# Patient Record
Sex: FEMALE | Race: WHITE | Employment: OTHER | URBAN - METROPOLITAN AREA
[De-identification: names, ages, dates, MRNs, and addresses within clinical notes are randomized per-mention and may not be internally consistent; named-entity substitution may affect disease eponyms.]

---

## 2019-10-07 ENCOUNTER — HOSPITAL ENCOUNTER (EMERGENCY)
Age: 84
Discharge: HOME | End: 2019-10-07
Attending: EMERGENCY MEDICINE
Payer: MEDICARE

## 2019-10-07 ENCOUNTER — APPOINTMENT (OUTPATIENT)
Dept: GENERAL RADIOLOGY | Age: 84
End: 2019-10-07
Payer: MEDICARE

## 2019-10-07 VITALS
OXYGEN SATURATION: 96 % | DIASTOLIC BLOOD PRESSURE: 80 MMHG | SYSTOLIC BLOOD PRESSURE: 150 MMHG | HEIGHT: 63 IN | TEMPERATURE: 97.2 F | HEART RATE: 89 BPM | RESPIRATION RATE: 14 BRPM | WEIGHT: 149.25 LBS | BODY MASS INDEX: 26.45 KG/M2

## 2019-10-07 DIAGNOSIS — S93.492A SPRAIN OF ANTERIOR TALOFIBULAR LIGAMENT OF LEFT ANKLE, INITIAL ENCOUNTER: Primary | ICD-10-CM

## 2019-10-07 PROCEDURE — 73610 X-RAY EXAM OF ANKLE: CPT

## 2019-10-07 PROCEDURE — 99283 EMERGENCY DEPT VISIT LOW MDM: CPT

## 2019-10-07 ASSESSMENT — PAIN DESCRIPTION - PAIN TYPE: TYPE: ACUTE PAIN

## 2019-10-07 ASSESSMENT — PAIN DESCRIPTION - FREQUENCY: FREQUENCY: INTERMITTENT

## 2019-10-07 ASSESSMENT — PAIN DESCRIPTION - LOCATION: LOCATION: ANKLE

## 2019-10-07 ASSESSMENT — PAIN SCALES - GENERAL
PAINLEVEL_OUTOF10: 1
PAINLEVEL_OUTOF10: 1
PAINLEVEL_OUTOF10: 2

## 2019-10-07 ASSESSMENT — PAIN DESCRIPTION - ORIENTATION: ORIENTATION: LEFT

## 2019-10-07 ASSESSMENT — PAIN - FUNCTIONAL ASSESSMENT: PAIN_FUNCTIONAL_ASSESSMENT: PREVENTS OR INTERFERES SOME ACTIVE ACTIVITIES AND ADLS

## 2020-04-17 ENCOUNTER — APPOINTMENT (OUTPATIENT)
Dept: CT IMAGING | Age: 85
DRG: 066 | End: 2020-04-17
Payer: MEDICARE

## 2020-04-17 ENCOUNTER — APPOINTMENT (OUTPATIENT)
Dept: GENERAL RADIOLOGY | Age: 85
DRG: 066 | End: 2020-04-17
Payer: MEDICARE

## 2020-04-17 ENCOUNTER — HOSPITAL ENCOUNTER (INPATIENT)
Age: 85
LOS: 3 days | Discharge: ANOTHER HOSPITAL | DRG: 066 | End: 2020-04-20
Attending: EMERGENCY MEDICINE | Admitting: INTERNAL MEDICINE
Payer: MEDICARE

## 2020-04-17 PROBLEM — I63.9 ACUTE THROMBOTIC STROKE (HCC): Status: ACTIVE | Noted: 2020-04-17

## 2020-04-17 LAB
A/G RATIO: 1.5 (ref 1.1–2.2)
ALBUMIN SERPL-MCNC: 5 G/DL (ref 3.4–5)
ALP BLD-CCNC: 112 U/L (ref 40–129)
ALT SERPL-CCNC: 12 U/L (ref 10–40)
ANION GAP SERPL CALCULATED.3IONS-SCNC: 14 MMOL/L (ref 3–16)
APTT: 29.2 SEC (ref 24.2–36.2)
AST SERPL-CCNC: 21 U/L (ref 15–37)
BASOPHILS ABSOLUTE: 0.1 K/UL (ref 0–0.2)
BASOPHILS RELATIVE PERCENT: 0.8 %
BILIRUB SERPL-MCNC: 1.2 MG/DL (ref 0–1)
BUN BLDV-MCNC: 15 MG/DL (ref 7–20)
CALCIUM SERPL-MCNC: 10.5 MG/DL (ref 8.3–10.6)
CHLORIDE BLD-SCNC: 102 MMOL/L (ref 99–110)
CO2: 27 MMOL/L (ref 21–32)
CREAT SERPL-MCNC: 0.6 MG/DL (ref 0.6–1.2)
EOSINOPHILS ABSOLUTE: 0.1 K/UL (ref 0–0.6)
EOSINOPHILS RELATIVE PERCENT: 1.1 %
GFR AFRICAN AMERICAN: >60
GFR NON-AFRICAN AMERICAN: >60
GLOBULIN: 3.3 G/DL
GLUCOSE BLD-MCNC: 124 MG/DL (ref 70–99)
GLUCOSE BLD-MCNC: 127 MG/DL (ref 70–99)
HCT VFR BLD CALC: 46.7 % (ref 36–48)
HEMOGLOBIN: 15.6 G/DL (ref 12–16)
INR BLD: 0.95 (ref 0.86–1.14)
LYMPHOCYTES ABSOLUTE: 1.6 K/UL (ref 1–5.1)
LYMPHOCYTES RELATIVE PERCENT: 24.4 %
MCH RBC QN AUTO: 29 PG (ref 26–34)
MCHC RBC AUTO-ENTMCNC: 33.4 G/DL (ref 31–36)
MCV RBC AUTO: 86.8 FL (ref 80–100)
MONOCYTES ABSOLUTE: 0.3 K/UL (ref 0–1.3)
MONOCYTES RELATIVE PERCENT: 4.7 %
NEUTROPHILS ABSOLUTE: 4.6 K/UL (ref 1.7–7.7)
NEUTROPHILS RELATIVE PERCENT: 69 %
PDW BLD-RTO: 13.9 % (ref 12.4–15.4)
PERFORMED ON: ABNORMAL
PLATELET # BLD: 296 K/UL (ref 135–450)
PMV BLD AUTO: 8.6 FL (ref 5–10.5)
POTASSIUM SERPL-SCNC: 4.2 MMOL/L (ref 3.5–5.1)
PRO-BNP: 359 PG/ML (ref 0–449)
PROTHROMBIN TIME: 11 SEC (ref 10–13.2)
RBC # BLD: 5.38 M/UL (ref 4–5.2)
SODIUM BLD-SCNC: 143 MMOL/L (ref 136–145)
TOTAL PROTEIN: 8.3 G/DL (ref 6.4–8.2)
TROPONIN: <0.01 NG/ML
WBC # BLD: 6.6 K/UL (ref 4–11)

## 2020-04-17 PROCEDURE — 80053 COMPREHEN METABOLIC PANEL: CPT

## 2020-04-17 PROCEDURE — 85730 THROMBOPLASTIN TIME PARTIAL: CPT

## 2020-04-17 PROCEDURE — 85025 COMPLETE CBC W/AUTO DIFF WBC: CPT

## 2020-04-17 PROCEDURE — 70450 CT HEAD/BRAIN W/O DYE: CPT

## 2020-04-17 PROCEDURE — 6360000002 HC RX W HCPCS: Performed by: INTERNAL MEDICINE

## 2020-04-17 PROCEDURE — 6370000000 HC RX 637 (ALT 250 FOR IP): Performed by: PHYSICIAN ASSISTANT

## 2020-04-17 PROCEDURE — 99291 CRITICAL CARE FIRST HOUR: CPT

## 2020-04-17 PROCEDURE — 84484 ASSAY OF TROPONIN QUANT: CPT

## 2020-04-17 PROCEDURE — 6360000004 HC RX CONTRAST MEDICATION: Performed by: PHYSICIAN ASSISTANT

## 2020-04-17 PROCEDURE — 6370000000 HC RX 637 (ALT 250 FOR IP): Performed by: INTERNAL MEDICINE

## 2020-04-17 PROCEDURE — 93005 ELECTROCARDIOGRAM TRACING: CPT | Performed by: PHYSICIAN ASSISTANT

## 2020-04-17 PROCEDURE — 71045 X-RAY EXAM CHEST 1 VIEW: CPT

## 2020-04-17 PROCEDURE — 83880 ASSAY OF NATRIURETIC PEPTIDE: CPT

## 2020-04-17 PROCEDURE — 85610 PROTHROMBIN TIME: CPT

## 2020-04-17 PROCEDURE — 70496 CT ANGIOGRAPHY HEAD: CPT

## 2020-04-17 PROCEDURE — 2060000000 HC ICU INTERMEDIATE R&B

## 2020-04-17 RX ORDER — FAMOTIDINE 20 MG/1
20 TABLET, FILM COATED ORAL 2 TIMES DAILY
Status: DISCONTINUED | OUTPATIENT
Start: 2020-04-17 | End: 2020-04-20 | Stop reason: HOSPADM

## 2020-04-17 RX ORDER — AMLODIPINE BESYLATE 10 MG/1
10 TABLET ORAL DAILY
Status: ON HOLD | COMMUNITY
End: 2020-05-19 | Stop reason: HOSPADM

## 2020-04-17 RX ORDER — ACETAMINOPHEN 500 MG
500 TABLET ORAL EVERY 4 HOURS PRN
Status: ON HOLD | COMMUNITY
End: 2020-05-19 | Stop reason: HOSPADM

## 2020-04-17 RX ORDER — LOSARTAN POTASSIUM 100 MG/1
100 TABLET ORAL DAILY
Status: DISCONTINUED | OUTPATIENT
Start: 2020-04-18 | End: 2020-04-20 | Stop reason: HOSPADM

## 2020-04-17 RX ORDER — ASPIRIN 81 MG/1
81 TABLET, CHEWABLE ORAL DAILY
Status: DISCONTINUED | OUTPATIENT
Start: 2020-04-18 | End: 2020-04-17

## 2020-04-17 RX ORDER — MELOXICAM 7.5 MG/1
7.5 TABLET ORAL DAILY
Status: DISCONTINUED | OUTPATIENT
Start: 2020-04-17 | End: 2020-04-17

## 2020-04-17 RX ORDER — CALCIUM CARBONATE 500(1250)
500 TABLET ORAL DAILY
Status: DISCONTINUED | OUTPATIENT
Start: 2020-04-18 | End: 2020-04-20 | Stop reason: HOSPADM

## 2020-04-17 RX ORDER — BIOTIN 1 MG
1000 TABLET ORAL
COMMUNITY

## 2020-04-17 RX ORDER — ASPIRIN 81 MG/1
324 TABLET, CHEWABLE ORAL ONCE
Status: COMPLETED | OUTPATIENT
Start: 2020-04-17 | End: 2020-04-17

## 2020-04-17 RX ORDER — ASPIRIN 81 MG/1
324 TABLET, CHEWABLE ORAL DAILY
Status: DISCONTINUED | OUTPATIENT
Start: 2020-04-18 | End: 2020-04-18

## 2020-04-17 RX ORDER — AMLODIPINE BESYLATE 5 MG/1
10 TABLET ORAL DAILY
Status: DISCONTINUED | OUTPATIENT
Start: 2020-04-18 | End: 2020-04-20 | Stop reason: HOSPADM

## 2020-04-17 RX ORDER — ASPIRIN 81 MG/1
81 TABLET, CHEWABLE ORAL DAILY
Status: ON HOLD | COMMUNITY
End: 2020-05-19 | Stop reason: HOSPADM

## 2020-04-17 RX ORDER — ATORVASTATIN CALCIUM 40 MG/1
40 TABLET, FILM COATED ORAL NIGHTLY
Status: DISCONTINUED | OUTPATIENT
Start: 2020-04-17 | End: 2020-04-20 | Stop reason: HOSPADM

## 2020-04-17 RX ADMIN — ENOXAPARIN SODIUM 40 MG: 40 INJECTION SUBCUTANEOUS at 18:02

## 2020-04-17 RX ADMIN — ASPIRIN 81 MG 243 MG: 81 TABLET ORAL at 12:10

## 2020-04-17 RX ADMIN — DESMOPRESSIN ACETATE 40 MG: 0.2 TABLET ORAL at 21:22

## 2020-04-17 RX ADMIN — IOPAMIDOL 75 ML: 755 INJECTION, SOLUTION INTRAVENOUS at 11:31

## 2020-04-17 RX ADMIN — FAMOTIDINE 20 MG: 20 TABLET, FILM COATED ORAL at 21:22

## 2020-04-17 ASSESSMENT — ENCOUNTER SYMPTOMS
DIARRHEA: 0
COUGH: 0
NAUSEA: 0
SHORTNESS OF BREATH: 0
VOMITING: 0
ABDOMINAL PAIN: 0
RHINORRHEA: 0

## 2020-04-17 ASSESSMENT — PAIN SCALES - GENERAL
PAINLEVEL_OUTOF10: 0
PAINLEVEL_OUTOF10: 0

## 2020-04-17 NOTE — ED NOTES
About 1 AM this morning, pt states that right leg weakness and unable to get right leg onto bed after using the restroom. All other 3 extremities were normal. Pt states she got up about 6 AM and said that right leg still felt weak and called MD and was told to come to the ER.       Gillian Hawkins RN  04/17/20 6277

## 2020-04-17 NOTE — PROGRESS NOTES
1600 Client admitted to ICU from ED, Telemetry applied & shows normal sinus rhythm, NIHSS 4. Oriented to Room, Explained hospital & floor routines & equipment, Call light provided, Phone in reach, Family called with update, All verbalize understanding. Will continue to monitor.    Saundra Barahona RN, BSN

## 2020-04-17 NOTE — ED PROVIDER NOTES
wheezing or rales. Abdominal:      General: Bowel sounds are normal. There is no distension. Palpations: Abdomen is soft. Tenderness: There is no abdominal tenderness. There is no guarding. Musculoskeletal: Normal range of motion. Skin:     General: Skin is warm and dry. Neurological:      Mental Status: She is alert and oriented to person, place, and time. GCS: GCS eye subscore is 4. GCS verbal subscore is 5. GCS motor subscore is 6. Cranial Nerves: Cranial nerves are intact. Sensory: Sensation is intact. Motor: Motor function is intact. Coordination: Coordination is intact.       Gait: Gait abnormal.   Psychiatric:         Behavior: Behavior normal.         DIAGNOSTIC RESULTS   LABS:    Labs Reviewed   CBC WITH AUTO DIFFERENTIAL - Abnormal; Notable for the following components:       Result Value    RBC 5.38 (*)     All other components within normal limits    Narrative:     Performed at:  [facility] [address],  [place], [place]   Phone [phone]   COMPREHENSIVE METABOLIC PANEL - Abnormal; Notable for the following components:    Glucose 127 (*)     Total Protein 8.3 (*)     Total Bilirubin 1.2 (*)     All other components within normal limits    Narrative:     Performed at:  [facility] [address], [place], [place]   Phone [phone]   POCT GLUCOSE - Abnormal; Notable for the following components:    POC Glucose 124 (*)     All other components within normal limits    Narrative:     Performed at:  [facility] [address],  [place], [place]   Phone [phone]   PROTIME-INR    Narrative:     Performed at:  [facility] [address]  [place], [place]   Phone [phone]   APTT    Narrative:     Performed at:  [facility] [address]   Phone 3. There is a 50% stenosis at the left vertebral artery origin. No   flow-limiting stenosis in the right vertebral artery. 4. No evidence of a flow-limiting stenosis in the internal carotid arteries. 5. Severe stenoses along the proximal A2 segments of the anterior cerebral   arteries bilaterally. 6. Moderate stenoses noted along bilateral M2 branches of the middle cerebral   arteries. There is also a severe stenosis in the superior left M2 branch of   the middle cerebral artery. 7. Severe stenosis in the left posterior cerebral artery near the P1/P2   junction. 8. No evidence of an aneurysm or major branch vessel occlusion. Results discussed with Dr. Kimmie Guevara at 11:35 a.m. on 04/17/2020. Ct Head Wo Contrast    Result Date: 4/17/2020  EXAMINATION: CT OF THE HEAD WITHOUT CONTRAST; CTA OF THE HEAD AND NECK WITH CONTRAST 4/17/2020 11:20 am TECHNIQUE: CT of the head was performed without the administration of intravenous contrast. Dose modulation, iterative reconstruction, and/or weight based adjustment of the mA/kV was utilized to reduce the radiation dose to as low as reasonably achievable.; CTA of the head and neck was performed with the administration of intravenous contrast. Multiplanar reformatted images are provided for review. MIP images are provided for review. Stenosis of the internal carotid arteries measured using NASCET criteria. Dose modulation, iterative reconstruction, and/or weight based adjustment of the mA/kV was utilized to reduce the radiation dose to as low as reasonably achievable. Noncontrast CT of the head with reconstructed 2-D images are also provided for review. COMPARISON: None HISTORY: ORDERING SYSTEM PROVIDED HISTORY: R leg weakness TECHNOLOGIST PROVIDED HISTORY: Reason for exam:->R leg weakness Has a \"code stroke\" or \"stroke alert\" been called? ->Yes Reason for Exam: R leg weakness Acuity: Unknown Type of Exam: Unknown Dizziness. Posterior circulation issues.

## 2020-04-17 NOTE — ED NOTES
Updated Pts son, Sherryle Aase about POC. All questions answered. Pts son stated for the next RN to keep family updated on POC.  General Marnie number is [phone]     Migue Kimbrough RN  04/17/20 1500

## 2020-04-17 NOTE — ED NOTES
IV started and labs obtained per order. Pt tolerated well. Pt connected to monitors and BP set to cycle. Updated pt on POC; verbalized understanding. Siderails up x 2, call light within reach. Pt denies any further needs at this time. Will continue to monitor.         Marcelino Stein RN  04/17/20 8161

## 2020-04-17 NOTE — ED PROVIDER NOTES
I independently performed a history and physical on Hanna Ceballos. All diagnostic, treatment, and disposition decisions were made by myself in conjunction with the advanced practice provider. Briefly, this is a 80 y.o. female here for right leg weakness and dizziness. The patient has been leaning and falling to the right. The patient was normal last night around 9:30 PM.  She woke with this this morning. She has not had a fall or injury. The patient denies headache. She has not had loss of vision. .    On exam, patient has a 4-5 weakness in the right lower extremity. She has 5 out of 5 strength in the left lower extremity. She has normal strength equally in the bilateral upper extremities. She exhibits normal finger-to-nose bilaterally. She is awake, alert, and oriented. Speech is clear. She has no aphasia or dysarthria. Face is symmetric. EKG  The Ekg interpreted by me in the absence of a cardiologist shows. normal sinus rhythm with a rate of 85  Axis is   Left axis deviation  QTc is  normal  Intervals and Durations are unremarkable. LVH. No specific ST-T wave changes appreciated. No evidence of acute ischemia. No significant change from prior EKG dated 6/15/2018    Screenings  NIH Stroke Scale  Interval: Baseline  Level of Consciousness (1a. ): Alert  LOC Questions (1b. ):  Answers both correctly  LOC Commands (1c. ): Performs both tasks correctly  Best Gaze (2. ): Normal  Visual (3. ): No visual loss  Facial Palsy (4. ): Normal symmetrical movement  Motor Arm, Left (5a. ): No drift  Motor Arm, Right (5b. ): No drift  Motor Leg, Left (6a. ): No drift  Motor Leg, Right (6b. ): Drift, but does not hit bed  Limb Ataxia (7. ): Absent  Sensory (8. ): Normal  Best Language (9. ): No aphasia  Dysarthria (10. ): Normal  Extinction and Inattention (11): No abnormality  Total: 1      Patient Referrals:  Joseph Zhou MD  [address] [address]  [phone]            Discharge Medications:  New Prescriptions    No medications on file       FINAL IMPRESSION  1. Acute focal neurological deficit, onset within 3-24 hours    2. Right leg weakness    3. Dizziness        Blood pressure (!) 147/68, pulse 84, temperature 98 °F (36.7 °C), temperature source Oral, resp. rate 15, height 5' 3\" (1.6 m), weight 145 lb (65.8 kg), SpO2 98 %.      For further details of [address] [place] [address] emergency department encounter, please see documentation by advanced practice provider, VIOLET Salamanca.        Sisi Li MD  04/17/20 6609

## 2020-04-17 NOTE — ED NOTES
Ioana, RN and this RN did NIH handoff at bedside along with bedside report. All questions answered. Pt transported to floor via wheelchair with all belongings.       Francesco Vela RN  04/17/20 2950

## 2020-04-17 NOTE — PLAN OF CARE
Re-Assessment complete, see flow sheet. BP (!) 150/62   Pulse 71   Temp 97.2 °F (36.2 °C) (Temporal)   Resp 18   Ht 5' 3\" (1.6 m)   Wt 142 lb 3.2 oz (64.5 kg)   SpO2 96%   BMI 25.19 kg/m²  room air, normal sinus rhythm. Denies pain, no complaints voiced. Nihss 2 with right leg pronator drift & right leg ataxia. No facial droop or aphasia @ this time. Client remains free from falls, bed in lowest position, wheels locked, side rails up times 2, bed alarm in place, door open, encouraged to use call light for needs, phone and call light are within reach. Will continue to monitor.   Laura Arias RN, BSN

## 2020-04-18 ENCOUNTER — APPOINTMENT (OUTPATIENT)
Dept: MRI IMAGING | Age: 85
DRG: 066 | End: 2020-04-18
Payer: MEDICARE

## 2020-04-18 PROBLEM — I10 ESSENTIAL HYPERTENSION: Status: ACTIVE | Noted: 2020-04-18

## 2020-04-18 PROBLEM — I67.9 CEREBROVASCULAR DISEASE: Status: ACTIVE | Noted: 2020-04-18

## 2020-04-18 PROBLEM — R27.0 ATAXIA: Status: ACTIVE | Noted: 2020-04-18

## 2020-04-18 PROBLEM — I25.10 ASHD (ARTERIOSCLEROTIC HEART DISEASE): Status: ACTIVE | Noted: 2020-04-18

## 2020-04-18 PROBLEM — G83.10 MONOPARESIS OF LEG (HCC): Status: ACTIVE | Noted: 2020-04-18

## 2020-04-18 PROBLEM — R47.01 EXPRESSIVE APHASIA: Status: ACTIVE | Noted: 2020-04-18

## 2020-04-18 LAB
EKG ATRIAL RATE: 85 BPM
EKG DIAGNOSIS: NORMAL
EKG P AXIS: 51 DEGREES
EKG P-R INTERVAL: 160 MS
EKG Q-T INTERVAL: 382 MS
EKG QRS DURATION: 90 MS
EKG QTC CALCULATION (BAZETT): 454 MS
EKG R AXIS: -37 DEGREES
EKG T AXIS: 37 DEGREES
EKG VENTRICULAR RATE: 85 BPM
LV EF: 55 %
LVEF MODALITY: NORMAL

## 2020-04-18 PROCEDURE — 99223 1ST HOSP IP/OBS HIGH 75: CPT | Performed by: PSYCHIATRY & NEUROLOGY

## 2020-04-18 PROCEDURE — 97116 GAIT TRAINING THERAPY: CPT

## 2020-04-18 PROCEDURE — 2060000000 HC ICU INTERMEDIATE R&B

## 2020-04-18 PROCEDURE — 70551 MRI BRAIN STEM W/O DYE: CPT

## 2020-04-18 PROCEDURE — 93010 ELECTROCARDIOGRAM REPORT: CPT | Performed by: INTERNAL MEDICINE

## 2020-04-18 PROCEDURE — 6370000000 HC RX 637 (ALT 250 FOR IP): Performed by: INTERNAL MEDICINE

## 2020-04-18 PROCEDURE — 97530 THERAPEUTIC ACTIVITIES: CPT

## 2020-04-18 PROCEDURE — 97162 PT EVAL MOD COMPLEX 30 MIN: CPT

## 2020-04-18 PROCEDURE — 93306 TTE W/DOPPLER COMPLETE: CPT

## 2020-04-18 PROCEDURE — 97166 OT EVAL MOD COMPLEX 45 MIN: CPT

## 2020-04-18 PROCEDURE — 6360000002 HC RX W HCPCS: Performed by: INTERNAL MEDICINE

## 2020-04-18 PROCEDURE — 92610 EVALUATE SWALLOWING FUNCTION: CPT

## 2020-04-18 PROCEDURE — 97535 SELF CARE MNGMENT TRAINING: CPT

## 2020-04-18 RX ORDER — LOSARTAN POTASSIUM 25 MG/1
TABLET ORAL
Status: DISCONTINUED
Start: 2020-04-18 | End: 2020-04-18 | Stop reason: WASHOUT

## 2020-04-18 RX ORDER — LOSARTAN POTASSIUM 100 MG/1
TABLET ORAL
Status: DISPENSED
Start: 2020-04-18 | End: 2020-04-19

## 2020-04-18 RX ORDER — CLOPIDOGREL BISULFATE 75 MG/1
75 TABLET ORAL DAILY
Status: DISCONTINUED | OUTPATIENT
Start: 2020-04-19 | End: 2020-04-20 | Stop reason: HOSPADM

## 2020-04-18 RX ORDER — ASPIRIN 81 MG/1
81 TABLET ORAL DAILY
Status: DISCONTINUED | OUTPATIENT
Start: 2020-04-19 | End: 2020-04-20 | Stop reason: HOSPADM

## 2020-04-18 RX ADMIN — DESMOPRESSIN ACETATE 40 MG: 0.2 TABLET ORAL at 20:08

## 2020-04-18 RX ADMIN — ASPIRIN 81 MG 324 MG: 81 TABLET ORAL at 10:15

## 2020-04-18 RX ADMIN — FAMOTIDINE 20 MG: 20 TABLET, FILM COATED ORAL at 10:16

## 2020-04-18 RX ADMIN — AMLODIPINE BESYLATE 10 MG: 5 TABLET ORAL at 12:25

## 2020-04-18 RX ADMIN — LOSARTAN POTASSIUM 100 MG: 100 TABLET ORAL at 12:30

## 2020-04-18 RX ADMIN — FAMOTIDINE 20 MG: 20 TABLET, FILM COATED ORAL at 20:07

## 2020-04-18 RX ADMIN — CALCIUM 500 MG: 500 TABLET ORAL at 10:15

## 2020-04-18 RX ADMIN — ENOXAPARIN SODIUM 40 MG: 40 INJECTION SUBCUTANEOUS at 10:15

## 2020-04-18 ASSESSMENT — PAIN SCALES - GENERAL
PAINLEVEL_OUTOF10: 0

## 2020-04-18 NOTE — H&P
[facility]                     [facility], [address]                              HISTORY AND PHYSICAL    PATIENT NAME: Jaren Parks                  :        1934  MED REC NO:   [id number]                          ROOM:       5095  ACCOUNT NO:   [de-identified]                           ADMIT DATE: 2020  PROVIDER:     Sheila Foster MD    HISTORY OF PRESENT ILLNESS:  The patient is an 22-year-old white woman,  patient of Dr. Crystal Ross, came to the emergency room with history of  right leg weakness, dizziness, lack of coordination, some expressive  aphasia. The symptoms are almost 12 to 18 hours in duration. The  patient did not have any headaches. There was no loss of vision. No  seizure. By the time I saw the patient, except the right leg weakness  most of the symptoms had recovered. The patient still had slight  expressive aphasia and inability to find words. There was some facial  asymmetry initially, but it recovered. PAST MEDICAL HISTORY:  Pertinent for hypertension, hyperlipidemia,  phlebitis, history of DVTs, gastroesophageal reflux disease,  atherosclerotic heart disease, osteoarthritis. PAST SURGICAL HISTORY:  Pertinent for appendectomy, breast surgery,  cholecystectomy, EGD and colonoscopy, cataract removal bilaterally,  right-sided shoulder replacement, varicose vein surgery. FAMILY HISTORY:  Both her parents are  because of natural  causes. SOCIAL HISTORY:  The patient is a  woman, she lives with her  . She has two children, which are grown, few grandchildren. She  used to work for [employer] in [place]. She has  worked in various departments in that organization. There is no history  of smoking. No history of drinking. No history of substance abuse. REVIEW OF SYSTEMS:  Negative for loss of consciousness. No visual  blurring.   Had slight expressive aphasia

## 2020-04-18 NOTE — PROGRESS NOTES
Occupational Therapy   Occupational Therapy Initial Assessment  Date: 2020   Patient Name: Francesco Hussein  MRN: [id number]     : 1934    Date of Service: 2020    Discharge Recommendations: Hanna Ceballos scored a 15/24 on the AM-PAC ADL Inpatient form. Current research shows that an AM-PAC score of 17 or less is typically not associated with a discharge to the patient's home setting. Based on the patients AM-PAC score and their current ADL deficits, it is recommended that the patient have 5-7 sessions per week of Occupational Therapy at d/c to increase the patients independence. If patient discharges prior to next session this note will serve as a discharge summary. Please see below for the latest assessment towards goals. OT Equipment Recommendations  Equipment Needed: No    Assessment   Performance deficits / Impairments: Decreased functional mobility ; Decreased ADL status; Decreased cognition;Decreased safe awareness;Decreased endurance;Decreased vision/visual deficit; Decreased balance;Decreased high-level IADLs  Assessment: Patient presents with the above deficits impacting occupational performance and functioning below baseline level. Recommend skilled OT to address deficits and promote functional independence. Treatment Diagnosis: Decreased functional mobility, ADL status, decreased safety, decreased cognition, decreased balance and endurance associated with CVA  Prognosis: Good  Decision Making: Medium Complexity  OT Education: OT Role;Plan of Care;IADL Safety  Patient Education: Patient verbalized independence with education including stopping driving until driving eval performed. Needs continued reinforcement for independence.   Barriers to Learning: cognition  REQUIRES OT FOLLOW UP: Yes  Activity Tolerance  Activity Tolerance: Patient Tolerated treatment well;Treatment limited secondary to decreased cognition  Activity Tolerance: Diminished safety  Safety Devices  Safety Devices in place: Yes  Type of devices: All fall risk precautions in place;Call light within reach; Chair alarm in place; Left in chair;Patient at risk for falls;Gait belt;Nurse notified           Patient Diagnosis(es): The primary encounter diagnosis was Acute focal neurological deficit, onset within 3-24 hours. Diagnoses of Right leg weakness and Dizziness were also pertinent to this visit. has a past medical history of Arthritis, CAD (coronary artery disease), GERD (gastroesophageal reflux disease), Hx of blood clots, Hyperlipidemia, and Hypertension. has a past surgical history that includes Cholecystectomy; Appendectomy; Varicose vein surgery; Endoscopy, colon, diagnostic; eye surgery; joint replacement; and Breast surgery. Treatment Diagnosis: Decreased functional mobility, ADL status, decreased safety, decreased cognition, decreased balance and endurance associated with CVA      Restrictions  Restrictions/Precautions  Restrictions/Precautions: Fall Risk(high fall risk)  Required Braces or Orthoses?: No  Position Activity Restriction  Other position/activity restrictions: Selvin Westfall is a 80 y.o. female who presents to the emergency department today for evaluation for right leg weakness. The patient has a history of hypertension and hyperlipidemia.      Subjective   General  Chart Reviewed: Yes  Patient assessed for rehabilitation services?: Yes  Additional Pertinent Hx: HTN, HLD, CAD, arthritis  Family / Caregiver Present: No  Diagnosis: CVA  Subjective  Subjective: Patient in bed, agreeable to therapy  Patient Currently in Pain: Denies  Social/Functional History  Social/Functional History  Lives With: Spouse  Type of Home: House  Home Layout: Multi-level(Patient stays on main level)  Home Access: Ramped entrance  Bathroom Shower/Tub: Tub/Shower unit, Curtain, Shower chair without back  Bathroom Toilet: Standard  Bathroom Equipment: Grab bars in shower  Bathroom Accessibility: [address] No  Oculo Motor Control: WNL  Cognition  Overall Cognitive Status: Exceptions  Arousal/Alertness: Appropriate responses to stimuli  Following Commands: Follows one step commands with increased time; Follows one step commands with repetition  Attention Span: Difficulty attending to directions  Memory: Decreased recall of recent events  Safety Judgement: Decreased awareness of need for safety;Decreased awareness of need for assistance  Problem Solving: Decreased awareness of errors  Insights: Decreased awareness of deficits  Initiation: Does not require cues  Sequencing: Requires cues for some  Cognition Comment: Patient impulsive with mobility        Sensation  Overall Sensation Status: WNL        LUE AROM (degrees)  LUE AROM : WFL  RUE AROM (degrees)  RUE General AROM: Limited shoulder ROM due to prior shoulder replacement, distal WFL  LUE Strength  L Hand General: 5/5  RUE Strength  R Hand General: 5/5                   Plan   Plan  Times per week: 5-7x  Times per day: Daily  Current Treatment Recommendations: Functional Mobility Training, Endurance Training, Safety Education & Training, Self-Care / ADL, Home Management Training      AM-PAC Score        AM-[facility] Inpatient Daily Activity Raw Score: 15 (04/18/20 1258)  AM-PAC Inpatient ADL T-Scale Score : 34.69 (04/18/20 1258)  ADL Inpatient CMS 0-100% Score: 56.46 (04/18/20 1258)  ADL Inpatient CMS G-Code Modifier : CK (04/18/20 1258)    Goals  Short term goals  Time Frame for Short term goals: Discharge  Short term goal 1: Min A with LB dressing  Short term goal 2: SBA with transfers  Short term goal 3: SBA with functional ADL mobility  Short term goal 4: Bathing with set up  Short term goal 5: Patient to perform simple I ADL task with good safety awareness. Long term goals  Time Frame for Long term goals : LTG=STG  Patient Goals   Patient goals : Libby Chen I go home today? \"       Therapy Time   Individual Concurrent Group Co-treatment   Time In 2104         Time Out 4841

## 2020-04-18 NOTE — PROGRESS NOTES
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 Objective  Observation/Palpation  Posture: Fair    PROM RLE (degrees)  RLE PROM: WNL  AROM RLE (degrees)  RLE AROM: WNL  PROM LLE (degrees)  LLE PROM: WNL  AROM LLE (degrees)  LLE AROM : WNL  Strength RLE  Comment: Grossly 3/5  Strength LLE  Strength LLE: WFL     Sensation  Overall Sensation Status: WNL  Bed mobility  Supine to Sit: Minimal assistance  Transfers  Sit to Stand: Contact guard assistance  Stand to sit: Contact guard assistance  Bed to Chair: Contact guard assistance  Stand Pivot Transfers: Contact guard assistance  Comment: RLE weakness noted throughout. Ambulation 1  Surface: level tile  Device: Rolling Walker  Assistance: Minimal assistance  Quality of Gait: LOB x1 w/ scissoring and right lateral fall towards therapist, RLE ataxia  Gait Deviations: Slow Janice;Decreased step length;Decreased step height  Distance: 48'  Comments: Patient unable to take any steps without support of walker. Verbal cues given for patient to stay closer to walker. RLE outside of walker base during right turn, verbal cues provided. Balance  Posture: Fair  Sitting - Static: Good  Sitting - Dynamic: Fair;+  Standing - Static: Fair;-  Standing - Dynamic: Poor  Exercises  Comments: Patient provided HEP handout for supine and seated ther ex w/ verbal cues to focus on RLE.      Plan   Plan  Times per week: 5-7  Times per day: Daily  Current Treatment Recommendations: Strengthening, Functional Mobility Training, Transfer Training, Equipment Evaluation, Education, & procurement, Home Exercise Program, Gait Training, Safety Education & Training, Balance Training, Endurance Training, Patient/Caregiver Education & Training  Safety Devices  Type of devices: All fall risk precautions in place, Call light within reach, Chair alarm in place, Gait belt, Patient at risk for falls, Left in chair, Nurse notified  Restraints  Initially in place: No    AM-PAC Score  AM-PAC Inpatient Mobility Raw Score : 16 (04/18/20 1226)  AM-PAC Inpatient T-Scale Score : 40.78 (04/18/20 1226)  Mobility Inpatient CMS 0-100% Score: 54.16 (04/18/20 1226)  Mobility Inpatient CMS G-Code Modifier : CK (04/18/20 1226)     Goals  Short term goals  Time Frame for Short term goals: Patient will perform bed mobility MOD I. Short term goal 1: Patient will performed bed-chair transfer MOD I w/ rolling walker. Short term goal 2: Patient able to ambulate MOD I w/ rolling walker 50'. Patient Goals   Patient goals : Return home.      Therapy Time   Individual Concurrent Group Co-treatment   Time In       2317   Time Out       1205   Minutes       39   Timed Code Treatment Minutes: [address], DPT, ATC-R [id number]

## 2020-04-18 NOTE — PROGRESS NOTES
Results Communication Center at 9:09   am on 4/18/2020to be communicated to a licensed caregiver. Chest Xray 4/18/2020: Impression   No acute cardiopulmonary pathology.           Treatment Diagnosis:  Dysphagia    Impressions: Pt presents with a grossly functional swallow for age. Pt seen bedside, positioned upright. Pt is oriented x4 and on RA. Pt denies pain or SOB. Oral mech exam reveals some missing teeth. Strength/ROM are functional. Volitional cough is strong and volitional swallow timely for age with good laryngeal movement. Trials of water, nectar thick liquid, puree, soft mixed consistency solid, and regular solids were provided. Mastication and transit are timely. No oral residuals noted. Pt shows no observed s/s of aspiration across any consistency today even thin liquid via consecutive straw sips. Per RN and pt, no difficulty with med administration today. Recommend continuing current diet. No further speech therapy indicated. Dietary Recommendations: Regular textured diet with thin liquids, meds as tolerated    Strategies: 90 degree positioning with all p.o. intake; small bites/sips; alternate textures through meal; reduce rate of intake    Oral motor Exam:  Dentition: Natural missing  Labial/Facial: Symmetrical  Lingual:WFL  Voice:WFL    Oral Phase:   WFL    Pharyngeal Phase:  WFL    Patient/Family Education:Education, results and recommendations given to the Pt and nurse, who verbalized understanding          Timed Code Treatment:0    Total Treatment Time:25    Discharge Recommendations: No further speech is indicated.     Signature: Vilma Johnston MA CCC-SLP #[id number]  Speech Language Pathologist

## 2020-04-19 PROCEDURE — 6370000000 HC RX 637 (ALT 250 FOR IP): Performed by: INTERNAL MEDICINE

## 2020-04-19 PROCEDURE — 2060000000 HC ICU INTERMEDIATE R&B

## 2020-04-19 PROCEDURE — 6360000002 HC RX W HCPCS: Performed by: INTERNAL MEDICINE

## 2020-04-19 PROCEDURE — 99233 SBSQ HOSP IP/OBS HIGH 50: CPT | Performed by: PSYCHIATRY & NEUROLOGY

## 2020-04-19 PROCEDURE — 6370000000 HC RX 637 (ALT 250 FOR IP): Performed by: PSYCHIATRY & NEUROLOGY

## 2020-04-19 PROCEDURE — 97530 THERAPEUTIC ACTIVITIES: CPT

## 2020-04-19 RX ORDER — LOSARTAN POTASSIUM 100 MG/1
TABLET ORAL
Status: DISPENSED
Start: 2020-04-19 | End: 2020-04-19

## 2020-04-19 RX ADMIN — ASPIRIN 81 MG: 81 TABLET, COATED ORAL at 09:58

## 2020-04-19 RX ADMIN — DESMOPRESSIN ACETATE 40 MG: 0.2 TABLET ORAL at 21:42

## 2020-04-19 RX ADMIN — FAMOTIDINE 20 MG: 20 TABLET, FILM COATED ORAL at 09:58

## 2020-04-19 RX ADMIN — CLOPIDOGREL 75 MG: 75 TABLET, FILM COATED ORAL at 09:57

## 2020-04-19 RX ADMIN — FAMOTIDINE 20 MG: 20 TABLET, FILM COATED ORAL at 21:42

## 2020-04-19 RX ADMIN — LOSARTAN POTASSIUM 100 MG: 100 TABLET ORAL at 09:56

## 2020-04-19 RX ADMIN — AMLODIPINE BESYLATE 10 MG: 5 TABLET ORAL at 09:58

## 2020-04-19 RX ADMIN — CALCIUM 500 MG: 500 TABLET ORAL at 09:57

## 2020-04-19 RX ADMIN — ENOXAPARIN SODIUM 40 MG: 40 INJECTION SUBCUTANEOUS at 09:57

## 2020-04-19 ASSESSMENT — PAIN SCALES - GENERAL
PAINLEVEL_OUTOF10: 0

## 2020-04-19 NOTE — PROGRESS NOTES
NEUROLOGY FOLLOWUP    HISTORY OF PRESENT ILLNESS :     Radha Amos is a 80 y.o. female   History was obtained from the patient and dictations in the chart. Patient feels her right side is slightly weaker today compared to yesterday. She needs a lot of assistance to ambulate. Detailed history:  Patient lives at home with her . She states that she woke up in the middle of the night and found that her right leg was weak. The right arm was not involved. Onset was sudden. Symptoms are moderately severe. No clear aggravating or relieving factors. She was brought to the hospital and admitted for further evaluation. 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 CT angiogram showed severe stenosis intracranially in the anterior cerebral arteries bilaterally. Comorbidities include hypertension hyperlipidemia coronary artery disease arthritis and gastroesophageal reflux disease    REVIEW OF SYSTEMS       Constitutional:  []   Chills   [x]  Fatigue   []  Fevers   []  Malaise   []  Weight loss     [] Denies all of the above    Respiratory:   []  Cough    []  Shortness of breath         [x] Denies all of the above     Cardiovascular:   []  Chest pain    []  Exertional chest pressure/discomfort           [] Palpitations    []  Syncope     [x] Denies all of the above    Past Medical History:   Diagnosis Date    Arthritis     CAD (coronary artery disease)     GERD (gastroesophageal reflux disease)     Hx of blood clots     phlebitis    Hyperlipidemia     Hypertension      History reviewed. No pertinent family history.   Social History     Socioeconomic History    Marital status:      Spouse name: None    Number of children: None    Years of education: None    Highest education level: None   Occupational History    None   Social Needs    Financial resource strain: None    Food insecurity     Worry: None     Inability: None    Transportation needs     Medical: None     Non-medical: None   Tobacco Use    Smoking status: Never Smoker    Smokeless tobacco: Never Used   Substance and Sexual Activity    Alcohol use: No    Drug use: No    Sexual activity: Yes     Partners: Male   Lifestyle    Physical activity     Days per week: None     Minutes per session: None    Stress: None   Relationships    Social connections     Talks on phone: None     Gets together: None     Attends Worship service: None     Active member of club or organization: None     Attends meetings of clubs or organizations: None     Relationship status: None    Intimate partner violence     Fear of current or ex partner: None     Emotionally abused: None     Physically abused: None     Forced sexual activity: None   Other Topics Concern    None   Social History Narrative    None        PHYSICAL EXAMINATION      BP (!) 154/69   Pulse 95   Temp 98.9 °F (37.2 °C) (Temporal)   Resp 18   Ht 5' 3\" (1.6 m)   Wt 144 lb 6.4 oz (65.5 kg)   SpO2 96%   BMI 25.58 kg/m²   This is a well-nourished patient in no acute distress  Awake and alert. Oriented x3. Speech normal.  Memory and judgment normal.  No aphasia. Pupils equal round reacting to light. Visual fields full. External ocular movements intact. Face is symmetrical.  Tongue was midline. Strength is 4/5 in the right arm and 3/5 in the right leg. Strength is 4+/5 on the left side. Tone is diminished on the right side and normal on the left side. Deep tendon reflexes 1 on the right and 1+ on the left. Right plantar reflex is extensor and the left is flexor. Coordination impaired on the right side and normal on the left side. Gait is impaired. No carotid bruit. No neck stiffness.   DATA :  LABS:  General Labs:    CBC:   Lab Results   Component Value Date    WBC 6.6 04/17/2020    RBC 5.38 04/17/2020    HGB 15.6 04/17/2020    HCT 46.7

## 2020-04-19 NOTE — PROGRESS NOTES
Department of Internal Medicine  General Internal Medicine   Progress Note      SUBJECTIVE: right sided weakness more pronounced  In right LE , expressive aphasia improved but still ataxic     History obtained from chart review and the patient  General ROS: positive for  - fatigue and malaise  negative for - chills, fever or night sweats  Psychological ROS: negative  Ophthalmic ROS: negative  Respiratory ROS: no cough, shortness of breath, or wheezing  Cardiovascular ROS: no chest pain or dyspnea on exertion  Gastrointestinal ROS: no abdominal pain, change in bowel habits, or black or bloody stools  Genito-Urinary ROS: no dysuria, trouble voiding, or hematuria  Musculoskeletal ROS: negative  Neurological ROS: positive for - gait disturbance, impaired coordination/balance and weakness  negative for - seizures, speech problems or tremors  Dermatological ROS: negative    OBJECTIVE      Medications      Current Facility-Administered Medications: clopidogrel (PLAVIX) tablet 75 mg, 75 mg, Oral, Daily  aspirin EC tablet 81 mg, 81 mg, Oral, Daily  amLODIPine (NORVASC) tablet 10 mg, 10 mg, Oral, Daily  calcium elemental (OSCAL) tablet 500 mg, 500 mg, Oral, Daily  famotidine (PEPCID) tablet 20 mg, 20 mg, Oral, BID  losartan (COZAAR) tablet 100 mg, 100 mg, Oral, Daily  enoxaparin (LOVENOX) injection 40 mg, 40 mg, Subcutaneous, Daily  atorvastatin (LIPITOR) tablet 40 mg, 40 mg, Oral, Nightly    Physical      Vitals: BP (!) 126/58   Pulse 81   Temp 98.9 °F (37.2 °C) (Temporal)   Resp 16   Ht 5' 3\" (1.6 m)   Wt 144 lb 6.4 oz (65.5 kg)   SpO2 95%   BMI 25.58 kg/m²   Temp: Temp: 98.9 °F (37.2 °C)  Max: Temp  Av.5 °F (36.9 °C)  Min: 98 °F (36.7 °C)  Max: 99.2 °F (37.3 °C)  Respiration range:  Resp  Av.7  Min: 16  Max: 18  Pulse Range:  Pulse  Av  Min: 78  Max: 98  Blood pressure range:  Systolic (70PMC), FFE:999 , Min:112 , YFM:796   , Diastolic (84RMD), BUS:72, Min:48, Max:69    SpO2  Av.5 %  Min: 95 % Max: 96 %    Intake/Output Summary (Last 24 hours) at 2020 1407  Last data filed at 2020 0311  Gross per 24 hour   Intake 120 ml   Output 650 ml   Net -530 ml       Vent settings:  Pulse  Av.8  Min: 62  Max: 98  Resp  Av.7  Min: 15  Max: 20  SpO2  Av.8 %  Min: 93 %  Max: 98 %    CONSTITUTIONAL:  fatigued, alert, cooperative, mild distress, appears stated age and normal weight  EYES:  Unremarkable   NECK:  No JVD  and supple, symmetrical, trachea midline  BACK:  symmetric and no curvature  LUNGS:  No increased work of breathing, good air exchange, clear to auscultation bilaterally, no crackles or wheezing  CARDIOVASCULAR:  Normal apical impulse, regular rate and rhythm, normal S1 and S2, no S3 or S4, and no murmur noted  ABDOMEN:  No scars, normal bowel sounds, soft, non-distended, non-tender, no masses palpated, no hepatosplenomegally  MUSCULOSKELETAL:  No distal edema   NEUROLOGIC:  Right sided weakness , positive babinski   SKIN:  Warm and moist  and no bruising or bleeding    Data      Recent Results (from the past 96 hour(s))   POCT Glucose    Collection Time: 20 11:14 AM   Result Value Ref Range    POC Glucose 124 (H) 70 - 99 mg/dl    Performed on ACCU-CHEK    Protime-INR    Collection Time: 20 11:19 AM   Result Value Ref Range    Protime 11.0 10.0 - 13.2 sec    INR 0.95 0.86 - 1.14   APTT    Collection Time: 20 11:19 AM   Result Value Ref Range    aPTT 29.2 24.2 - 36.2 sec   CBC Auto Differential    Collection Time: 20 11:20 AM   Result Value Ref Range    WBC 6.6 4.0 - 11.0 K/uL    RBC 5.38 (H) 4.00 - 5.20 M/uL    Hemoglobin 15.6 12.0 - 16.0 g/dL    Hematocrit 46.7 36.0 - 48.0 %    MCV 86.8 80.0 - 100.0 fL    MCH 29.0 26.0 - 34.0 pg    MCHC 33.4 31.0 - 36.0 g/dL    RDW 13.9 12.4 - 15.4 %    Platelets 936 760 - 518 K/uL    MPV 8.6 5.0 - 10.5 fL    Neutrophils % 69.0 %    Lymphocytes % 24.4 %    Monocytes % 4.7 %    Eosinophils % 1.1 %    Basophils % 0.8 %

## 2020-04-19 NOTE — CARE COORDINATION
Discharge Planning Assessment  Discharge Planning Assessment  RN/SW discharge planner met with patient/ (and family member) to discuss reason for admission, current living situation, and potential needs at the time of discharge    Demographics/Insurance verified Yes- [facility] Medicare    Current type of dwellin story home but pt states she lives in downstairs. Has 4 steps to enter. Patient from ECF/SW confirmed with: n/a    Living arrangements: with spouse     Level of function/Support: independent with adl's, spouse is supportive and they have 2 children that live in the area. PCP: Katherine Parekh    Last Visit to PCP: fahad starks 2020    DME: none     Active with any community resources/agencies/skilled home care: none     Medication compliance issues: none     Financial issues that could impact healthcare: none         Tentative discharge plan: home with possible home care       Discussed and provided facilities of choice if transition to a skilled nursing facility is required at the time of discharge- not at this time       Discussed with patient and/or family that on the day of discharge home tentative time of discharge will be between 10 AM and noon.     Transportation at the time of discharge: spouse     Danielle Patel RN, BSN  [phone]

## 2020-04-19 NOTE — PROGRESS NOTES
Physical Therapy  Facility/Department: [facility] ICU  Daily Treatment Note  NAME: Hernandez Diamond  : 1934  MRN: [id number]    Date of Service: 2020    Discharge Recommendations: Hanna Ceballos scored a 8/24 on the AM-PAC short mobility form. Current research shows that an AM-PAC score of 17 or less is typically not associated with a discharge to the patient's home setting. Based on the patients AM-PAC score and their current functional mobility deficits, it is recommended that the patient have 5-7 sessions per week of Physical Therapy at d/c to increase the patients independence. If patient discharges prior to next session this note will serve as a discharge summary. Please see below for the latest assessment towards goals. PT Equipment Recommendations  Equipment Needed: No  Other: Defer to next level of care. Assessment   Body structures, Functions, Activity limitations: Decreased functional mobility ; Decreased strength;Decreased posture;Decreased safe awareness;Decreased balance  Assessment: Patient with significant change in performance from yesterday to today. Upon evaluation yesterday, patient transferring w/ CGA and able to ambulate 50' w/ rolling walker. Today, patient unable to even maintain standing balance and required MAX to dependent assistance for transfers. RN reports patient was up to chair very early this morning so by the time therapy came to see her she had been sitting up for 5-6 hours. I am hopeful her status change is merely fatigued related and not d/t and advance of her stroke. RN is aware. Will monitor. Treatment Diagnosis: RLE weakness, impaired balance, ambulation deficits  Prognosis: Fair  Decision Making: Medium Complexity  Clinical Presentation: Evolving. PT Education: Transfer Training;Functional Mobility Training  Patient Education: Patient verbalized understanding of all education provided but likely needs reiteration.   Barriers to Learning: None

## 2020-04-20 ENCOUNTER — HOSPITAL ENCOUNTER (INPATIENT)
Age: 85
LOS: 30 days | Discharge: HOME HEALTH | DRG: 057 | End: 2020-05-20
Attending: PHYSICAL MEDICINE & REHABILITATION | Admitting: PHYSICAL MEDICINE & REHABILITATION
Payer: MEDICARE

## 2020-04-20 VITALS
WEIGHT: 143.27 LBS | RESPIRATION RATE: 19 BRPM | OXYGEN SATURATION: 98 % | BODY MASS INDEX: 25.39 KG/M2 | TEMPERATURE: 98.6 F | HEART RATE: 101 BPM | HEIGHT: 63 IN | SYSTOLIC BLOOD PRESSURE: 121 MMHG | DIASTOLIC BLOOD PRESSURE: 54 MMHG

## 2020-04-20 PROBLEM — I63.9 ACUTE ISCHEMIC STROKE (HCC): Status: ACTIVE | Noted: 2020-04-20

## 2020-04-20 PROCEDURE — 6370000000 HC RX 637 (ALT 250 FOR IP): Performed by: PHYSICAL MEDICINE & REHABILITATION

## 2020-04-20 PROCEDURE — 97535 SELF CARE MNGMENT TRAINING: CPT

## 2020-04-20 PROCEDURE — 99232 SBSQ HOSP IP/OBS MODERATE 35: CPT | Performed by: PSYCHIATRY & NEUROLOGY

## 2020-04-20 PROCEDURE — 6360000002 HC RX W HCPCS: Performed by: INTERNAL MEDICINE

## 2020-04-20 PROCEDURE — 97530 THERAPEUTIC ACTIVITIES: CPT

## 2020-04-20 PROCEDURE — 6370000000 HC RX 637 (ALT 250 FOR IP): Performed by: INTERNAL MEDICINE

## 2020-04-20 PROCEDURE — 6370000000 HC RX 637 (ALT 250 FOR IP): Performed by: PSYCHIATRY & NEUROLOGY

## 2020-04-20 PROCEDURE — 1280000000 HC REHAB R&B

## 2020-04-20 RX ORDER — AMLODIPINE BESYLATE 5 MG/1
10 TABLET ORAL DAILY
Status: DISCONTINUED | OUTPATIENT
Start: 2020-04-21 | End: 2020-05-20 | Stop reason: HOSPADM

## 2020-04-20 RX ORDER — ASPIRIN 81 MG/1
81 TABLET ORAL DAILY
Status: CANCELLED | OUTPATIENT
Start: 2020-04-21

## 2020-04-20 RX ORDER — FAMOTIDINE 20 MG/1
20 TABLET, FILM COATED ORAL 2 TIMES DAILY
Status: DISCONTINUED | OUTPATIENT
Start: 2020-04-20 | End: 2020-05-20 | Stop reason: HOSPADM

## 2020-04-20 RX ORDER — TRAZODONE HYDROCHLORIDE 50 MG/1
50 TABLET ORAL NIGHTLY PRN
Status: DISCONTINUED | OUTPATIENT
Start: 2020-04-20 | End: 2020-05-20 | Stop reason: HOSPADM

## 2020-04-20 RX ORDER — CALCIUM CARBONATE 500(1250)
500 TABLET ORAL DAILY
Status: DISCONTINUED | OUTPATIENT
Start: 2020-04-21 | End: 2020-05-20 | Stop reason: HOSPADM

## 2020-04-20 RX ORDER — HYDRALAZINE HYDROCHLORIDE 25 MG/1
50 TABLET, FILM COATED ORAL EVERY 8 HOURS PRN
Status: DISCONTINUED | OUTPATIENT
Start: 2020-04-20 | End: 2020-05-20 | Stop reason: HOSPADM

## 2020-04-20 RX ORDER — ATORVASTATIN CALCIUM 40 MG/1
40 TABLET, FILM COATED ORAL NIGHTLY
Status: CANCELLED | OUTPATIENT
Start: 2020-04-20

## 2020-04-20 RX ORDER — ATORVASTATIN CALCIUM 40 MG/1
40 TABLET, FILM COATED ORAL NIGHTLY
Qty: 30 TABLET | Refills: 3 | Status: SHIPPED | OUTPATIENT
Start: 2020-04-20

## 2020-04-20 RX ORDER — CLOPIDOGREL BISULFATE 75 MG/1
75 TABLET ORAL DAILY
Status: DISCONTINUED | OUTPATIENT
Start: 2020-04-21 | End: 2020-05-20 | Stop reason: HOSPADM

## 2020-04-20 RX ORDER — LOSARTAN POTASSIUM 100 MG/1
100 TABLET ORAL DAILY
Status: CANCELLED | OUTPATIENT
Start: 2020-04-21

## 2020-04-20 RX ORDER — DIPHENHYDRAMINE HCL 25 MG
25 TABLET ORAL EVERY 6 HOURS PRN
Status: DISCONTINUED | OUTPATIENT
Start: 2020-04-20 | End: 2020-05-20 | Stop reason: HOSPADM

## 2020-04-20 RX ORDER — LOSARTAN POTASSIUM 100 MG/1
100 TABLET ORAL DAILY
Status: DISCONTINUED | OUTPATIENT
Start: 2020-04-21 | End: 2020-04-23 | Stop reason: SDUPTHER

## 2020-04-20 RX ORDER — CLOPIDOGREL BISULFATE 75 MG/1
75 TABLET ORAL DAILY
Status: CANCELLED | OUTPATIENT
Start: 2020-04-21

## 2020-04-20 RX ORDER — TRAMADOL HYDROCHLORIDE 50 MG/1
50 TABLET ORAL EVERY 6 HOURS PRN
Status: DISCONTINUED | OUTPATIENT
Start: 2020-04-20 | End: 2020-05-20 | Stop reason: HOSPADM

## 2020-04-20 RX ORDER — CALCIUM CARBONATE 500(1250)
500 TABLET ORAL DAILY
Status: CANCELLED | OUTPATIENT
Start: 2020-04-21

## 2020-04-20 RX ORDER — FAMOTIDINE 20 MG/1
20 TABLET, FILM COATED ORAL 2 TIMES DAILY
Status: CANCELLED | OUTPATIENT
Start: 2020-04-20

## 2020-04-20 RX ORDER — CLOPIDOGREL BISULFATE 75 MG/1
75 TABLET ORAL DAILY
Qty: 30 TABLET | Refills: 3 | Status: ON HOLD | OUTPATIENT
Start: 2020-04-21 | End: 2020-05-19 | Stop reason: SDUPTHER

## 2020-04-20 RX ORDER — AMLODIPINE BESYLATE 10 MG/1
10 TABLET ORAL DAILY
Qty: 30 TABLET | Refills: 3 | Status: ON HOLD | OUTPATIENT
Start: 2020-04-21 | End: 2020-05-19 | Stop reason: HOSPADM

## 2020-04-20 RX ORDER — ATORVASTATIN CALCIUM 40 MG/1
40 TABLET, FILM COATED ORAL NIGHTLY
Status: DISCONTINUED | OUTPATIENT
Start: 2020-04-20 | End: 2020-05-20 | Stop reason: HOSPADM

## 2020-04-20 RX ORDER — ASPIRIN 81 MG/1
81 TABLET ORAL DAILY
Status: DISCONTINUED | OUTPATIENT
Start: 2020-04-21 | End: 2020-05-20 | Stop reason: HOSPADM

## 2020-04-20 RX ORDER — ONDANSETRON 4 MG/1
4 TABLET, ORALLY DISINTEGRATING ORAL EVERY 8 HOURS PRN
Status: DISCONTINUED | OUTPATIENT
Start: 2020-04-20 | End: 2020-05-20 | Stop reason: HOSPADM

## 2020-04-20 RX ORDER — AMLODIPINE BESYLATE 5 MG/1
10 TABLET ORAL DAILY
Status: CANCELLED | OUTPATIENT
Start: 2020-04-21

## 2020-04-20 RX ORDER — LOSARTAN POTASSIUM 100 MG/1
TABLET ORAL
Status: DISCONTINUED
Start: 2020-04-20 | End: 2020-04-20 | Stop reason: HOSPADM

## 2020-04-20 RX ADMIN — CALCIUM 500 MG: 500 TABLET ORAL at 09:30

## 2020-04-20 RX ADMIN — ENOXAPARIN SODIUM 40 MG: 40 INJECTION SUBCUTANEOUS at 09:31

## 2020-04-20 RX ADMIN — LOSARTAN POTASSIUM 100 MG: 100 TABLET ORAL at 10:28

## 2020-04-20 RX ADMIN — CLOPIDOGREL 75 MG: 75 TABLET, FILM COATED ORAL at 09:31

## 2020-04-20 RX ADMIN — AMLODIPINE BESYLATE 10 MG: 5 TABLET ORAL at 09:30

## 2020-04-20 RX ADMIN — DESMOPRESSIN ACETATE 40 MG: 0.2 TABLET ORAL at 22:24

## 2020-04-20 RX ADMIN — FAMOTIDINE 20 MG: 20 TABLET, FILM COATED ORAL at 09:30

## 2020-04-20 RX ADMIN — FAMOTIDINE 20 MG: 20 TABLET ORAL at 22:24

## 2020-04-20 RX ADMIN — ASPIRIN 81 MG: 81 TABLET, COATED ORAL at 09:30

## 2020-04-20 ASSESSMENT — PAIN SCALES - GENERAL
PAINLEVEL_OUTOF10: 5
PAINLEVEL_OUTOF10: 0

## 2020-04-20 ASSESSMENT — PAIN DESCRIPTION - LOCATION: LOCATION: KNEE

## 2020-04-20 NOTE — PROGRESS NOTES
Patient discharged to ARU at this time. Transported upstairs in bed with all belongings. IV and tele removed. No complications prior to discharge. Family aware of discharge.

## 2020-04-20 NOTE — PLAN OF CARE
Patient requiring assistance standing using the stedy machine x2 assist.  Patient's R side remains weak. NIH currently a 4. Patient currently sitting up in chair.

## 2020-04-20 NOTE — PROGRESS NOTES
Department of Internal Medicine  General Internal Medicine   Progress Note      SUBJECTIVE: right sided weakness some what improved     History obtained from chart review and the patient  General ROS: positive for  - fatigue and malaise  negative for - chills, fever or night sweats  Psychological ROS: negative  Ophthalmic ROS: negative  Respiratory ROS: no cough, shortness of breath, or wheezing  Cardiovascular ROS: no chest pain or dyspnea on exertion  Gastrointestinal ROS: no abdominal pain, change in bowel habits, or black or bloody stools  Genito-Urinary ROS: no dysuria, trouble voiding, or hematuria  Musculoskeletal ROS: negative  Neurological ROS: positive for - gait disturbance, impaired coordination/balance and weakness  negative for - seizures, speech problems or tremors  Dermatological ROS: negative    OBJECTIVE      Medications      Current Facility-Administered Medications: losartan (COZAAR) 100 MG tablet, , ,   clopidogrel (PLAVIX) tablet 75 mg, 75 mg, Oral, Daily  aspirin EC tablet 81 mg, 81 mg, Oral, Daily  amLODIPine (NORVASC) tablet 10 mg, 10 mg, Oral, Daily  calcium elemental (OSCAL) tablet 500 mg, 500 mg, Oral, Daily  famotidine (PEPCID) tablet 20 mg, 20 mg, Oral, BID  losartan (COZAAR) tablet 100 mg, 100 mg, Oral, Daily  enoxaparin (LOVENOX) injection 40 mg, 40 mg, Subcutaneous, Daily  atorvastatin (LIPITOR) tablet 40 mg, 40 mg, Oral, Nightly    Physical      Vitals: BP (!) 121/54   Pulse 101   Temp 98.6 °F (37 °C) (Temporal)   Resp 19   Ht 5' 3\" (1.6 m)   Wt 143 lb 4.3 oz (65 kg)   SpO2 98%   BMI 25.38 kg/m²   Temp: Temp: 98.6 °F (37 °C)  Max: Temp  Av.8 °F (36.6 °C)  Min: 97 °F (36.1 °C)  Max: 98.6 °F (37 °C)  Respiration range:  Resp  Av  Min: 16  Max: 19  Pulse Range:  Pulse  Av.3  Min: 85  Max: 101  Blood pressure range:  Systolic (51HVG), TPL:964 , Min:121 , WOD:253   , Diastolic (37YQF), VRR:33, Min:54, Max:66    SpO2  Av %  Min: 91 %  Max: 98 %    Intake/Output Summary (Last 24 hours) at 2020 1641  Last data filed at 2020 1030  Gross per 24 hour   Intake 260 ml   Output --   Net 260 ml       Vent settings:  Pulse  Av.3  Min: 62  Max: 101  Resp  Av.5  Min: 15  Max: 20  SpO2  Av.7 %  Min: 91 %  Max: 98 %    CONSTITUTIONAL:  fatigued, alert, cooperative, mild distress, appears stated age and normal weight  EYES:  Unremarkable   NECK:  No JVD  and supple, symmetrical, trachea midline  BACK:  symmetric and no curvature  LUNGS:  No increased work of breathing, good air exchange, clear to auscultation bilaterally, no crackles or wheezing  CARDIOVASCULAR:  Normal apical impulse, regular rate and rhythm, normal S1 and S2, no S3 or S4, and no murmur noted  ABDOMEN:  No scars, normal bowel sounds, soft, non-distended, non-tender, no masses palpated, no hepatosplenomegally  MUSCULOSKELETAL:  No distal edema   NEUROLOGIC:  Right sided weakness , positive babinski   SKIN:  Warm and moist  and no bruising or bleeding    Data      Recent Results (from the past 96 hour(s))   POCT Glucose    Collection Time: 20 11:14 AM   Result Value Ref Range    POC Glucose 124 (H) 70 - 99 mg/dl    Performed on ACCU-CHEK    Protime-INR    Collection Time: 20 11:19 AM   Result Value Ref Range    Protime 11.0 10.0 - 13.2 sec    INR 0.95 0.86 - 1.14   APTT    Collection Time: 20 11:19 AM   Result Value Ref Range    aPTT 29.2 24.2 - 36.2 sec   CBC Auto Differential    Collection Time: 20 11:20 AM   Result Value Ref Range    WBC 6.6 4.0 - 11.0 K/uL    RBC 5.38 (H) 4.00 - 5.20 M/uL    Hemoglobin 15.6 12.0 - 16.0 g/dL    Hematocrit 46.7 36.0 - 48.0 %    MCV 86.8 80.0 - 100.0 fL    MCH 29.0 26.0 - 34.0 pg    MCHC 33.4 31.0 - 36.0 g/dL    RDW 13.9 12.4 - 15.4 %    Platelets 286 147 - 186 K/uL    MPV 8.6 5.0 - 10.5 fL    Neutrophils % 69.0 %    Lymphocytes % 24.4 %    Monocytes % 4.7 %    Eosinophils % 1.1 %    Basophils % 0.8 %    Neutrophils Absolute 4.6 1.7 - 7.7

## 2020-04-20 NOTE — CONSULTS
Patient: Rosi Paniagua  [id number]  Date: 4/20/2020      Chief Complaint: Right sided weakness    History of Present Illness/Hospital Course:  80-year-old female with a history of CAD, GERD, HTN, HLD, and DVT who was admitted on 4/17 with right-sided weakness. CT head without acute findings. CTA with 50% stenosis of the left vertebral artery, severe stenosis of the bilateral SHIELA, and severe left PCA stenosis. MRI revealed multiple cortical infarcts in the left frontal lobe within the left SHIELA territory. Echo with normal EF and negative bubble study. No A1c or LDL reported. Neurology evaluated and suggested adding plavix to ASA. She was evaluated by therapy and suggested to continue in an inpatient setting prior to returning home. Prior Level of Function:  Independent    Current Level of Function:  Total assist     has a past medical history of Arthritis, CAD (coronary artery disease), GERD (gastroesophageal reflux disease), Hx of blood clots, Hyperlipidemia, and Hypertension. has a past surgical history that includes Cholecystectomy; Appendectomy; Varicose vein surgery; Endoscopy, colon, diagnostic; eye surgery; joint replacement; and Breast surgery. reports that she has never smoked. She has never used smokeless tobacco. She reports that she does not drink alcohol or use drugs. family history is not contributory      REVIEW OF SYSTEMS:   CONSTITUTIONAL: negative for fevers, chills, diaphoresis, appetite change, fatigue, night sweats and unexpected weight change. HEENT: negative for hearing loss, tinnitus, ear drainage, sinus pressure, nasal congestion, epistaxis and snoring. RESPIRATORY: Negative for hemoptysis, cough, sputum production. CARDIOVASCULAR: negative for chest pain, palpitations, exertional chest pressure/discomfort, edema, syncope. GASTROINTESTINAL: negative for nausea, vomiting, diarrhea, constipation, blood in stool and abdominal pain.   GENITOURINARY: negative for frequency, dysuria, urinary incontinence, decreased urine volume, and hematuria. HEMATOLOGIC/LYMPHATIC: negative for easy bruising, bleeding and lymphadenopathy. ALLERGIC/IMMUNOLOGIC: negative for recurrent infections, angioedema, anaphylaxis and drug reactions. ENDOCRINE: negative for weight changes and diabetic symptoms including polyuria, polydipsia and polyphagia. MUSCULOSKELETAL: negative for pain, joint swelling, decreased range of motion and muscle weakness. NEUROLOGICAL: negative for headaches, slurred speech. Positive unilateral weakness. PSYCHIATRIC/BEHAVIORAL: negative for hallucinations, behavioral problems, confusion and agitation. All pertinent positives are noted in the HPI. Physical Examination:  Vitals:   Patient Vitals for the past 24 hrs:   BP Temp Temp src Pulse Resp SpO2 Weight   04/20/20 0928 -- 98.6 °F (37 °C) Temporal 101 19 -- --   04/20/20 0400 139/60 97 °F (36.1 °C) Temporal 85 16 98 % 143 lb 4.3 oz (65 kg)   04/20/20 0000 (!) 148/58 -- -- 86 -- 91 % --   04/19/20 2132 (!) 146/59 97.7 °F (36.5 °C) Temporal 89 16 96 % --   04/19/20 1633 135/61 98.9 °F (37.2 °C) Temporal 92 16 96 % --   04/19/20 1245 (!) 126/58 98.9 °F (37.2 °C) Temporal 81 16 95 % --     Psych: Stable mood, normal judgement, normal affect. Const: No distress  Eyes: Conjunctiva noninjected, no icterus noted; pupils equal, round. HENT: Atraumatic, normocephalic; Oral mucosa moist  Neck: Trachea midline, neck supple. No thyromegaly noted. CV: No audible murmurs  Resp: No increased WOB, no audible wheezing   GI: Nondistended   Neuro: Alert, oriented, appropriate. LUE 5/5 LLE 4+/5 RUE 3/5 RLE 3/5 HF/KE 2/5 DF/PF  Skin: No visible abnormalities  MSK: No joint abnormalities noted. Ext: No significant edema appreciated. No varicosities.     Lab Results   Component Value Date    WBC 6.6 04/17/2020    HGB 15.6 04/17/2020    HCT 46.7 04/17/2020    MCV 86.8 04/17/2020     04/17/2020     Lab Results hemorrhage. Cierra Nam is no mass effect or midline shift.       There is mild diffuse cerebral volume loss.  There are multiple foci of   abnormal increased T2/FLAIR signal intensity within the periventricular,   subcortical and deep white matter of both hemispheres.  There is no sellar or   suprasellar mass present. Cierra Nam is no ventriculomegaly or abnormal   extra-axial fluid collection present.       ORBITS: Limited evaluation of the orbits is unremarkable.       SINUSES: The paranasal sinuses are clear.  There is trace fluid within the   mastoid air cells.       BONES/SOFT TISSUES: Bone marrow signal intensity is normal.           Impression   Multiple subcentimeter cortical based acute infarcts within the anterior   aspect of the left frontal lobe consistent with acute cortical infarcts   within the left anterior cerebral artery territory.       No acute intracranial hemorrhage.       Mild cerebral volume loss and moderate chronic small vessel ischemic changes. EXAMINATION:   CT OF THE HEAD WITHOUT CONTRAST; CTA OF THE HEAD AND NECK WITH CONTRAST       4/17/2020 11:20 am       TECHNIQUE:   CT of the head was performed without the administration of intravenous   contrast. Dose modulation, iterative reconstruction, and/or weight based   adjustment of the mA/kV was utilized to reduce the radiation dose to as low   as reasonably achievable.; CTA of the head and neck was performed with the   administration of intravenous contrast. Multiplanar reformatted images are   provided for review.  MIP images are provided for review. Stenosis of the   internal carotid arteries measured using NASCET criteria. Dose modulation,   iterative reconstruction, and/or weight based adjustment of the mA/kV was   utilized to reduce the radiation dose to as low as reasonably achievable.    Noncontrast CT of the head with reconstructed 2-D images are also provided   for review.       COMPARISON:   None       HISTORY:   [address]

## 2020-04-20 NOTE — PROGRESS NOTES
Physical Therapy  Facility/Department: [facility] ICU  Daily Treatment Note  NAME: Agnieszka Mendoza  : 1934  MRN: [id number]    Date of Service: 2020    Discharge Recommendations: Hanna Ceballos scored a 8/24 on the AM-PAC short mobility form. Current research shows that an AM-PAC score of 17 or less is typically not associated with a discharge to the patient's home setting. Based on the patients AM-PAC score and their current functional mobility deficits, it is recommended that the patient have 5-7 sessions per week of Physical Therapy at d/c to increase the patients independence. If patient discharges prior to next session this note will serve as a discharge summary. Please see below for the latest assessment towards goals. PT Equipment Recommendations  Equipment Needed: No  Other: Defer to next level of care. Assessment   Body structures, Functions, Activity limitations: Decreased functional mobility ; Decreased strength;Decreased posture;Decreased safe awareness;Decreased balance  Assessment: Functional status remains similar to previous treatment session but still declined from evaluation. 3-/5 strength in RLE. Poor sitting and standing balance with right lateral lean. Able to correct with cues but unable to maintain. Continued skilled PT to promote return to PLOF. Treatment Diagnosis: RLE weakness, impaired balance, ambulation deficits  Prognosis: Fair  PT Education: Transfer Training;Functional Mobility Training  Patient Education: Patient verbalized understanding of all education provided but likely needs reiteration. Barriers to Learning: None evident. Possible cognitive deficits  REQUIRES PT FOLLOW UP: Yes  Activity Tolerance  Activity Tolerance: Patient Tolerated treatment well     Patient Diagnosis(es): The primary encounter diagnosis was Acute focal neurological deficit, onset within 3-24 hours.  Diagnoses of Right leg weakness and Dizziness were also pertinent to this

## 2020-04-21 ENCOUNTER — APPOINTMENT (OUTPATIENT)
Dept: MRI IMAGING | Age: 85
DRG: 057 | End: 2020-04-21
Attending: PHYSICAL MEDICINE & REHABILITATION
Payer: MEDICARE

## 2020-04-21 LAB
ANION GAP SERPL CALCULATED.3IONS-SCNC: 12 MMOL/L (ref 3–16)
BUN BLDV-MCNC: 35 MG/DL (ref 7–20)
CALCIUM SERPL-MCNC: 9.4 MG/DL (ref 8.3–10.6)
CHLORIDE BLD-SCNC: 97 MMOL/L (ref 99–110)
CO2: 25 MMOL/L (ref 21–32)
CREAT SERPL-MCNC: 1 MG/DL (ref 0.6–1.2)
GFR AFRICAN AMERICAN: >60
GFR NON-AFRICAN AMERICAN: 53
GLUCOSE BLD-MCNC: 136 MG/DL (ref 70–99)
HCT VFR BLD CALC: 38.4 % (ref 36–48)
HEMOGLOBIN: 12.7 G/DL (ref 12–16)
MCH RBC QN AUTO: 28.8 PG (ref 26–34)
MCHC RBC AUTO-ENTMCNC: 33.2 G/DL (ref 31–36)
MCV RBC AUTO: 86.6 FL (ref 80–100)
PDW BLD-RTO: 14.2 % (ref 12.4–15.4)
PLATELET # BLD: 267 K/UL (ref 135–450)
PMV BLD AUTO: 9.4 FL (ref 5–10.5)
POTASSIUM SERPL-SCNC: 3.9 MMOL/L (ref 3.5–5.1)
RBC # BLD: 4.43 M/UL (ref 4–5.2)
SODIUM BLD-SCNC: 134 MMOL/L (ref 136–145)
WBC # BLD: 12 K/UL (ref 4–11)

## 2020-04-21 PROCEDURE — 6360000002 HC RX W HCPCS: Performed by: PHYSICAL MEDICINE & REHABILITATION

## 2020-04-21 PROCEDURE — 70551 MRI BRAIN STEM W/O DYE: CPT

## 2020-04-21 PROCEDURE — 99223 1ST HOSP IP/OBS HIGH 75: CPT | Performed by: PSYCHIATRY & NEUROLOGY

## 2020-04-21 PROCEDURE — 6370000000 HC RX 637 (ALT 250 FOR IP): Performed by: PHYSICAL MEDICINE & REHABILITATION

## 2020-04-21 PROCEDURE — 97535 SELF CARE MNGMENT TRAINING: CPT

## 2020-04-21 PROCEDURE — 85027 COMPLETE CBC AUTOMATED: CPT

## 2020-04-21 PROCEDURE — 97162 PT EVAL MOD COMPLEX 30 MIN: CPT

## 2020-04-21 PROCEDURE — 97530 THERAPEUTIC ACTIVITIES: CPT

## 2020-04-21 PROCEDURE — 92523 SPEECH SOUND LANG COMPREHEN: CPT

## 2020-04-21 PROCEDURE — 97166 OT EVAL MOD COMPLEX 45 MIN: CPT

## 2020-04-21 PROCEDURE — 80048 BASIC METABOLIC PNL TOTAL CA: CPT

## 2020-04-21 PROCEDURE — 1280000000 HC REHAB R&B

## 2020-04-21 PROCEDURE — 36415 COLL VENOUS BLD VENIPUNCTURE: CPT

## 2020-04-21 PROCEDURE — 92610 EVALUATE SWALLOWING FUNCTION: CPT

## 2020-04-21 RX ORDER — ACETAMINOPHEN 325 MG/1
650 TABLET ORAL EVERY 4 HOURS PRN
Status: DISCONTINUED | OUTPATIENT
Start: 2020-04-21 | End: 2020-05-20 | Stop reason: HOSPADM

## 2020-04-21 RX ADMIN — DESMOPRESSIN ACETATE 40 MG: 0.2 TABLET ORAL at 20:53

## 2020-04-21 RX ADMIN — LOSARTAN POTASSIUM 100 MG: 100 TABLET ORAL at 08:46

## 2020-04-21 RX ADMIN — AMLODIPINE BESYLATE 10 MG: 5 TABLET ORAL at 08:28

## 2020-04-21 RX ADMIN — ENOXAPARIN SODIUM 40 MG: 40 INJECTION SUBCUTANEOUS at 08:27

## 2020-04-21 RX ADMIN — ASPIRIN 81 MG: 81 TABLET, COATED ORAL at 08:28

## 2020-04-21 RX ADMIN — TRAMADOL HYDROCHLORIDE 50 MG: 50 TABLET, FILM COATED ORAL at 05:15

## 2020-04-21 RX ADMIN — FAMOTIDINE 20 MG: 20 TABLET ORAL at 20:53

## 2020-04-21 RX ADMIN — FAMOTIDINE 20 MG: 20 TABLET ORAL at 08:28

## 2020-04-21 RX ADMIN — CLOPIDOGREL 75 MG: 75 TABLET, FILM COATED ORAL at 08:28

## 2020-04-21 RX ADMIN — CALCIUM 500 MG: 500 TABLET ORAL at 08:28

## 2020-04-21 ASSESSMENT — PAIN SCALES - GENERAL
PAINLEVEL_OUTOF10: 0
PAINLEVEL_OUTOF10: 3
PAINLEVEL_OUTOF10: 5
PAINLEVEL_OUTOF10: 6

## 2020-04-21 ASSESSMENT — PAIN DESCRIPTION - LOCATION: LOCATION: KNEE

## 2020-04-21 ASSESSMENT — PAIN DESCRIPTION - ORIENTATION: ORIENTATION: LEFT

## 2020-04-21 NOTE — PROGRESS NOTES
Occupational Therapy   Occupational Therapy Initial Assessment  Date: 2020   Patient Name: Francesco Hussein  MRN: [id number]     : 1934    Date of Service: 2020    Discharge Recommendations:  24 hour supervision or assist, S Level 3  OT Equipment Recommendations  Other: continue to assess    Assessment   Performance deficits / Impairments: Decreased functional mobility ; Decreased ADL status; Decreased cognition;Decreased safe awareness;Decreased endurance;Decreased vision/visual deficit; Decreased balance;Decreased high-level IADLs  Assessment: Patient presents with the above deficits impacting occupational performance and functioning below baseline level. Recommend skilled OT to address deficits and promote functional independence. Significant hemiparesis and expressive and receptive aphasia   Treatment Diagnosis: Decreased functional mobility, ADL status, decreased safety, decreased cognition, decreased balance and endurance associated with CVA  Prognosis: Good  OT Education: OT Role;Plan of Care;ADL Adaptive Strategies;Transfer Training;Orientation  Patient Education: Pt did not verbalize independent understanding of education taught during session-would benefit from continued reinforcement  Barriers to Learning: cognition, aphasia   REQUIRES OT FOLLOW UP: Yes  Activity Tolerance  Activity Tolerance: Treatment limited secondary to decreased cognition;Patient limited by fatigue           Patient Diagnosis(es): CVA     has a past medical history of Arthritis, CAD (coronary artery disease), GERD (gastroesophageal reflux disease), Hx of blood clots, Hyperlipidemia, and Hypertension. has a past surgical history that includes Cholecystectomy; Appendectomy; Varicose vein surgery; Endoscopy, colon, diagnostic; eye surgery; joint replacement; and Breast surgery.     Treatment Diagnosis: Decreased functional mobility, ADL status, decreased safety, decreased cognition, decreased balance and endurance

## 2020-04-21 NOTE — PROGRESS NOTES
Attempted to visit patient as requested by patient's nephew, Maryann Cuevas. Patient sleeping. Left spiritual care card w/note at bedside.

## 2020-04-21 NOTE — CONSULTS
artery  in this patient. Continue dual antiplatelet therapy with Plavix as well as low-dose aspirin  Try to maintain systolic blood pressure around 130 or 140 mmHg  Physical therapy and Occupational Therapy evaluation  Patient now has new onset aphasia and her swallowing may be significantly impaired compared to before. High risk patient because of recurrent acute strokes        Please note a portion of this chart was generated using dragon dictation software. Although every effort was made to ensure the accuracy of this automated transcription, some errors in transcription may have occurred.

## 2020-04-21 NOTE — PROGRESS NOTES
slightly impulsive with rate of intake. Attempted to provide pills in puree (from RN) however pt stripping and chewing the pills. Pt had difficulty following single step commands for OME but overall strength and coordination was moderately impaired. At this time recommend diet downgrade to soft and bite sized solid (dysphagia III), continue with thin liquids, strict aspiration precautions and diet tolerance monitoring. Continue to provide pills with water one at a time but will need close supervision. Treatment Plan  Requires SLP Intervention: Yes  Duration/Frequency of Treatment: 60 minutes/day; 5 days/week  D/C Recommendations: To be determined       Recommended Diet and Intervention  Diet Solids Recommendation: Dysphagia Soft and Bite-Sized (Dysphagia III)  Liquid Consistency Recommendation: Thin  Recommended Form of Meds: Whole with water(One at a time )  Recommendations: Dysphagia treatment  Therapeutic Interventions: Diet tolerance monitoring;Patient/Family education; Laryngeal exercises; Pharyngeal exercises; Vital Stim/NMES    Compensatory Swallowing Strategies  Compensatory Swallowing Strategies: Small bites/sips;Eat/Feed slowly; No straws;Upright as possible for all oral intake    Treatment/Goals  Short-term Goals  Goal 1: Pt will tolerate recommended diet and regular solids with no overt s/s of aspiration, noticeable fatigue or significant pocketing. General  Subjective    Behavior/Cognition: Alert; Cooperative; Requires cueing  Respiratory Status: Room air  Communication Observation: Aphasia; Apraxia  Follows Directions: Simple  Dentition: Adequate  Patient Positioning: Upright in chair  Baseline Vocal Quality: Normal  Consistencies Administered: Dysphagia Soft and Bite-Sized (Dysphagia III); Dysphagia Pureed (Dysphagia I); Thin;Mixed Consistencies         Vision/Hearing  Vision  Vision: Impaired  Vision Exceptions: Wears glasses at all times  Hearing  Hearing: Exceptions to [facility]  Hearing Exceptions: Bilateral hearing aid    Oral Motor Deficits  Oral/Motor  Oral Motor: Exceptions to WFL(Difficult to adequately assess due to receptive aphasia and suspected apraxia )  Labial Strength: Reduced  Labial Coordination: Reduced  Lingual Strength: Reduced  Lingual Coordination: Reduced    Oral Phase Dysfunction  Oral Phase  Oral Phase: Exceptions  Oral Phase Dysfunction  Impaired Mastication: Soft Solid; Reg Solid  Suspected Premature Bolus Loss: Thin;Medications     Indicators of Pharyngeal Phase Dysfunction   Pharyngeal Phase  Pharyngeal Phase: Exceptions  Indicators of Pharyngeal Phase Dysfunction  Delayed Swallow: All  Decreased Laryngeal Elevation: All    Prognosis  Prognosis  Prognosis for safe diet advancement: good  Barriers to reach goals: cognitive deficits;language deficits  Individuals consulted  Consulted and agree with results and recommendations: Patient    Education  Patient Education: Provided education to pt (limited understanding) and RN re rationale for dysphagia evaluation, diet recommendations, and concern for decline in function. Patient Education Response: No evidence of learning  Safety Devices in place: Yes  Type of devices: All fall risk precautions in place     Pain:  Pain Assessment  Pain Assessment: 0-10  Pain Level: 3  Patient's Stated Pain Goal: No pain  Pain Location: Knee  Response to Pain Intervention: Asleep with RR greater than 10         Therapy Time  SLP Individual Minutes  Time In: 6879  Time Out: 0915  Minutes: 20        Signature:   Srinivasan Turk M.A.  [facility]-SLP S.PMaximiliano [id number]  Speech-Language Pathologist [phone]  4/21/2020 3:07 PM

## 2020-04-21 NOTE — CARE COORDINATION
Social Work Admission Assessment    Consult noted and pt's chart reviewed. Attempted to meet with pt, she is sleeping at this time. Called pt's spouse but pt's dtr answered. Per dtr: Pt lives with spouse at home in a two story house with a full bathroom on first floor, no home care prior. Pt already has a walker and wheelchair at home. Pt was an active  prior. Dtr is uncertain if pt has advance directives in place, no documents in Epic. Provided this worker's number for dtr to contact as needed. Will continue to follow for support and discharge planning.  Melissa Dutton, MSW, LSW

## 2020-04-21 NOTE — H&P
Patient: Francesco Hussein  [id number]  Date: 4/21/2020      Chief Complaint: Right sided weakness     History of Present Illness/Hospital Course:  42-year-old female with a history of CAD, GERD, HTN, HLD, and DVT who was admitted on 4/17 with right-sided weakness. CT head without acute findings. CTA with 50% stenosis of the left vertebral artery, severe stenosis of the bilateral SHIELA, and severe left PCA stenosis. MRI revealed multiple cortical infarcts in the left frontal lobe within the left SHIELA territory. Echo with normal EF and negative bubble study. No A1c or LDL reported. Neurology evaluated and suggested adding plavix to ASA. She was evaluated by therapy and suggested to continue in an inpatient setting prior to returning home. She is more aphasic compared to karin yesterday.     Prior Level of Function:  Independent     Current Level of Function:  Total assist     has a past medical history of Arthritis, CAD (coronary artery disease), GERD (gastroesophageal reflux disease), Hx of blood clots, Hyperlipidemia, and Hypertension. has a past surgical history that includes Cholecystectomy; Appendectomy; Varicose vein surgery; Endoscopy, colon, diagnostic; eye surgery; joint replacement; and Breast surgery. reports that she has never smoked. She has never used smokeless tobacco. She reports that she does not drink alcohol or use drugs. family history is not contributory    Allergies: Amoxicillin; Bupivacaine hcl; Lidocaine; Iodides; Ketamine; Lisinopril; Sulfa antibiotics;  Triamcinolone; and Iohexol    Current Facility-Administered Medications   Medication Dose Route Frequency Provider Last Rate Last Dose    amLODIPine (NORVASC) tablet 10 mg  10 mg Oral Daily Sola Spaulding MD   10 mg at 04/21/20 0828    aspirin EC tablet 81 mg  81 mg Oral Daily Sola Spaulding MD   81 mg at 04/21/20 0828    atorvastatin (LIPITOR) tablet 40 mg  40 mg Oral Nightly Sola Spaulding MD   40 mg at 04/20/20 9048  calcium elemental (OSCAL) tablet 500 mg  500 mg Oral Daily Sam Clifford MD   500 mg at 04/21/20 0828    clopidogrel (PLAVIX) tablet 75 mg  75 mg Oral Daily Sam Clifford MD   75 mg at 04/21/20 0828    enoxaparin (LOVENOX) injection 40 mg  40 mg Subcutaneous Daily Sam Clifford MD   40 mg at 04/21/20 0827    famotidine (PEPCID) tablet 20 mg  20 mg Oral BID Sam Clifford MD   20 mg at 04/21/20 8388    losartan (COZAAR) tablet 100 mg  100 mg Oral Daily Sam Clifford MD   100 mg at 04/21/20 0846    diphenhydrAMINE (BENADRYL) tablet 25 mg  25 mg Oral Q6H PRN Sam Clifford MD        hydrALAZINE (APRESOLINE) tablet 50 mg  50 mg Oral Q8H PRN Sam Clifford MD        ondansetron (ZOFRAN-ODT) disintegrating tablet 4 mg  4 mg Oral Q8H PRN Sam Clifford MD        traZODone (DESYREL) tablet 50 mg  50 mg Oral Nightly PRN Sam Clifford MD        traMADol Erlene Bud) tablet 50 mg  50 mg Oral Q6H PRN Sam Clifford MD   50 mg at 04/21/20 0515       REVIEW OF SYSTEMS:   Unable to obtain reliably. Physical Examination:  Vitals:   Patient Vitals for the past 24 hrs:   BP Temp Temp src Pulse Resp SpO2   04/21/20 0804 132/69 98.1 °F (36.7 °C) Oral 81 16 91 %   04/20/20 2200 126/68 99.1 °F (37.3 °C) Oral 90 18 96 %   04/20/20 1833 136/77 97.6 °F (36.4 °C) Oral 88 20 --     Psych: Stable mood, normal affect   Const: No distress  Eyes: Conjunctiva noninjected, no icterus noted; pupils equal, round. HENT: Atraumatic, normocephalic; Oral mucosa moist  Neck: Trachea midline, neck supple. No thyromegaly noted. CV: Regular rate and rhythm, no murmur rub or gallop noted  Resp: Lungs clear to auscultation bilaterally, no rales wheezes or ronchi, no retractions. Respirations unlabored. GI: Soft, nontender, nondistended. Normal bowel sounds. No palpable masses. Neuro: Alert, oriented x1, able to follow simple commands, appropriate. No cranial nerve deficits appreciated.  Sensation intact to light touch. Motor examination reveals: LUE 5/5 LLE 4+/5 RUE 0/5 RLE 0/5. Reflexes absent on right  Skin: Normal temperature and turgor  MSK: No joint abnormalities noted. Ext: No significant edema appreciated. No varicosities. Lab Results   Component Value Date    WBC 6.6 04/17/2020    HGB 15.6 04/17/2020    HCT 46.7 04/17/2020    MCV 86.8 04/17/2020     04/17/2020     Lab Results   Component Value Date    INR 0.95 04/17/2020    PROTIME 11.0 04/17/2020     Lab Results   Component Value Date    CREATININE 0.6 04/17/2020    BUN 15 04/17/2020     04/17/2020    K 4.2 04/17/2020     04/17/2020    CO2 27 04/17/2020     Lab Results   Component Value Date    ALT 12 04/17/2020    AST 21 04/17/2020    ALKPHOS 112 04/17/2020    BILITOT 1.2 (H) 04/17/2020       Most recent echocardiogram revealed   Procedure     Type of Study      TTE procedure:ECHOCARDIOGRAM COMPLETE 2D W DOPPLER W COLOR.     Procedure Date  Date: 04/18/2020 Start: 10:11 AM     Study Location: [facility] - Echo Lab  Technical Quality: Adequate visualization     Indications:CVA.     Patient Status: Routine     Height: 63 inches Weight: 138 pounds BSA: 1.65 m2 BMI: 24.45 kg/m2     HR: 77 bpm BP: 150/68 mmHg      Conclusions      Summary   -Normal left ventricle size, wall thickness, and systolic function with an   estimated ejection fraction of 55%.  -No regional wall motion abnormalities are seen.   -Grade I diastolic dysfunction with normal LV filling pressures.  Avg.   E/e'=11.55   -A bubble study was performed and fails to show evidence of shunting.     Most recent imaging studies revealed   v  EXAMINATION:   MRI OF THE BRAIN WITHOUT CONTRAST  4/18/2020 8:29 am       TECHNIQUE:   Multiplanar multisequence MRI of the brain was performed without the   administration of intravenous contrast.       COMPARISON:   CT angiogram brain 04/17/2020       HISTORY:   ORDERING SYSTEM PROVIDED HISTORY: stroke   TECHNOLOGIST PROVIDED HISTORY:   Reason for exam:->stroke   Reason for Exam: Pt states right sided weakness x 1 day, ataxia   Acuity: Acute   Type of Exam: Initial       FINDINGS:   INTRACRANIAL STRUCTURES/VENTRICLES: There are multiple (greater than 15)   subcentimeter cortical based foci of restricted diffusion within the left   frontal lobe consistent with acute cortical infarcts.  There is no acute   intracranial hemorrhage.  There is no mass effect or midline shift.       There is mild diffuse cerebral volume loss.  There are multiple foci of   abnormal increased T2/FLAIR signal intensity within the periventricular,   subcortical and deep white matter of both hemispheres.  There is no sellar or   suprasellar mass present. Sissy Seat is no ventriculomegaly or abnormal   extra-axial fluid collection present.       ORBITS: Limited evaluation of the orbits is unremarkable.       SINUSES: The paranasal sinuses are clear.  There is trace fluid within the   mastoid air cells.       BONES/SOFT TISSUES: Bone marrow signal intensity is normal.           Impression   Multiple subcentimeter cortical based acute infarcts within the anterior   aspect of the left frontal lobe consistent with acute cortical infarcts   within the left anterior cerebral artery territory.       No acute intracranial hemorrhage.       Mild cerebral volume loss and moderate chronic small vessel ischemic changes.      EXAMINATION:   CT OF THE HEAD WITHOUT CONTRAST; CTA OF THE HEAD AND NECK WITH CONTRAST       4/17/2020 11:20 am       TECHNIQUE:   CT of the head was performed without the administration of intravenous   contrast. Dose modulation, iterative reconstruction, and/or weight based   adjustment of the mA/kV was utilized to reduce the radiation dose to as low   as reasonably achievable.; CTA of the head and neck was performed with the   administration of intravenous contrast. Multiplanar reformatted images are   provided for review.   MIP images are provided for

## 2020-04-21 NOTE — PROGRESS NOTES
Pt is incontinent bladder and bowel. Diaper changed and repositioned. Pt moans in pain when the rt knee is touched or bend. PRN tramadol 50mg given as ordered. Ice pack applied on the rt knee.

## 2020-04-21 NOTE — PROGRESS NOTES
understanding) and RN re decline in function. MD aware and ordered MRI. Patient Education Response: No evidence of learning  Safety Devices in place: Yes  Type of devices: All fall risk precautions in place     Pain:  Pain Assessment  Pain Assessment: 0-10  Pain Level: 3  Patient's Stated Pain Goal: No pain  Pain Location: Knee  Response to Pain Intervention: Asleep with RR greater than 10      Therapy Time:   Individual Concurrent Group Co-treatment   Time In 0830         Time Out 0855         Minutes 25                 Signature:   Abner Light M.A.  CCC-SLP S.P. [id number]  Speech-Language Pathologist [phone]  4/21/2020 3:23 PM

## 2020-04-22 ENCOUNTER — APPOINTMENT (OUTPATIENT)
Dept: GENERAL RADIOLOGY | Age: 85
DRG: 057 | End: 2020-04-22
Attending: PHYSICAL MEDICINE & REHABILITATION
Payer: MEDICARE

## 2020-04-22 LAB
BILIRUBIN URINE: NEGATIVE
BLOOD, URINE: NEGATIVE
CLARITY: CLEAR
COLOR: YELLOW
EPITHELIAL CELLS, UA: 0 /HPF (ref 0–5)
GLUCOSE URINE: NEGATIVE MG/DL
HCT VFR BLD CALC: 38.9 % (ref 36–48)
HEMOGLOBIN: 12.8 G/DL (ref 12–16)
HYALINE CASTS: 1 /LPF (ref 0–8)
KETONES, URINE: NEGATIVE MG/DL
LEUKOCYTE ESTERASE, URINE: NEGATIVE
MCH RBC QN AUTO: 28.5 PG (ref 26–34)
MCHC RBC AUTO-ENTMCNC: 33 G/DL (ref 31–36)
MCV RBC AUTO: 86.3 FL (ref 80–100)
MICROSCOPIC EXAMINATION: YES
NITRITE, URINE: NEGATIVE
PDW BLD-RTO: 14.1 % (ref 12.4–15.4)
PH UA: 5.5 (ref 5–8)
PLATELET # BLD: 304 K/UL (ref 135–450)
PMV BLD AUTO: 9 FL (ref 5–10.5)
PROTEIN UA: 30 MG/DL
RBC # BLD: 4.5 M/UL (ref 4–5.2)
RBC UA: 4 /HPF (ref 0–4)
SPECIFIC GRAVITY UA: 1.02 (ref 1–1.03)
URINE REFLEX TO CULTURE: ABNORMAL
URINE TYPE: ABNORMAL
UROBILINOGEN, URINE: 0.2 E.U./DL
WBC # BLD: 12.4 K/UL (ref 4–11)
WBC UA: 0 /HPF (ref 0–5)

## 2020-04-22 PROCEDURE — 71045 X-RAY EXAM CHEST 1 VIEW: CPT

## 2020-04-22 PROCEDURE — 97530 THERAPEUTIC ACTIVITIES: CPT

## 2020-04-22 PROCEDURE — 51701 INSERT BLADDER CATHETER: CPT

## 2020-04-22 PROCEDURE — 92526 ORAL FUNCTION THERAPY: CPT

## 2020-04-22 PROCEDURE — 81001 URINALYSIS AUTO W/SCOPE: CPT

## 2020-04-22 PROCEDURE — 1280000000 HC REHAB R&B

## 2020-04-22 PROCEDURE — 92507 TX SP LANG VOICE COMM INDIV: CPT

## 2020-04-22 PROCEDURE — 99232 SBSQ HOSP IP/OBS MODERATE 35: CPT | Performed by: PSYCHIATRY & NEUROLOGY

## 2020-04-22 PROCEDURE — 6370000000 HC RX 637 (ALT 250 FOR IP): Performed by: PHYSICAL MEDICINE & REHABILITATION

## 2020-04-22 PROCEDURE — 85027 COMPLETE CBC AUTOMATED: CPT

## 2020-04-22 PROCEDURE — 36415 COLL VENOUS BLD VENIPUNCTURE: CPT

## 2020-04-22 PROCEDURE — 97535 SELF CARE MNGMENT TRAINING: CPT

## 2020-04-22 PROCEDURE — 6360000002 HC RX W HCPCS: Performed by: PHYSICAL MEDICINE & REHABILITATION

## 2020-04-22 RX ORDER — LOSARTAN POTASSIUM 100 MG/1
TABLET ORAL
Status: DISPENSED
Start: 2020-04-22 | End: 2020-04-22

## 2020-04-22 RX ADMIN — FAMOTIDINE 20 MG: 20 TABLET ORAL at 22:04

## 2020-04-22 RX ADMIN — FAMOTIDINE 20 MG: 20 TABLET ORAL at 08:05

## 2020-04-22 RX ADMIN — DESMOPRESSIN ACETATE 40 MG: 0.2 TABLET ORAL at 22:04

## 2020-04-22 RX ADMIN — TRAMADOL HYDROCHLORIDE 50 MG: 50 TABLET, FILM COATED ORAL at 17:37

## 2020-04-22 RX ADMIN — CLOPIDOGREL 75 MG: 75 TABLET, FILM COATED ORAL at 08:05

## 2020-04-22 RX ADMIN — TRAMADOL HYDROCHLORIDE 50 MG: 50 TABLET, FILM COATED ORAL at 06:39

## 2020-04-22 RX ADMIN — ENOXAPARIN SODIUM 40 MG: 40 INJECTION SUBCUTANEOUS at 08:06

## 2020-04-22 RX ADMIN — CALCIUM 500 MG: 500 TABLET ORAL at 08:05

## 2020-04-22 RX ADMIN — ASPIRIN 81 MG: 81 TABLET, COATED ORAL at 08:05

## 2020-04-22 RX ADMIN — AMLODIPINE BESYLATE 10 MG: 5 TABLET ORAL at 08:05

## 2020-04-22 RX ADMIN — LOSARTAN POTASSIUM 100 MG: 100 TABLET ORAL at 08:12

## 2020-04-22 ASSESSMENT — PAIN SCALES - WONG BAKER: WONGBAKER_NUMERICALRESPONSE: 4

## 2020-04-22 ASSESSMENT — PAIN SCALES - GENERAL
PAINLEVEL_OUTOF10: 0
PAINLEVEL_OUTOF10: 4
PAINLEVEL_OUTOF10: 0
PAINLEVEL_OUTOF10: 5

## 2020-04-22 ASSESSMENT — PAIN DESCRIPTION - ONSET: ONSET: ON-GOING

## 2020-04-22 ASSESSMENT — PAIN DESCRIPTION - ORIENTATION: ORIENTATION: LEFT

## 2020-04-22 ASSESSMENT — PAIN DESCRIPTION - LOCATION: LOCATION: ARM;LEG

## 2020-04-22 ASSESSMENT — PAIN DESCRIPTION - FREQUENCY: FREQUENCY: INTERMITTENT

## 2020-04-22 ASSESSMENT — PAIN DESCRIPTION - DESCRIPTORS: DESCRIPTORS: PATIENT UNABLE TO DESCRIBE

## 2020-04-22 NOTE — PATIENT CARE CONFERENCE
[facility]  Inpatient Rehabilitation  Weekly Team Conference Note    Patient Name: Gavin Mcconnell        MRN: [id number]    : 1934  (80 y.o.)  Gender: female      Diagnosis: L frontal CVA     The team conference for this patient was held on 20 at 11:00am by:  Eros Norris MD    CASE MANAGEMENT:  Assessment: Per dtr: Pt lives with spouse at home in a two story house with a full bathroom on first floor, no home care prior. Pt already has a walker and wheelchair at home. Pt was an active  prior. Dtr is uncertain if pt has advance directives in place, no documents in Epic.     PSYCHOLOGY:  Assessment:     PHYSICAL THERAPY:    Bed Mobility:   Scootin Person assistance    Transfers:  Sit to Stand: Dependent/Total, 2 Person Assistance(2 person Max & Mod)  Stand to sit: 2 Person Assistance  Bed to Chair: Dependent/Total(Mod A and Max A with use of Stedy)              QM:  Roll Left and Right  Assistance Needed: Substantial/maximal assistance  CARE Score: 2  Discharge Goal: Independent  Sit to Lying  Assistance Needed: Dependent  CARE Score: 1  Discharge Goal: Supervision or touching assistance  Lying to Sitting on Side of Bed  Assistance Needed: Dependent  CARE Score: 1  Discharge Goal: Supervision or touching assistance  Sit to Stand  Assistance Needed: Dependent  Reason if not Attempted: Not attempted due to medical condition or safety concerns  CARE Score: 1  Discharge Goal: Supervision or touching assistance  Chair/Bed-to-Chair Transfer  Assistance Needed: Dependent  CARE Score: 1  Discharge Goal: Supervision or touching assistance  Car Transfer  Reason if not Attempted: Not attempted due to medical condition or safety concerns  CARE Score: 88  Discharge Goal: Supervision or touching assistance  Walk 10 Feet  Reason if not Attempted: Not attempted due to medical condition or safety concerns  CARE Score: 88  Discharge Goal: Supervision or touching assistance  Walk 50

## 2020-04-22 NOTE — PROGRESS NOTES
Pt is incontinent of bladder and bowel x2 . Diaper changed and repositioned . Pt still moans in pain when turned or repositioned. PRN tramadol 50mg given as ordered.

## 2020-04-22 NOTE — PLAN OF CARE
prophylaxis  [x] assistance with in room safety with transfers to bed, toilet, wheelchair, shower   [] bathroom activities and hygiene  [] Other:    Patient/Caregiver Education for:  [] Disease/sustained injury/management     [] Medication Use  [] Surgical intervention  [x] Safety  [x] Body mechanics and or joint protection  [x] Health maintenance     [] Other:     PHYSICAL THERAPY:  Goals:                  Short term goals  Time Frame for Short term goals: 1 week  Short term goal 1: Pt will perform bed mobilty with Mod A 1  Short term goal 2: Pt will transfer bed to chair with Mod A 2  Short term goal 3: Pt will tolerate sitting EOB for functional task x 5 minutes with CGA  Short term goal 4: Pt will  parallel bars with Mod A 2            Long term goals  Time Frame for Long term goals : 3 weeks  Long term goal 1: Pt will be mod I for bed mobility  Long term goal 2: Pt will transfer bed to chair with CGA  Long term goal 3: Pt will propel w/c 48' with Mod I  Long term goal 4: Pt will ambulate 22' with AAD and CGA  Long term goal 5: Pt will ascend/descend ramp with Min A  These goals were reviewed with this patient at the time of assessment and Caryl Fontenot is in agreement.      Plan of Care: Pt to be seen 5 out of 7 days per week per ARU protocol ( 60 minutes with PT)                  Current Treatment Recommendations: Strengthening, ROM, Balance Training, Functional Mobility Training, Transfer Training, Endurance Training, Wheelchair Mobility Training, Gait Training, Stair training, Neuromuscular Re-education, Pain Management, Safety Education & Training, Home Exercise Program, Patient/Caregiver Education & Training, Equipment Evaluation, Education, & procurement, Modalities, Positioning    OCCUPATIONAL THERAPY:  Goals:             Short term goals  Time Frame for Short term goals: 1 week   Short term goal 1: mod A of 1 functional transfers   Short term goal 2: mod A of 1 UB bathing/dressing   Short left frontal lobe within the left SHIELA territory.  Echo with normal EF and negative bubble study.  No A1c or LDL reported.  Neurology evaluated and suggested adding plavix to ASA. She was evaluated by therapy and suggested to continue in an inpatient setting prior to returning home. She was admitted with the above goal.      I have reviewed this initial plan of care and agree with its contents:    Title   Name    Date    Time    Physician: Electronically signed by Francisco oDdge MD on 4/23/2020 at 12:03 PM      Case Mgmt: CHARLES Holder, [facility], 4/22/2020, 1027    OT: Leo Urbina OTR/L [id number], 4/22/2020, 2:55 PM      PT: Radha Morales PT [id number],  C/NDT, 4/23/20, 08:17    RN: Kaiser Morse RN 4/22/20, 1200    ST: Ellie Seng, [address] [place] [address] Road 4/22/2020 1335    : Rogerio Zuniga, [address] [place] [address], 4/22/2020 1152    Other:

## 2020-04-22 NOTE — PROGRESS NOTES
NEUROLOGY FOLLOWUP    HISTORY OF PRESENT ILLNESS :     Steven Forde is a 80 y.o. female   History was obtained from the dictations in the chart. Patient's aphasia seems slightly improved and she is talking a few words today. Her right hemiparesis also seems slightly improved. No other new neurological symptoms. REVIEW OF SYSTEMS     Unable to obtain due to aphasia  Constitutional:  []   Chills   []  Fatigue   []  Fevers   []  Malaise   []  Weight loss     [] Denies all of the above    Respiratory:   []  Cough    []  Shortness of breath         [] Denies all of the above     Cardiovascular:   []  Chest pain    []  Exertional chest pressure/discomfort           [] Palpitations    []  Syncope     [] Denies all of the above    Past Medical History:   Diagnosis Date    Arthritis     CAD (coronary artery disease)     GERD (gastroesophageal reflux disease)     Hx of blood clots     phlebitis    Hyperlipidemia     Hypertension      History reviewed. No pertinent family history.   Social History     Socioeconomic History    Marital status:      Spouse name: None    Number of children: None    Years of education: None    Highest education level: None   Occupational History    None   Social Needs    Financial resource strain: None    Food insecurity     Worry: None     Inability: None    Transportation needs     Medical: None     Non-medical: None   Tobacco Use    Smoking status: Never Smoker    Smokeless tobacco: Never Used   Substance and Sexual Activity    Alcohol use: No    Drug use: No    Sexual activity: Yes     Partners: Male   Lifestyle    Physical activity     Days per week: None     Minutes per session: None    Stress: None   Relationships    Social connections     Talks on phone: None     Gets together: None     Attends Islam service: None     Active member of club or organization: None Attends meetings of clubs or organizations: None     Relationship status: None    Intimate partner violence     Fear of current or ex partner: None     Emotionally abused: None     Physically abused: None     Forced sexual activity: None   Other Topics Concern    None   Social History Narrative    None        PHYSICAL EXAMINATION      BP (!) 147/72   Pulse 87   Temp 98.3 °F (36.8 °C) (Oral)   Resp 17   Ht 5' 3\" (1.6 m)   Wt 143 lb 4.3 oz (65 kg) Comment: from 4/17/20  SpO2 93%   BMI 25.38 kg/m²   This is a well-nourished patient in no acute distress  Awake and alert with severe aphasia. Able to say 1 or 2 words. Pupils equal round reactive to light. Visual fields full. External ocular movements intact. Right facial droop present. Tongue midline. Motor exam shows right hemiparesis. Left side normal.  Coordination impaired on the right side. Unable to ambulate. No carotid bruit. No neck stiffness.   DATA :  LABS:  General Labs:    CBC:   Lab Results   Component Value Date    WBC 12.4 04/22/2020    RBC 4.50 04/22/2020    HGB 12.8 04/22/2020    HCT 38.9 04/22/2020    MCV 86.3 04/22/2020    MCH 28.5 04/22/2020    MCHC 33.0 04/22/2020    RDW 14.1 04/22/2020     04/22/2020    MPV 9.0 04/22/2020     BMP:    Lab Results   Component Value Date     04/21/2020    K 3.9 04/21/2020    CL 97 04/21/2020    CO2 25 04/21/2020    BUN 35 04/21/2020    LABALBU 5.0 04/17/2020    CREATININE 1.0 04/21/2020    CALCIUM 9.4 04/21/2020    GFRAA >60 04/21/2020    LABGLOM 53 04/21/2020    GLUCOSE 136 04/21/2020     RADIOLOGY REVIEW:  I have reviewed radiology image(s) and reports(s) of: MRI brain      IMPRESSION :  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 Katty Kahn  Continue dual antiplatelet therapy  Continue treatment for hypertension but avoid significant hypotension          Please note a portion of this chart was generated using dragon dictation software. Although every effort was made to ensure the accuracy of this automated transcription, some errors in transcription may have occurred.          Stephanie Garcia M.D.

## 2020-04-22 NOTE — PROGRESS NOTES
Occupational Therapy  Facility/Department: [facility] ACUTE REHAB UNIT  Daily Treatment Note  NAME: Michoacano Gray  : 1934  MRN: [id number]    Date of Service: 2020    Discharge Recommendations:  24 hour supervision or assist, S Level 3  OT Equipment Recommendations  Other: continue to assess    Assessment   Performance deficits / Impairments: Decreased functional mobility ; Decreased ADL status; Decreased cognition;Decreased safe awareness;Decreased endurance;Decreased vision/visual deficit; Decreased balance;Decreased high-level IADLs  Assessment: Patient presents with the above deficits impacting occupational performance and functioning below baseline level. Recommend skilled OT to address deficits and promote functional independence. Significant hemiparesis and expressive and receptive aphasia   Treatment Diagnosis: Decreased functional mobility, ADL status, decreased safety, decreased cognition, decreased balance and endurance associated with CVA  Prognosis: Fair;Good  OT Education: OT Role;Plan of Care;ADL Adaptive Strategies;Transfer Training;Orientation  Patient Education: Pt did not verbalize independent understanding of education taught during session-would benefit from continued reinforcement  Barriers to Learning: cognition, aphasia   REQUIRES OT FOLLOW UP: Yes  Activity Tolerance  Activity Tolerance: Treatment limited secondary to decreased cognition;Patient limited by fatigue  Safety Devices  Safety Devices in place: Yes  Type of devices: Bed alarm in place; Left in bed;Call light within reach         Patient Diagnosis(es): CVA     has a past medical history of Arthritis, CAD (coronary artery disease), GERD (gastroesophageal reflux disease), Hx of blood clots, Hyperlipidemia, and Hypertension. has a past surgical history that includes Cholecystectomy; Appendectomy; Varicose vein surgery;  Endoscopy, colon, diagnostic; eye surgery; joint replacement; and Breast surgery. Restrictions  Restrictions/Precautions  Restrictions/Precautions: Fall Risk(High risk per [employer])  Required Braces or Orthoses?: No  Position Activity Restriction  Other position/activity restrictions: Michoacano Gray is a 80 y.o. female who presented to the emergency department today for evaluation for right leg weakness. The patient has a history of hypertension and hyperlipidemia. To ARU 4/20/20  Subjective   General  Chart Reviewed: Yes  Patient assessed for rehabilitation services?: Yes  Additional Pertinent Hx: HTN, HLD, CAD, arthritis  Response to previous treatment: Patient with no complaints from previous session  Family / Caregiver Present: No  Diagnosis: CVA  Subjective  Subjective: in bed on arrival - alert and eating banana - limited verbalizations during session, occasional yes/no but does not always seem accurate, occasionally grimaces as if in pain during mobility nella with movement of L knee and R arm, but unable to verbalize or point to area of pain       Orientation  Orientation  Overall Orientation Status: (unable to assess further due to aphasia )  Orientation Level: Oriented to person  Objective    ADL  Grooming:  Moderate assistance(mod for combing hair, mod brushing teeth, set up washing face)  LE Dressing: Dependent/Total(from supine in bed to change brief and [facility] pants )  Toileting: Dependent/Total(pt soiled with stool and small amount of urine while changing in bed prior to getting up, incontinent of bowel/bladder during session and returned to bed to change )        Standing Balance  Time: 1-2 minutes x 2  Activity: max A of 1/mod A of another - this therapist providing assist for upright posture due to foward flexed posture in standing and therapist also providing support and assist of L UE   Bed mobility  Rolling to Left: Moderate assistance  Rolling to Right: Moderate assistance  Supine to Sit: 2 Person assistance(mod A Of 2 )  Sit to Supine: 2 Person

## 2020-04-22 NOTE — PROGRESS NOTES
ACUTE REHAB UNIT  SPEECH/LANGUAGE PATHOLOGY      [x] Daily  [] Weekly Care Conference Note  [] Discharge    Patient:Hanna Pedroza     :1934  [facility]:[id number]  Room #: RQP-4916/5224-20   Rehab Dx/Hx: Acute ischemic stroke [facility]) [I63.9]  Date of Admit: 2020      Precautions: falls and aspirations  Home situation: Pt lives at home with her . Pt was independent prior to admission   ST Dx: Expressive and receptive aphasia with apraxia component; Cognitive deficits; Dysphagia     Daily Treatment Info:   Date: 2020   Tx session 1 Tx session 2   Pain Apparent pain when moving right arm. MD aware  No signs of pain or discomfort    Subjective     Pt in bed. Engaged in treatment tasks but easily distracted. Improved responses to automatic speech. Pt in bed for session. Very distracted and had difficulty maintaining attention. Pt had lunch meal present but was not initiating eating. Objective:  Goals     Pt will tolerate recommended diet and regular solids with no overt s/s of aspiration, noticeable fatigue or significant pocketing. Pt tolerated pills whole one a time with water. Required assistance for placement of pills in mouth and cues to follow with a drink. No overt s/s of aspiration were observed. Pt consumed thin liquid via cup. No observed s/s of aspiration. Reduced coordination with feeding and swallow initiation    Pt required max cues for eating, minimal responses to verbal commands for eating, required complete or tactile assistance. Provided education to RN re need for feeding assistance with all meals. Pt tolerated all presented consistencies without observed s/s of aspiration. Reduced coordination with feeding and swallow initiation. No upgraded consistencies were attempted during session. Pt will improve comprehension of basic commands and yes/no questions to 70% accuracy via graded tasks.  Basic commands    - 40% accuracy     Object recognition f/2   - 0%   - did not initiate responses, likely to have motor planning/initiation component that impacted accuracy  Basic commands (body parts)   - 40% accuracy   - became distracted and did respond to remaining     Object recognition during functional eating task   - mod-max cues   - pt attempting to drink from applesauce and pudding cup x3   - IDing utensils and meal items f/2, no responses elicited      Pt will complete basic expressive language tasks (sentence completion, naming, automatics) to 70% accuracy with min cues  Picture naming (foods)   - 25%   - mod cues for 75%    Confrontation naming (items on lunch tray)   - 20%    Pt will communicate basic wants/ needs and personal information utilizing multi-modalities with < mod cues. Personal information   - Verbalized: first name, husbands name, son, and daughter,   - Unable to generate her home address and grandchildren names     Verbalized orientation responses accurately  - hospital, MD name, month and year    Personal information   - required mod-max cues to elicit verbal response of her  and son's name   - required f/2 to verbalize her daughters name   - no response to name of her granddaughter     Preferences while eating   - made indication of preferences by grabbing for drink items   - pt did not respond to yes/no questions re preferenced items      Other areas targeted:     Education:   Provided education re rationale for treatment. Pt with limited comprehension  Pt's son and granddaughter present prior to session. Provided education re aphasia, current diet recommendations and overall plan of care. Encouraged to bring in family pictures to use for therapy and to continue to communicate with pt.       Safety Devices: [x] Call light within reach  [] Chair alarm activated  [x] Bed alarm activated  [] Other: [x] Call light within reach  [] Chair alarm activated  [x] Bed alarm activated  [] Other:      Assessment:   Speech Therapy Diagnosis  Cognitive

## 2020-04-22 NOTE — PROGRESS NOTES
Physical Therapy  Facility/Department: [facility] ACUTE REHAB UNIT  Daily Treatment Note  NAME: Hanh Islas  : 1934  MRN: [id number]    Date of Service: 2020    Discharge Recommendations:  Continue to assess pending progress, Patient would benefit from continued therapy after discharge, S Level 3   PT Equipment Recommendations  Other: Has W/c and RW at home    Assessment   Body structures, Functions, Activity limitations: Decreased functional mobility ; Decreased ROM; Decreased strength;Decreased ADL status; Decreased safe awareness;Decreased cognition;Decreased endurance;Decreased sensation;Decreased balance;Decreased fine motor control;Decreased coordination;Decreased posture  Assessment: Pt with above deficits who is functioning below baseline. She appears to have decreased midline awareness and mild pushing LUE which can be corrected with CVC. Pt would benefit from skilled PT to Improve functional independence. Treatment Diagnosis: RLE weakness, impaired balance, ambulation deficits  Prognosis: Fair  PT Education: [facility];Transfer Training  Patient Education: : Transfers, midline alignment. Pt need reinforcement  Barriers to Learning: Cognition,communication  REQUIRES PT FOLLOW UP: Yes  Activity Tolerance  Activity Tolerance: Patient limited by endurance     Patient Diagnosis(es): Stroke   has a past medical history of Arthritis, CAD (coronary artery disease), GERD (gastroesophageal reflux disease), Hx of blood clots, Hyperlipidemia, and Hypertension. has a past surgical history that includes Cholecystectomy; Appendectomy; Varicose vein surgery; Endoscopy, colon, diagnostic; eye surgery; joint replacement; and Breast surgery. Restrictions  Restrictions/Precautions  Restrictions/Precautions: Fall Risk  Required Braces or Orthoses?: No  Position Activity Restriction  Other position/activity restrictions:  Hanh Islas is a 80 y.o. female who presented to the  parallel bars with Mod A 2  Long term goals  Time Frame for Long term goals : 3 weeks  Long term goal 1: Pt will be mod I for bed mobility  Long term goal 2: Pt will transfer bed to chair with CGA  Long term goal 3: Pt will propel w/c 48' with Mod I  Long term goal 4: Pt will ambulate 22' with AAD and CGA  Long term goal 5: Pt will ascend/descend ramp with Min A  Patient Goals   Patient goals : None stated    Plan    Plan  Times per week: 60 minutes on 5 days/week  Plan weeks: 3-4 weeks  Current Treatment Recommendations: Strengthening, ROM, Balance Training, Functional Mobility Training, Transfer Training, Endurance Training, Wheelchair Mobility Training, Gait Training, Stair training, Neuromuscular Re-education, Pain Management, Safety Education & Training, Home Exercise Program, Patient/Caregiver Education & Training, Equipment Evaluation, Education, & procurement, Modalities, Positioning  Safety Devices  Type of devices: Left in bed, Telesitter in use, Bed alarm in place, Call light within reach, Nurse notified     Therapy Time   Individual Concurrent Group Co-treatment   Time In       0830   Time Out       0930   Minutes       60        Timed code treatment minutes: 60    Total treatment minutes: Jacklyn 86 PT 1838,  C/NDT  Alen Poe, PT

## 2020-04-23 LAB
ANION GAP SERPL CALCULATED.3IONS-SCNC: 11 MMOL/L (ref 3–16)
BUN BLDV-MCNC: 29 MG/DL (ref 7–20)
CALCIUM SERPL-MCNC: 9.8 MG/DL (ref 8.3–10.6)
CHLORIDE BLD-SCNC: 99 MMOL/L (ref 99–110)
CHOLESTEROL, TOTAL: 108 MG/DL (ref 0–199)
CO2: 26 MMOL/L (ref 21–32)
CREAT SERPL-MCNC: 0.6 MG/DL (ref 0.6–1.2)
ESTIMATED AVERAGE GLUCOSE: 105.4 MG/DL
GFR AFRICAN AMERICAN: >60
GFR NON-AFRICAN AMERICAN: >60
GLUCOSE BLD-MCNC: 100 MG/DL (ref 70–99)
HBA1C MFR BLD: 5.3 %
HCT VFR BLD CALC: 40 % (ref 36–48)
HDLC SERPL-MCNC: 44 MG/DL (ref 40–60)
HEMOGLOBIN: 13 G/DL (ref 12–16)
LDL CHOLESTEROL CALCULATED: 54 MG/DL
MCH RBC QN AUTO: 29 PG (ref 26–34)
MCHC RBC AUTO-ENTMCNC: 32.6 G/DL (ref 31–36)
MCV RBC AUTO: 88.9 FL (ref 80–100)
PDW BLD-RTO: 14.4 % (ref 12.4–15.4)
PLATELET # BLD: 284 K/UL (ref 135–450)
PMV BLD AUTO: 8.7 FL (ref 5–10.5)
POTASSIUM SERPL-SCNC: 4.5 MMOL/L (ref 3.5–5.1)
RBC # BLD: 4.49 M/UL (ref 4–5.2)
SODIUM BLD-SCNC: 136 MMOL/L (ref 136–145)
TRIGL SERPL-MCNC: 51 MG/DL (ref 0–150)
VLDLC SERPL CALC-MCNC: 10 MG/DL
WBC # BLD: 8.8 K/UL (ref 4–11)

## 2020-04-23 PROCEDURE — 6370000000 HC RX 637 (ALT 250 FOR IP): Performed by: PHYSICAL MEDICINE & REHABILITATION

## 2020-04-23 PROCEDURE — 97535 SELF CARE MNGMENT TRAINING: CPT

## 2020-04-23 PROCEDURE — 51701 INSERT BLADDER CATHETER: CPT

## 2020-04-23 PROCEDURE — 80048 BASIC METABOLIC PNL TOTAL CA: CPT

## 2020-04-23 PROCEDURE — 51798 US URINE CAPACITY MEASURE: CPT

## 2020-04-23 PROCEDURE — 97530 THERAPEUTIC ACTIVITIES: CPT

## 2020-04-23 PROCEDURE — 85027 COMPLETE CBC AUTOMATED: CPT

## 2020-04-23 PROCEDURE — 80061 LIPID PANEL: CPT

## 2020-04-23 PROCEDURE — 1280000000 HC REHAB R&B

## 2020-04-23 PROCEDURE — 92526 ORAL FUNCTION THERAPY: CPT

## 2020-04-23 PROCEDURE — 6360000002 HC RX W HCPCS: Performed by: PHYSICAL MEDICINE & REHABILITATION

## 2020-04-23 PROCEDURE — 83036 HEMOGLOBIN GLYCOSYLATED A1C: CPT

## 2020-04-23 PROCEDURE — 36415 COLL VENOUS BLD VENIPUNCTURE: CPT

## 2020-04-23 PROCEDURE — 92507 TX SP LANG VOICE COMM INDIV: CPT

## 2020-04-23 RX ORDER — LOSARTAN POTASSIUM 100 MG/1
100 TABLET ORAL DAILY
Status: DISCONTINUED | OUTPATIENT
Start: 2020-04-23 | End: 2020-05-20 | Stop reason: HOSPADM

## 2020-04-23 RX ADMIN — FAMOTIDINE 20 MG: 20 TABLET ORAL at 20:23

## 2020-04-23 RX ADMIN — CLOPIDOGREL 75 MG: 75 TABLET, FILM COATED ORAL at 09:36

## 2020-04-23 RX ADMIN — LOSARTAN POTASSIUM 100 MG: 100 TABLET, FILM COATED ORAL at 09:37

## 2020-04-23 RX ADMIN — ENOXAPARIN SODIUM 40 MG: 40 INJECTION SUBCUTANEOUS at 09:35

## 2020-04-23 RX ADMIN — ASPIRIN 81 MG: 81 TABLET, COATED ORAL at 09:36

## 2020-04-23 RX ADMIN — AMLODIPINE BESYLATE 10 MG: 5 TABLET ORAL at 09:36

## 2020-04-23 RX ADMIN — CALCIUM 500 MG: 500 TABLET ORAL at 09:36

## 2020-04-23 RX ADMIN — DESMOPRESSIN ACETATE 40 MG: 0.2 TABLET ORAL at 20:23

## 2020-04-23 RX ADMIN — FAMOTIDINE 20 MG: 20 TABLET ORAL at 09:36

## 2020-04-23 ASSESSMENT — PAIN SCALES - GENERAL: PAINLEVEL_OUTOF10: 0

## 2020-04-23 NOTE — PROGRESS NOTES
Physical Therapy  Facility/Department: [facility] ACUTE REHAB UNIT  Daily Treatment Note  NAME: Caryl Fontenot  : 1934  MRN: [id number]    Date of Service: 2020    Discharge Recommendations:  Continue to assess pending progress, Patient would benefit from continued therapy after discharge, S Level 3   PT Equipment Recommendations  Other: Has W/c and RW at home; additional needs to be addressed based on progress    Assessment   Body structures, Functions, Activity limitations: Decreased functional mobility ; Decreased ROM; Decreased strength;Decreased ADL status; Decreased safe awareness;Decreased cognition;Decreased endurance;Decreased sensation;Decreased balance;Decreased fine motor control;Decreased coordination;Decreased posture  Assessment: Pt remains with above deficits. Pt demonstrates improved midline orientation in sitting. Pt appeared to tolerate movement with less discomfort x 4 extremities. Pt would continue to benefit from skilled PT. Treatment Diagnosis: RLE weakness, impaired balance, ambulation deficits  Prognosis: Fair  PT Education: [facility];Transfer Training  Patient Education:  & : Transfers, midline alignment. Pt need reinforcement  Barriers to Learning: Cognition,communication  REQUIRES PT FOLLOW UP: Yes  Activity Tolerance  Activity Tolerance: Patient Tolerated treatment well     Patient Diagnosis(es): Strokes   has a past medical history of Arthritis, CAD (coronary artery disease), GERD (gastroesophageal reflux disease), Hx of blood clots, Hyperlipidemia, and Hypertension. has a past surgical history that includes Cholecystectomy; Appendectomy; Varicose vein surgery; Endoscopy, colon, diagnostic; eye surgery; joint replacement; and Breast surgery.     Restrictions  Restrictions/Precautions  Restrictions/Precautions: Fall Risk(High risk per [employer])  Required Braces or Orthoses?: No  Position Activity Restriction  Other position/activity restrictions: term goal 3: Pt will propel w/c 48' with Mod I  Long term goal 4: Pt will ambulate 22' with AAD and CGA  Long term goal 5: Pt will ascend/descend ramp with Min A  Patient Goals   Patient goals : None stated    Plan    Plan  Times per week: 60 minutes on 5 days/week  Plan weeks: 3-4 weeks  Current Treatment Recommendations: Strengthening, ROM, Balance Training, Functional Mobility Training, Transfer Training, Endurance Training, Wheelchair Mobility Training, Gait Training, Stair training, Neuromuscular Re-education, Pain Management, Safety Education & Training, Home Exercise Program, Patient/Caregiver Education & Training, Equipment Evaluation, Education, & procurement, Modalities, Positioning  Safety Devices  Type of devices: Chair alarm in place, Call light within reach, Telesitter in use     Therapy Time   Individual Concurrent Group Co-treatment   Time In       0830   Time Out       0930   Minutes       [id number] [facility] ,  C/NDT  Toña Bowser, PT

## 2020-04-23 NOTE — PLAN OF CARE
Problem: Falls - Risk of:  Goal: Will remain free from falls  Description: Will remain free from falls  4/23/2020 1049 by Carlos Alberto Story RN  Outcome: Ongoing  Note: Pt remains free from falls. Safety precautions in place. Bed in lowest position, bed/chair wheels locked, call light with in reach, bed/chair alarm on, fall risk wrist band on, SAFE outside of doorway. Will continue to monitor.

## 2020-04-23 NOTE — PROGRESS NOTES
Occupational Therapy  Facility/Department: [facility] ACUTE REHAB UNIT  Daily Treatment Note  NAME: Óscar Patel  : 1934  MRN: [id number]    Date of Service: 2020    Discharge Recommendations:  24 hour supervision or assist, S Level 3  OT Equipment Recommendations  Other: continue to assess    Assessment   Performance deficits / Impairments: Decreased functional mobility ; Decreased ADL status; Decreased cognition;Decreased safe awareness;Decreased endurance;Decreased vision/visual deficit; Decreased balance;Decreased high-level IADLs  Assessment: Patient presents with the above deficits impacting occupational performance and functioning below baseline level. Recommend skilled OT to address deficits and promote functional independence. Significant hemiparesis and expressive and receptive aphasia   Treatment Diagnosis: Decreased functional mobility, ADL status, decreased safety, decreased cognition, decreased balance and endurance associated with CVA  Prognosis: Fair;Good  OT Education: OT Role;Plan of Care;ADL Adaptive Strategies;Transfer Training;Orientation  Patient Education: Pt did not verbalize independent understanding of education taught during session- would benefit from continued reinforcement  Barriers to Learning: cognition, aphasia   REQUIRES OT FOLLOW UP: Yes  Activity Tolerance  Activity Tolerance: Treatment limited secondary to decreased cognition;Patient limited by fatigue;Patient limited by pain  Safety Devices  Safety Devices in place: Yes  Type of devices: All fall risk precautions in place;Call light within reach; Chair alarm in place;Gait belt;Patient at risk for falls; Left in chair;Nurse notified(Pt left in w/c with LEs positioned on leg rests; nurse notified)  Restraints  Initially in place: No         Patient Diagnosis(es): CVA     has a past medical history of Arthritis, CAD (coronary artery disease), GERD (gastroesophageal reflux disease), Hx of blood clots, Hyperlipidemia, and Hypertension. has a past surgical history that includes Cholecystectomy; Appendectomy; Varicose vein surgery; Endoscopy, colon, diagnostic; eye surgery; joint replacement; and Breast surgery. Restrictions  Restrictions/Precautions  Restrictions/Precautions: Fall Risk(High risk per [employer])  Required Braces or Orthoses?: No  Position Activity Restriction  Other position/activity restrictions: Govind Rangel is a 80 y.o. female who presented to the emergency department today for evaluation for right leg weakness. The patient has a history of hypertension and hyperlipidemia. To ARU 4/20/20  Subjective   General  Chart Reviewed: Yes  Patient assessed for rehabilitation services?: Yes  Additional Pertinent Hx: HTN, HLD, CAD, arthritis  Response to previous treatment: Patient with no complaints from previous session  Family / Caregiver Present: No  Diagnosis: CVA  Subjective  Subjective: Supine in bed upon arrival. Made limited verbalizations and ~3 multi-word statements throughout session; inconsistent response to verbalized instructions and comments. Some facial grimaces and increased muscle tension during mobility of LLE and RUE; pt unable to verbalize pain. Orientation  Orientation  Overall Orientation Status: (unable to assess further due to aphasia )  Orientation Level: Oriented to person  Objective    ADL  Grooming: Setup; Moderate assistance;Verbal cueing(set up with tactile and verbal cuing to wash face seated EOB; set up to brush teeth seated at sink; mod A to comb hair)  UE Bathing: Maximum assistance;Dependent/Total(Max A for bathing seated EOB with verbal and tactile cues to initiate and assistance for LUE bathing, min A of PT for balance )  UE Dressing: Maximum assistance(CGA for sitting balance EOB; mod A to doff shirt; min A to thread LUE and max A to thread RUE)  LE Dressing: Dependent/Total(max A to thread pants seated on toilet; dependent to pull over hips)  Toileting: simple ADL tasks, limited verbalization, appears to  have expressive and receptive aphasia      Perception  Overall Perceptual Status: Impaired  Unilateral Attention: Cues to attend to right side of body;Cues to maintain midline in sitting  Initiation: Cues to initiate tasks              Plan   Plan  Times per week: 60 minutes 5 days per week   Current Treatment Recommendations: Functional Mobility Training, Endurance Training, Safety Education & Training, Self-Care / ADL, Home Management Training    Goals  Short term goals  Time Frame for Short term goals: 1 week   Short term goal 1: mod A of 1 functional transfers- dependent (max A of 1, mod A of 1) 4/23  Short term goal 2: mod A of 1 UB bathing/dressing- max A 4/23  Short term goal 3: max A of 1 LB bathing/dressing- max A to dependent 4/23  Long term goals  Time Frame for Long term goals : 3 weeks   Long term goal 1: CGA functional transfers   Long term goal 2: min A UB bathing/dressing, set up grooming  Long term goal 3: mod A LB bathing/dressing and toileting   Long term goal 4: w/c mobility for ADL and IADL tasks mod I   Long term goal 5: min A simple IADLs from w/c level   Patient Goals   Patient goals : does not state       Therapy Time   Individual Concurrent Group Co-treatment   Time In       0830   Time Out       0930   Minutes       60   Timed Code Treatment Minutes: 60 Minutes   Total Treatment Minutes: [address] [facility]

## 2020-04-24 PROCEDURE — 87086 URINE CULTURE/COLONY COUNT: CPT

## 2020-04-24 PROCEDURE — 97535 SELF CARE MNGMENT TRAINING: CPT

## 2020-04-24 PROCEDURE — 87186 SC STD MICRODIL/AGAR DIL: CPT

## 2020-04-24 PROCEDURE — 81001 URINALYSIS AUTO W/SCOPE: CPT

## 2020-04-24 PROCEDURE — 97112 NEUROMUSCULAR REEDUCATION: CPT

## 2020-04-24 PROCEDURE — 97530 THERAPEUTIC ACTIVITIES: CPT

## 2020-04-24 PROCEDURE — 51701 INSERT BLADDER CATHETER: CPT

## 2020-04-24 PROCEDURE — 1280000000 HC REHAB R&B

## 2020-04-24 PROCEDURE — 92526 ORAL FUNCTION THERAPY: CPT

## 2020-04-24 PROCEDURE — 92507 TX SP LANG VOICE COMM INDIV: CPT

## 2020-04-24 PROCEDURE — 87077 CULTURE AEROBIC IDENTIFY: CPT

## 2020-04-24 PROCEDURE — 51798 US URINE CAPACITY MEASURE: CPT

## 2020-04-24 PROCEDURE — 6370000000 HC RX 637 (ALT 250 FOR IP): Performed by: PHYSICAL MEDICINE & REHABILITATION

## 2020-04-24 PROCEDURE — 6360000002 HC RX W HCPCS: Performed by: PHYSICAL MEDICINE & REHABILITATION

## 2020-04-24 RX ADMIN — CLOPIDOGREL 75 MG: 75 TABLET, FILM COATED ORAL at 08:18

## 2020-04-24 RX ADMIN — FAMOTIDINE 20 MG: 20 TABLET ORAL at 23:53

## 2020-04-24 RX ADMIN — DESMOPRESSIN ACETATE 40 MG: 0.2 TABLET ORAL at 23:53

## 2020-04-24 RX ADMIN — LOSARTAN POTASSIUM 100 MG: 100 TABLET, FILM COATED ORAL at 08:18

## 2020-04-24 RX ADMIN — CALCIUM 500 MG: 500 TABLET ORAL at 08:18

## 2020-04-24 RX ADMIN — FAMOTIDINE 20 MG: 20 TABLET ORAL at 08:18

## 2020-04-24 RX ADMIN — ENOXAPARIN SODIUM 40 MG: 40 INJECTION SUBCUTANEOUS at 09:36

## 2020-04-24 RX ADMIN — AMLODIPINE BESYLATE 10 MG: 5 TABLET ORAL at 08:17

## 2020-04-24 RX ADMIN — ASPIRIN 81 MG: 81 TABLET, COATED ORAL at 08:18

## 2020-04-24 ASSESSMENT — PAIN SCALES - WONG BAKER
WONGBAKER_NUMERICALRESPONSE: 0
WONGBAKER_NUMERICALRESPONSE: 2
WONGBAKER_NUMERICALRESPONSE: 0

## 2020-04-24 ASSESSMENT — PAIN SCALES - GENERAL
PAINLEVEL_OUTOF10: 0

## 2020-04-24 NOTE — PROGRESS NOTES
Occupational Therapy  Facility/Department: [facility] ACUTE REHAB UNIT  Daily Treatment Note  NAME: Ruth Ann Go  : 1934  MRN: [id number]    Date of Service: 2020    Discharge Recommendations:  24 hour supervision or assist, S Level 3  OT Equipment Recommendations  Other: continue to assess    Assessment   Performance deficits / Impairments: Decreased functional mobility ; Decreased ADL status; Decreased cognition;Decreased safe awareness;Decreased endurance;Decreased vision/visual deficit; Decreased balance;Decreased high-level IADLs  Assessment: Patient presents with the above deficits impacting occupational performance and functioning below baseline level. Recommend skilled OT to address deficits and promote functional independence. Significant hemiparesis and expressive and receptive aphasia   Treatment Diagnosis: Decreased functional mobility, ADL status, decreased safety, decreased cognition, decreased balance and endurance associated with CVA  Prognosis: Fair;Good  OT Education: OT Role;Plan of Care;ADL Adaptive Strategies;Transfer Training;Orientation  Patient Education: Pt did not verbalize independent understanding of education taught during session- would benefit from continued reinforcement  Barriers to Learning: cognition, aphasia   REQUIRES OT FOLLOW UP: Yes  Activity Tolerance  Activity Tolerance: Treatment limited secondary to decreased cognition;Patient limited by fatigue;Patient limited by pain  Safety Devices  Safety Devices in place: Yes  Type of devices: Call light within reach; Chair alarm in place; Left in chair  Restraints  Initially in place: No         Patient Diagnosis(es): CVA     has a past medical history of Arthritis, CAD (coronary artery disease), GERD (gastroesophageal reflux disease), Hx of blood clots, Hyperlipidemia, and Hypertension. has a past surgical history that includes Cholecystectomy; Appendectomy; Varicose vein surgery;  Endoscopy, colon, diagnostic; eye seated rest breaks completed protraction/retraction and elevation/dep for R shoulder mobility and biofreeze applied to R shoulder for comfort   Toilet Transfers  Toilet - Technique: Stand pivot  Equipment Used: Extra wide bedside commode(atop toilet )  Toilet Transfer: Dependent/Total;2 Person assistance  Toilet Transfers Comments: max A of 1/mod A of 1 with BSC atop toilet      Transfers  Stand Pivot Transfers: Dependent/Total;2 Person assistance(max A of 1/mod A Of 1 )      Cognition  Arousal/Alertness: Inconsistent responses to stimuli  Following Commands: Follows one step commands with increased time; Follows one step commands with repetition  Attention Span: Difficulty attending to directions; Attends with cues to redirect; Difficulty dividing attention  Safety Judgement: Decreased awareness of need for safety  Problem Solving: Decreased awareness of errors;Assistance required to identify errors made;Assistance required to correct errors made  Insights: Decreased awareness of deficits  Initiation: Requires cues for some  Sequencing: Requires cues for some  Cognition Comment: pt follows about 75% of in context commands, demonstrates apraxia during simple ADL tasks, increasing verbalizations daily        Type of ROM/Therapeutic Exercise  Comment: seated stepper x 5 minutes L UE, B LEs with R UE in WB position at hip height, assist to keep R hip in neutral and min A to propel machine                    Plan   Plan  Times per week: 60 minutes 5 days per week   Times per day: Daily  Current Treatment Recommendations: Functional Mobility Training, Endurance Training, Safety Education & Training, Self-Care / ADL, Home Management Training       Goals  Short term goals  Time Frame for Short term goals: 1 week   Short term goal 1: mod A of 1 functional transfers- dependent (max A of 1, mod A of 1) 4/23  Short term goal 2: mod A of 1 UB bathing/dressing- max A 4/23  Short term goal 3: max A of 1 LB bathing/dressing- max A to dependent 4/23  Long term goals  Time Frame for Long term goals : 3 weeks   Long term goal 1: CGA functional transfers   Long term goal 2: min A UB bathing/dressing, set up grooming  Long term goal 3: mod A LB bathing/dressing and toileting   Long term goal 4: w/c mobility for ADL and IADL tasks mod I   Long term goal 5: min A simple IADLs from w/c level   Patient Goals   Patient goals : does not state       Therapy Time   Individual Concurrent Group Co-treatment   Time In       0830   Time Out       0934   Minutes       64   Timed Code Treatment Minutes: 64 Minutes   Total minutes 64    Leo Rodas [place], OT   Leo Briceño OTR/KENNY [id number], 4/24/2020, 12:25 PM

## 2020-04-24 NOTE — PLAN OF CARE
Problem: Falls - Risk of:  Goal: Will remain free from falls  Description: Will remain free from falls  Outcome: Ongoing     Problem: Falls - Risk of:  Goal: Absence of physical injury  Description: Absence of physical injury  Outcome: Ongoing     Problem: Pain:  Goal: Pain level will decrease  Description: Pain level will decrease  Outcome: Ongoing     Problem: Pain:  Goal: Control of acute pain  Description: Control of acute pain  Outcome: Ongoing     Problem: Pain:  Goal: Control of chronic pain  Description: Control of chronic pain  Outcome: Ongoing     Problem: IP BLADDER/VOIDING  Goal: LTG - patient will complete bladder elimination  Outcome: Ongoing     Problem: IP BLADDER/VOIDING  Goal: LTG - patient will achieve acceptable level of continence  Outcome: Ongoing     Problem: IP BLADDER/VOIDING  Goal: STG - Patient demonstrates no accidents  Outcome: Ongoing     Problem: IP BOWEL ELIMINATION  Goal: STG - patient will be accident free  Outcome: Ongoing     Problem: IP BOWEL ELIMINATION  Goal: STG - patient maintains skin integrity  Outcome: Ongoing     Problem: NUTRITION  Goal: Patient maintains adequate hydration  Outcome: Ongoing     Problem: SAFETY  Goal: LTG - Patient will demonstrate safety requirements appropriate to situation/environment  Outcome: Ongoing     Problem: SAFETY  Goal: LTG - patient will utilize safety techniques  Outcome: Ongoing     Problem: SAFETY  Goal: STG - Patient uses call light consistently to request assistance with transfers  Outcome: Ongoing     Problem: SKIN INTEGRITY  Goal: LTG - Patient will be free from infection  Outcome: Ongoing     Problem: SKIN INTEGRITY  Goal: LTG - patient will maintain/improve skin integrity through proper skin care techniques  Outcome: Ongoing     Problem: SKIN INTEGRITY  Goal: LTG - Patient will demonstrate appropriate pressure relief techniques  Outcome: Ongoing     Problem: SKIN INTEGRITY  Goal: LTG - patient will demonstrate appropriate skin

## 2020-04-24 NOTE — PROGRESS NOTES
Wm Giron  4/24/2020  [id number]    Chief Complaint: Acute ischemic stroke ([facility] [address]    Subjective:   No overnight events. No current complaints. Participating well in therapy. ROS: No CP, SOB, dyspnea    Objective:  Patient Vitals for the past 24 hrs:   BP Temp Temp src Pulse Resp SpO2   04/24/20 0816 (!) 143/80 98.2 °F (36.8 °C) Oral 100 20 --   04/23/20 2015 134/69 97.4 °F (36.3 °C) Oral 85 16 95 %     Gen: No distress, pleasant. Resting in bed  HEENT: Normocephalic, atraumatic. CV: Regular rate and rhythm. No MRG   Resp: No respiratory distress. CTAB   Abd: Soft, nontender   Ext: No edema. Neuro: Alert, oriented, aphasia, appropriately interactive. RUE 2/5 with exception of . RLE 0/5 HF 2/5 KE/DF/PF    Laboratory data: Available via EMR. Therapy progress:  PT  Position Activity Restriction  Other position/activity restrictions: Wm Giron is a 80 y.o. female who presented to the emergency department today for evaluation for right leg weakness. The patient has a history of hypertension and hyperlipidemia. To ARU 4/20/20  Objective     Sit to Stand: 2 Person Assistance  Stand to sit: 2 Person Assistance  Bed to Chair: 2 Person Assistance, Dependent/Total     OT  PT Equipment Recommendations  Equipment Needed: No  Other: Has W/c and RW at home; additional needs to be addressed based on progress  Toilet - Technique: Stand pivot  Equipment Used: Standard toilet  Assessment        SLP  Current Diet : Regular  Current Liquid Diet : Thin  Diet Solids Recommendation: Dysphagia Soft and Bite-Sized (Dysphagia III)  Liquid Consistency Recommendation: Thin    Body mass index is 25.38 kg/m².     Assessment:  Patient Active Problem List   Diagnosis    Acute cerebral infarction ([facility] [address]    Monoparesis of leg ([address]    Expressive aphasia    Essential hypertension    Cerebrovascular disease    ASHD (arteriosclerotic heart disease)    Ataxia    Acute ischemic stroke ([facility] [address]       Plan:   Acute ischemic

## 2020-04-24 NOTE — PROGRESS NOTES
ACUTE REHAB UNIT  SPEECH/LANGUAGE PATHOLOGY      [x] Daily  [] Weekly Care Conference Note  [] Discharge    Patient:Hanna Kim     :1934  VFK:[id number]  Room #: DZR-4784/6870-32   Rehab Dx/Hx: Acute ischemic stroke [facility]) [I63.9]  Date of Admit: 2020      Precautions: falls and aspirations  Home situation: Pt lives at home with her . Pt was independent prior to admission   ST Dx: Expressive and receptive aphasia with apraxia component; Cognitive deficits; Dysphagia     Daily Treatment Info:   Date: 2020   Tx session 1 Tx session 2   Pain No overt signs of pain during session  Reported back pain. Repositioned back to bed after session which appeared to relieve. Subjective     Pt in chair for session. Remained alert but required cues to maintain attention, very easily distracted    Pt up in chair for session. Independently initiated request to go to the bathroom. PCA and RN assisted. Pt's granddaughter called for update. Returned call with pt present. Objective:  Goals     Pt will tolerate recommended diet and regular solids with no overt s/s of aspiration, noticeable fatigue or significant pocketing. Pt tolerated pills, one at a time, with liquid without observed s/s of aspiration. Intermittent belching noted. While s/s of aspiration are not evident across consistencies, behavior and cognition increase risk for dysphagia therefore precautions should be implemented during feeding. Goal not targeted this session. Pt will improve comprehension of basic commands and yes/no questions to 70% accuracy via graded tasks.  Object ID   - 40% (2/5)   - quick initial responses to first two (clock, drink) then did not respond to remaining attempts     Single step automatic commands (while taking meds)   - pt required consistent (max) verbal cues for each presentation of medication (open mouth, tongue out)    Follow single step commands  - targeted indirectly with commands for movements during transfer   - required mod-max cues, simplification, repetition and modeling   - pt easily distracted, reported \"you're phone is ringing in the middle of standing\" which impacted her ability to follow single step directions     Family member ID in pictures   - 88% accuracy      Pt will complete basic expressive language tasks (sentence completion, naming, automatics) to 70% accuracy with min cues  Confrontation naming (family members in pictures)   - 40% accuracy   - errors were semantic, often naming other relatives or perseverative on previous names provided   - minimal response to corrections/cues   - using 2 word phrases during naming     Automatics   - no response   - provided tactile and verbal cues  - visual cues are limited given the extent of PPE being used by SLP in compliance with hospital protocol    Automatics   - counted to 10 Independently    - happy birthday; hummed tune x1 Independently; mimic'd lyrics with approximately 25% accuracy     Naming family members in pictures  - 50% (2/4) accuracy     Naming objects   - 2/2 (bed and toilet paper)    Pt will communicate basic wants/ needs and personal information utilizing multi-modalities with < mod cues. Read time on clock Independently and accurately. Pt Independently verbally communicated toileting needs and back pain. Produced clear phrases (3-5 words) during transferring to communicate pain. Unable to communicate more complex needs (finished toileting, requests to return to bed vs chair) despite max cues. Other areas targeted:     Education:   Education was provided regarding rationale for tasks completed, no evidence of learning. Pt requires ongoing education. Education was provided regarding rationale for tasks completed, no evidence of learning. Pt requires ongoing education.     Safety Devices: [x] Call light within reach  [] Chair alarm activated  [x] Bed alarm activated  [] Other: [x] Call light within

## 2020-04-24 NOTE — PLAN OF CARE
Problem: Falls - Risk of:  Goal: Will remain free from falls  Description: Will remain free from falls  4/24/2020 1515 by Mushtaq Snell RN  Outcome: Ongoing  Note: Fall risk precautions in place, call light in reach, bed in lowest position, 2/2 bed rails up, bed wheels locked, bed side table within reach, bed alarm on, will continue to monitor. Problem: Falls - Risk of:  Goal: Absence of physical injury  Description: Absence of physical injury  4/24/2020 1515 by Mushtaq Snell RN  Outcome: Ongoing  Note: Pt is free of injury. No injury noted. Fall precautions in place. Call light within reach. Will monitor. Problem: Pain:  Goal: Pain level will decrease  Description: Pain level will decrease  4/24/2020 1515 by Mushtaq Snell RN  Outcome: Ongoing  Note: No complaints of pain this shift. Will continue to assess and monitor. Problem: IP BLADDER/VOIDING  Goal: LTG - patient will complete bladder elimination  4/24/2020 1515 by Mushtaq Snell RN  Outcome: Ongoing  Note: Pt straight catheterized. Bladder scanned for 432 and straight catheterized for 400 ml. No PVR. Urine was tea colored, malodolorous, with blood clots throughout. Problem: IP BLADDER/VOIDING  Goal: LTG - patient will achieve acceptable level of continence  4/24/2020 1515 by Mushtaq Snell RN  Outcome: Ongoing  Note: Pt is experiencing urinary retention. Problem: IP BOWEL ELIMINATION  Goal: STG - patient will be accident free  4/24/2020 1515 by Mushtaq Snell RN  Outcome: Ongoing  Note: No accidents noted this shift. Problem: SAFETY  Goal: STG - Patient uses call light consistently to request assistance with transfers  4/24/2020 1515 by Mushtaq Snell RN  Outcome: Ongoing  Note: Pt needs to be rounded on every hour. She does not use the call light appropriately to request assistance.       Problem: SKIN INTEGRITY  Goal: LTG - Patient will be free from infection  4/24/2020 1515

## 2020-04-24 NOTE — PROGRESS NOTES
Physical Therapy  Facility/Department: [facility] ACUTE REHAB UNIT  Daily Treatment Note  NAME: Govind Rangel  : 1934  MRN: [id number]    Date of Service: 2020    Discharge Recommendations:  Continue to assess pending progress, Patient would benefit from continued therapy after discharge, S Level 3   PT Equipment Recommendations  Equipment Needed: No  Other: Has W/c and RW at home; additional needs to be addressed based on progress    Assessment   Body structures, Functions, Activity limitations: Decreased functional mobility ; Decreased ROM; Decreased strength;Decreased ADL status; Decreased safe awareness;Decreased cognition;Decreased endurance;Decreased sensation;Decreased balance;Decreased fine motor control;Decreased coordination;Decreased posture  Assessment: Pt demonstrates improved ability to follow one step commands; demonstrates decreased pain with movement and intermittent activation of RUE. Pt would benefit from continued skilled PT to improve function. Treatment Diagnosis: RLE weakness, impaired balance, ambulation deficits  Prognosis: Fair  Patient Education:  & : Transfers, midline alignment. Pt need reinforcement. : Transfers. Pt able to follow one step commands, needs reinforcement. Barriers to Learning: Cognition,communication  REQUIRES PT FOLLOW UP: Yes  Activity Tolerance  Activity Tolerance: Patient Tolerated treatment well     Patient Diagnosis(es): Stroke   has a past medical history of Arthritis, CAD (coronary artery disease), GERD (gastroesophageal reflux disease), Hx of blood clots, Hyperlipidemia, and Hypertension. has a past surgical history that includes Cholecystectomy; Appendectomy; Varicose vein surgery; Endoscopy, colon, diagnostic; eye surgery; joint replacement; and Breast surgery.     Restrictions  Restrictions/Precautions  Restrictions/Precautions: Fall Risk  Required Braces or Orthoses?: No  Position Activity Restriction  Other position/activity

## 2020-04-25 LAB
BACTERIA: ABNORMAL /HPF
BILIRUBIN URINE: NEGATIVE
BLOOD, URINE: ABNORMAL
CLARITY: ABNORMAL
COLOR: YELLOW
COMMENT UA: ABNORMAL
EPITHELIAL CELLS, UA: 0 /HPF (ref 0–5)
GLUCOSE URINE: NEGATIVE MG/DL
HYALINE CASTS: 12 /LPF (ref 0–8)
KETONES, URINE: NEGATIVE MG/DL
LEUKOCYTE ESTERASE, URINE: ABNORMAL
MICROSCOPIC EXAMINATION: YES
NITRITE, URINE: NEGATIVE
PH UA: 8.5 (ref 5–8)
PROTEIN UA: >=300 MG/DL
RBC UA: 21 /HPF (ref 0–4)
SPECIFIC GRAVITY UA: 1.01 (ref 1–1.03)
URINE REFLEX TO CULTURE: YES
URINE TYPE: ABNORMAL
UROBILINOGEN, URINE: 0.2 E.U./DL
WBC UA: 94 /HPF (ref 0–5)

## 2020-04-25 PROCEDURE — 97112 NEUROMUSCULAR REEDUCATION: CPT

## 2020-04-25 PROCEDURE — 1280000000 HC REHAB R&B

## 2020-04-25 PROCEDURE — 97530 THERAPEUTIC ACTIVITIES: CPT

## 2020-04-25 PROCEDURE — 97130 THER IVNTJ EA ADDL 15 MIN: CPT

## 2020-04-25 PROCEDURE — 51798 US URINE CAPACITY MEASURE: CPT

## 2020-04-25 PROCEDURE — 92526 ORAL FUNCTION THERAPY: CPT

## 2020-04-25 PROCEDURE — 92507 TX SP LANG VOICE COMM INDIV: CPT

## 2020-04-25 PROCEDURE — 51701 INSERT BLADDER CATHETER: CPT

## 2020-04-25 PROCEDURE — 97535 SELF CARE MNGMENT TRAINING: CPT

## 2020-04-25 PROCEDURE — 6370000000 HC RX 637 (ALT 250 FOR IP): Performed by: PHYSICAL MEDICINE & REHABILITATION

## 2020-04-25 PROCEDURE — 6360000002 HC RX W HCPCS: Performed by: PHYSICAL MEDICINE & REHABILITATION

## 2020-04-25 PROCEDURE — 97129 THER IVNTJ 1ST 15 MIN: CPT

## 2020-04-25 RX ORDER — CIPROFLOXACIN 500 MG/1
500 TABLET, FILM COATED ORAL EVERY 12 HOURS SCHEDULED
Status: COMPLETED | OUTPATIENT
Start: 2020-04-25 | End: 2020-05-01

## 2020-04-25 RX ADMIN — CIPROFLOXACIN 500 MG: 500 TABLET, FILM COATED ORAL at 21:03

## 2020-04-25 RX ADMIN — TRAMADOL HYDROCHLORIDE 50 MG: 50 TABLET, FILM COATED ORAL at 12:03

## 2020-04-25 RX ADMIN — CIPROFLOXACIN 500 MG: 500 TABLET, FILM COATED ORAL at 12:03

## 2020-04-25 RX ADMIN — ASPIRIN 81 MG: 81 TABLET, COATED ORAL at 08:28

## 2020-04-25 RX ADMIN — FAMOTIDINE 20 MG: 20 TABLET ORAL at 08:28

## 2020-04-25 RX ADMIN — CALCIUM 500 MG: 500 TABLET ORAL at 08:28

## 2020-04-25 RX ADMIN — AMLODIPINE BESYLATE 10 MG: 5 TABLET ORAL at 08:28

## 2020-04-25 RX ADMIN — FAMOTIDINE 20 MG: 20 TABLET ORAL at 21:03

## 2020-04-25 RX ADMIN — LOSARTAN POTASSIUM 100 MG: 100 TABLET, FILM COATED ORAL at 08:28

## 2020-04-25 RX ADMIN — ENOXAPARIN SODIUM 40 MG: 40 INJECTION SUBCUTANEOUS at 08:28

## 2020-04-25 RX ADMIN — CLOPIDOGREL 75 MG: 75 TABLET, FILM COATED ORAL at 08:28

## 2020-04-25 RX ADMIN — DESMOPRESSIN ACETATE 40 MG: 0.2 TABLET ORAL at 21:03

## 2020-04-25 ASSESSMENT — PAIN SCALES - WONG BAKER
WONGBAKER_NUMERICALRESPONSE: 2
WONGBAKER_NUMERICALRESPONSE: 0
WONGBAKER_NUMERICALRESPONSE: 2

## 2020-04-25 ASSESSMENT — PAIN SCALES - GENERAL
PAINLEVEL_OUTOF10: 0

## 2020-04-25 NOTE — PROGRESS NOTES
size (7) as pt reporting \"I don't want anymore\"   Limited to thin liquids:   Multiple presentations tolerated without over clinical s/s post swallow   Pt will improve comprehension of basic commands and yes/no questions to 70% accuracy via graded tasks. [place] FC ? Using objects/food tray items: >70%    [place] Y/N? Regarding biographical information and immediation environment: frequent repetition warranted    One step commands witout visual/tactile cues: <30%    One step commands (re-positioning; 1 step simple motor commands): max visual cues and moderate tactile cues    Auditory word/ body scheme: <25%    Pt appears [place] also  Follow single step commands  - one step psychomotor commands: max tactile/physical cues  -sustained one step motor commands for 5 task trials after max conditioning with 5 task trials: max cues    Family member ID in pictures   - <50% accuracy       Overall pt was less engaged in this session as compared to earlier am session. Lethargy noted   Pt will complete basic expressive language tasks (sentence completion, naming, automatics) to 70% accuracy with min cues  Provided full name; and partial     Confrontation naming ([employer] Items)   - CFN on confrontation: 70%  CFN (items on tray): 70%  CFN using FC?  For both [place] and food tray items and items in room): >85%    Verbal object function: when item was presented: 70% at 1-4 word utterance level    Verbal reading words ([employer] material utilized): >85% of trials    Automatics   - greetings intact  - serial sequences like counting: with external cues for initiation and for sustained     Automatics   - serial sequences: consistent with earlier session; external cues for initiation of and sustained     CFN  Naming family members in pictures  - <25% accuracy despite phonemic cue      Naming objects/pt's items (cell phone; word search book; etc)   - smaller sample size as compared to earlier session; and pt as not easily engaged

## 2020-04-25 NOTE — PROGRESS NOTES
U/A obtained after patient was bladder scanned and straight cathed. Pt. hads not voided on her own. Patient tolerated procedure well.

## 2020-04-25 NOTE — PLAN OF CARE
Problem: Falls - Risk of:  Goal: Will remain free from falls  Description: Will remain free from falls  4/25/2020 0423 by Denita Man RN  Outcome: Ongoing     Problem: Falls - Risk of:  Goal: Absence of physical injury  Description: Absence of physical injury  4/25/2020 0423 by Denita Man RN  Outcome: Ongoing     Problem: Pain:  Goal: Pain level will decrease  Description: Pain level will decrease  4/25/2020 0423 by Denita Man RN  Outcome: Ongoing     Problem: Pain:  Goal: Control of acute pain  Description: Control of acute pain  4/25/2020 0423 by Denita Man RN  Outcome: Ongoing     Problem: Pain:  Goal: Control of chronic pain  Description: Control of chronic pain  4/25/2020 0423 by Denita Man RN  Outcome: Ongoing     Problem: IP BLADDER/VOIDING  Goal: LTG - patient will complete bladder elimination  4/25/2020 0423 by Denita Man RN  Outcome: Ongoing     Problem: IP BLADDER/VOIDING  Goal: LTG - patient will achieve acceptable level of continence  4/25/2020 0423 by Denita Man RN  Outcome: Ongoing     Problem: IP BLADDER/VOIDING  Goal: STG - patient demonstrates self-cath technique using clean technique and care of the catheter  4/25/2020 0423 by Denita Man RN  Outcome: Ongoing     Problem: IP BLADDER/VOIDING  Goal: STG - Patient demonstrates no accidents  4/25/2020 0423 by Denita Man RN  Outcome: Ongoing     Problem: IP BOWEL ELIMINATION  Goal: STG - patient will be accident free  4/25/2020 0423 by Denita Man RN  Outcome: Ongoing     Problem: IP BOWEL ELIMINATION  Goal: STG - patient maintains skin integrity  4/25/2020 0423 by Denita Man RN  Outcome: Ongoing     Problem: NUTRITION  Goal: Patient maintains adequate hydration  4/25/2020 0423 by Denita Man RN  Outcome: Ongoing     Problem: SAFETY  Goal: LTG - patient will adhere to hip precautions during ADL's and transfers  4/25/2020 0423 by Denita Man RN  Outcome: Ongoing     Problem: SAFETY  Goal: LTG - Patient will demonstrate safety requirements appropriate to situation/environment  4/25/2020 0423 by Veto Mckinney RN  Outcome: Ongoing     Problem: SAFETY  Goal: LTG - patient will utilize safety techniques  4/25/2020 0423 by Veto Mckinney RN  Outcome: Ongoing     Problem: SAFETY  Goal: STG - Patient uses call light consistently to request assistance with transfers  4/25/2020 0423 by Veto Mckinney RN  Outcome: Ongoing     Problem: SKIN INTEGRITY  Goal: LTG - Patient will be free from infection  4/25/2020 0423 by Veto Mckinney RN  Outcome: Ongoing     Problem: SKIN INTEGRITY  Goal: LTG - patient will maintain/improve skin integrity through proper skin care techniques  4/25/2020 0423 by Veto Mckinney RN  Outcome: Ongoing     Problem: SKIN INTEGRITY  Goal: LTG - patient will maintain/improve skin integrity through proper skin care techniques  4/24/2020 1515 by Kamla Hall RN  Outcome: Ongoing     Problem: SKIN INTEGRITY  Goal: LTG - Patient will demonstrate appropriate pressure relief techniques  4/25/2020 0423 by Veto Mckinney RN  Outcome: Ongoing     Problem: SKIN INTEGRITY  Goal: LTG - patient will demonstrate appropriate skin care techniques  4/25/2020 0423 by Veto Mckinney RN  Outcome: Ongoing     Problem: SKIN INTEGRITY  Goal: STG - patient will maintain good skin integrity  4/25/2020 0423 by Veto Mckinney RN  Outcome: Ongoing     Problem: PAIN  Goal: LTG - Patient will manage pain with the appropriate technique/Intervention  4/25/2020 0423 by Veto Mckinney RN  Outcome: Ongoing     Problem: PAIN  Goal: STG - Patient will verbalize an acceptable level of pain  4/25/2020 0423 by Veto Mckinney RN  Outcome: Ongoing     Problem: PAIN  Goal: STG - patient verbalizes a reduction in pain level  4/25/2020 0423 by Veto Mckinney RN  Outcome: Ongoing       Problem: Nutrition  Goal: Optimal nutrition therapy  4/25/2020 0423 by Veto Mckinney RN  Outcome:

## 2020-04-25 NOTE — PROGRESS NOTES
bed to chair with Mod A 2  Short term goal 3: Pt will tolerate sitting EOB for functional task x 5 minutes with CGA  Short term goal 4: Pt will  parallel bars with Mod A 2  Long term goals  Time Frame for Long term goals : 3 weeks  Long term goal 1: Pt will be mod I for bed mobility  Long term goal 2: Pt will transfer bed to chair with CGA  Long term goal 3: Pt will propel w/c 48' with Mod I  Long term goal 4: Pt will ambulate 22' with AAD and CGA  Long term goal 5: Pt will ascend/descend ramp with Min A  Patient Goals   Patient goals : None stated    Plan    Plan  Times per week: 60 minutes on 5 days/week  Plan weeks: 3-4 weeks  Current Treatment Recommendations: Strengthening, ROM, Balance Training, Functional Mobility Training, Transfer Training, Endurance Training, Wheelchair Mobility Training, Gait Training, Stair training, Neuromuscular Re-education, Pain Management, Safety Education & Training, Home Exercise Program, Patient/Caregiver Education & Training, Equipment Evaluation, Education, & procurement, Modalities, Positioning  Safety Devices  Type of devices: Chair alarm in place, Call light within reach, Telesitter in use, All fall risk precautions in place, Patient at risk for falls, Gait belt, Nurse notified  Restraints  Initially in place: No     Therapy Time   Individual Concurrent Group Co-treatment   Time In       1045   Time Out       1155   Minutes       70        Timed Code Treatment Minutes:   70    Total Treatment Minutes:  84 [facility], 310 [facility], 316 [facility]

## 2020-04-26 PROCEDURE — 1280000000 HC REHAB R&B

## 2020-04-26 PROCEDURE — 51798 US URINE CAPACITY MEASURE: CPT

## 2020-04-26 PROCEDURE — 6370000000 HC RX 637 (ALT 250 FOR IP): Performed by: PHYSICAL MEDICINE & REHABILITATION

## 2020-04-26 PROCEDURE — 51701 INSERT BLADDER CATHETER: CPT

## 2020-04-26 PROCEDURE — 6360000002 HC RX W HCPCS: Performed by: PHYSICAL MEDICINE & REHABILITATION

## 2020-04-26 RX ADMIN — ENOXAPARIN SODIUM 40 MG: 40 INJECTION SUBCUTANEOUS at 08:58

## 2020-04-26 RX ADMIN — ASPIRIN 81 MG: 81 TABLET, COATED ORAL at 08:58

## 2020-04-26 RX ADMIN — TRAMADOL HYDROCHLORIDE 50 MG: 50 TABLET, FILM COATED ORAL at 13:49

## 2020-04-26 RX ADMIN — CALCIUM 500 MG: 500 TABLET ORAL at 08:58

## 2020-04-26 RX ADMIN — DESMOPRESSIN ACETATE 40 MG: 0.2 TABLET ORAL at 20:15

## 2020-04-26 RX ADMIN — FAMOTIDINE 20 MG: 20 TABLET ORAL at 20:15

## 2020-04-26 RX ADMIN — CLOPIDOGREL 75 MG: 75 TABLET, FILM COATED ORAL at 08:58

## 2020-04-26 RX ADMIN — FAMOTIDINE 20 MG: 20 TABLET ORAL at 08:58

## 2020-04-26 RX ADMIN — LOSARTAN POTASSIUM 100 MG: 100 TABLET, FILM COATED ORAL at 08:58

## 2020-04-26 RX ADMIN — AMLODIPINE BESYLATE 10 MG: 5 TABLET ORAL at 08:58

## 2020-04-26 RX ADMIN — CIPROFLOXACIN 500 MG: 500 TABLET, FILM COATED ORAL at 20:15

## 2020-04-26 RX ADMIN — CIPROFLOXACIN 500 MG: 500 TABLET, FILM COATED ORAL at 08:58

## 2020-04-26 ASSESSMENT — PAIN SCALES - GENERAL
PAINLEVEL_OUTOF10: 7
PAINLEVEL_OUTOF10: 0

## 2020-04-26 ASSESSMENT — PAIN SCALES - WONG BAKER: WONGBAKER_NUMERICALRESPONSE: 0

## 2020-04-26 NOTE — PLAN OF CARE
Problem: Falls - Risk of:  Goal: Will remain free from falls  Description: Will remain free from falls  Outcome: Ongoing     Problem: Pain:  Description: Pain management should include both nonpharmacologic and pharmacologic interventions.   Goal: Pain level will decrease  Description: Pain level will decrease  Outcome: Ongoing     Problem: IP BLADDER/VOIDING  Goal: LTG - patient will complete bladder elimination  Outcome: Ongoing     Problem: IP BOWEL ELIMINATION  Goal: STG - patient will be accident free  Outcome: Ongoing     Problem: SAFETY  Goal: LTG - patient will adhere to hip precautions during ADL's and transfers  Outcome: Ongoing     Problem: SKIN INTEGRITY  Goal: LTG - Patient will be free from infection  Outcome: Ongoing

## 2020-04-26 NOTE — PROGRESS NOTES
The pt slept most of the day. Ate less than 10% of meal.  Drank ensure 50% x2 with frequent cues. The pt was alert and spoke clearly few times. Had no initiation.

## 2020-04-27 LAB
ANION GAP SERPL CALCULATED.3IONS-SCNC: 13 MMOL/L (ref 3–16)
BUN BLDV-MCNC: 38 MG/DL (ref 7–20)
CALCIUM SERPL-MCNC: 10 MG/DL (ref 8.3–10.6)
CHLORIDE BLD-SCNC: 104 MMOL/L (ref 99–110)
CO2: 27 MMOL/L (ref 21–32)
CREAT SERPL-MCNC: 0.7 MG/DL (ref 0.6–1.2)
GFR AFRICAN AMERICAN: >60
GFR NON-AFRICAN AMERICAN: >60
GLUCOSE BLD-MCNC: 129 MG/DL (ref 70–99)
HCT VFR BLD CALC: 41.6 % (ref 36–48)
HEMOGLOBIN: 13.6 G/DL (ref 12–16)
MCH RBC QN AUTO: 28.5 PG (ref 26–34)
MCHC RBC AUTO-ENTMCNC: 32.7 G/DL (ref 31–36)
MCV RBC AUTO: 87.1 FL (ref 80–100)
ORGANISM: ABNORMAL
PDW BLD-RTO: 14.1 % (ref 12.4–15.4)
PLATELET # BLD: 491 K/UL (ref 135–450)
PMV BLD AUTO: 8.2 FL (ref 5–10.5)
POTASSIUM SERPL-SCNC: 4.3 MMOL/L (ref 3.5–5.1)
RBC # BLD: 4.78 M/UL (ref 4–5.2)
SODIUM BLD-SCNC: 144 MMOL/L (ref 136–145)
URINE CULTURE, ROUTINE: ABNORMAL
WBC # BLD: 12.8 K/UL (ref 4–11)

## 2020-04-27 PROCEDURE — 6360000002 HC RX W HCPCS: Performed by: PHYSICAL MEDICINE & REHABILITATION

## 2020-04-27 PROCEDURE — 97129 THER IVNTJ 1ST 15 MIN: CPT

## 2020-04-27 PROCEDURE — 85027 COMPLETE CBC AUTOMATED: CPT

## 2020-04-27 PROCEDURE — 80048 BASIC METABOLIC PNL TOTAL CA: CPT

## 2020-04-27 PROCEDURE — 36415 COLL VENOUS BLD VENIPUNCTURE: CPT

## 2020-04-27 PROCEDURE — 97530 THERAPEUTIC ACTIVITIES: CPT

## 2020-04-27 PROCEDURE — 1280000000 HC REHAB R&B

## 2020-04-27 PROCEDURE — 51701 INSERT BLADDER CATHETER: CPT

## 2020-04-27 PROCEDURE — 97535 SELF CARE MNGMENT TRAINING: CPT

## 2020-04-27 PROCEDURE — 92507 TX SP LANG VOICE COMM INDIV: CPT

## 2020-04-27 PROCEDURE — 51798 US URINE CAPACITY MEASURE: CPT

## 2020-04-27 PROCEDURE — 6370000000 HC RX 637 (ALT 250 FOR IP): Performed by: PHYSICAL MEDICINE & REHABILITATION

## 2020-04-27 RX ADMIN — CIPROFLOXACIN 500 MG: 500 TABLET, FILM COATED ORAL at 08:49

## 2020-04-27 RX ADMIN — LOSARTAN POTASSIUM 100 MG: 100 TABLET, FILM COATED ORAL at 08:49

## 2020-04-27 RX ADMIN — FAMOTIDINE 20 MG: 20 TABLET ORAL at 08:49

## 2020-04-27 RX ADMIN — FAMOTIDINE 20 MG: 20 TABLET ORAL at 22:22

## 2020-04-27 RX ADMIN — CIPROFLOXACIN 500 MG: 500 TABLET, FILM COATED ORAL at 22:22

## 2020-04-27 RX ADMIN — ENOXAPARIN SODIUM 40 MG: 40 INJECTION SUBCUTANEOUS at 08:51

## 2020-04-27 RX ADMIN — DESMOPRESSIN ACETATE 40 MG: 0.2 TABLET ORAL at 22:22

## 2020-04-27 RX ADMIN — AMLODIPINE BESYLATE 10 MG: 5 TABLET ORAL at 08:49

## 2020-04-27 RX ADMIN — ASPIRIN 81 MG: 81 TABLET, COATED ORAL at 08:50

## 2020-04-27 RX ADMIN — CALCIUM 500 MG: 500 TABLET ORAL at 08:49

## 2020-04-27 RX ADMIN — CLOPIDOGREL 75 MG: 75 TABLET, FILM COATED ORAL at 08:49

## 2020-04-27 ASSESSMENT — PAIN SCALES - WONG BAKER: WONGBAKER_NUMERICALRESPONSE: 4

## 2020-04-27 ASSESSMENT — PAIN SCALES - GENERAL: PAINLEVEL_OUTOF10: 0

## 2020-04-27 NOTE — PROGRESS NOTES
aspiration, noticeable fatigue or significant pocketing. Pt tolerated thin liquid and soft solid of breakfast meal without observed s/s of aspiration. Consumed majority of banana during session without signs of fatigue. RN report from weekend indicated minimal PO intake. Thins via cup in isolation- pt initiated drinking multiple times throughout session producing occasional throat clears    Suspect due to oral apraxia and incoordination of the swallow. Suspect pt generally tolerating diet. Tolerated large drinks of slightly thick-thin liquid (Ensure) via bottle without observed s/s of aspiration. Pt will improve comprehension of basic commands and yes/no questions to 70% accuracy via graded tasks. Object ID f/2    - no responses elicited  Goal not targeted this session.     Picture ID f/2   - 0%   - no responses elicited x3 attempts and max verbal cues      Pt will complete basic expressive language tasks (sentence completion, naming, automatics) to 70% accuracy with min cues  Confrontation naming (family members)   - 80%, immediate family members, increased difficulty with extended family \"I don't know who that is\", in reference to familiar grandchildren     Confrontation naming (objects/food items)   - 100% (4/4)   - improved significantly from previous attempts    Confrontational naming (pictured food objects)   - 4/12 Independently   - improved to 7/12 (58%) with carrier phrase and semantic cues   - No response elicited x 4 despite repetition     Phrase Completion  - 7/21 Independently   - improved to 10/21 min cues (phonemic cue/ phonemic placement cue), 12/21 (57%) mod cues (phonemic cues/ phonemic placement + gestures)    Confrontation naming (pictured objects/foods)   - minimal response elicited, unintelligible / paraphasias   - pt less engaged than in previous session with naming task     Automatic   - counting to 10, no response elicited    Pt will communicate basic wants/ needs and errors during evaluation. Pt was limited in responses to yes/no questions and when following basic commands. Pt made few intelligible verbalizations in response to automatic questions. Paraphasias were evident during naming tasks. Current Diet Order: DIET DYSPHAGIA SOFT AND BITE-SIZED; Dietary Nutrition Supplements: Standard High Calorie Oral Supplement   Recommended Form of Meds: Whole with water(One at a time )  Compensatory Swallowing Strategies: Small bites/sips, Eat/Feed slowly, No straws, Upright as possible for all oral intake     Plan:     Frequency:  5days/week   60 minutes/day  Discharge Recommendations:   Barriers: Communication deficits; Awareness; Initiation   Discharge Recommendations:  [] Home independently  [] Home with assistance [x]  24 hour supervision  [] SNF [] Other:  Continued SLP Treatment:  [x] Yes [] No [] TBD based on progress while on ARU [] Vital Stim indicated [] Other:   Estimated discharge date: TBD      Type of Total Treatment Minutes   Session 1   Session 2 Session 3   Time In 0810 1030 1245   Time Out 0830 1100 1300   Timed Code Minutes   10    Individual Treatment Minutes  20 30 15   Co-Treatment Minutes       Group Treatment Minutes       Concurrent Treatment Minutes         TOTAL DAILY MINUTES:  65     Electronically Signed by     Session 1 & Via Maty Kee M.A. CCC-SLP S.PMaximiliano [id number]  Speech-Language Pathologist [phone]  4/27/2020 8:45 AM     Franklin GONZALES  CCC-SLP #[id number] 4/27/2020 2:33 PM  Speech-Language Pathologist

## 2020-04-27 NOTE — PROGRESS NOTES
Nutrition Assessment    Type and Reason for Visit: Reassess    Nutrition Recommendations:   1. Increase Ensure Enlive to TID  2. Provide assistance/encouragement with meals  3. Document all meal/supplement intake in flow sheets  4. Obtain updated body weight; CBW is from 4/17    Nutrition Assessment: Pt with expressive aphasia and delayed responses; information obtained from EMR. Pt continues to be nutritionally compromised as evidenced by PO intake less than 50% of meals. Note pt was fatigued and slept most of the day 4/26 and requires initiation from nursing to attempt PO intake. Pt is drinking about 50% of Ensure Enlive; will increase from BID to TID. Malnutrition Assessment:  · Malnutrition Status: At risk for malnutrition    Nutrition Risk Level: High    Nutrient Needs:  · Estimated Daily Total Kcal: 9293-4464  · Estimated Daily Protein (g): 68-83 grams  · Estimated Daily Total Fluid (ml/day): 1 mL per kcal     Nutrition Diagnosis:   · Problem: Inadequate oral intake  · Etiology: related to Insufficient energy/nutrient consumption     Signs and symptoms:  as evidenced by Intake 0-25%, Intake 25-50%    Objective Information:  · Nutrition-Focused Physical Findings: No edema noted. Expressive aphasia/delayed responses. Flaccid R extremities. -3.2 liters.    · Wound Type: None  · Current Nutrition Therapies:  · Oral Diet Orders: Dysphagia  Soft and Bite-Sized (Dysphagia 3)   · Oral Diet intake: 1-25%, 26-50%  · Oral Nutrition Supplement (ONS) Orders: Standard High Calorie Oral Supplement  · ONS intake: 26-50%  · Anthropometric Measures:  · Ht: 5' 3\" (160 cm)   · Current Body Wt: 143 lb (64.9 kg)(from 4/17)  · Ideal Body Wt: 115 lb (52.2 kg),   · BMI Classification: BMI 25.0 - 29.9 Overweight    Nutrition Interventions:   Continue current diet, Continue current ONS  Continued Inpatient Monitoring, Education Not Indicated    Nutrition Evaluation:   · Evaluation: Progressing toward goals   · Goals: Pt will consume at least 50% of meals and supplements     · Monitoring: Meal Intake, Supplement Intake, Diet Tolerance, Weight      Electronically signed by Joseph Aj RD, FREDY on 4/27/20 at 10:00 AM EDT    Contact Number: [phone]

## 2020-04-27 NOTE — PLAN OF CARE
Problem: SKIN INTEGRITY  Goal: LTG - patient will maintain/improve skin integrity through proper skin care techniques  4/27/2020 1439 by Sheela Copeland RN  Outcome: Ongoing  Note: No new skin issues identified.

## 2020-04-28 LAB
ANION GAP SERPL CALCULATED.3IONS-SCNC: 13 MMOL/L (ref 3–16)
BUN BLDV-MCNC: 39 MG/DL (ref 7–20)
CALCIUM SERPL-MCNC: 9.5 MG/DL (ref 8.3–10.6)
CHLORIDE BLD-SCNC: 105 MMOL/L (ref 99–110)
CO2: 26 MMOL/L (ref 21–32)
CREAT SERPL-MCNC: 0.7 MG/DL (ref 0.6–1.2)
GFR AFRICAN AMERICAN: >60
GFR NON-AFRICAN AMERICAN: >60
GLUCOSE BLD-MCNC: 127 MG/DL (ref 70–99)
HCT VFR BLD CALC: 39.8 % (ref 36–48)
HEMOGLOBIN: 13.1 G/DL (ref 12–16)
MCH RBC QN AUTO: 28.7 PG (ref 26–34)
MCHC RBC AUTO-ENTMCNC: 33 G/DL (ref 31–36)
MCV RBC AUTO: 86.9 FL (ref 80–100)
PDW BLD-RTO: 14.2 % (ref 12.4–15.4)
PLATELET # BLD: 459 K/UL (ref 135–450)
PMV BLD AUTO: 8.3 FL (ref 5–10.5)
POTASSIUM SERPL-SCNC: 4.2 MMOL/L (ref 3.5–5.1)
RBC # BLD: 4.57 M/UL (ref 4–5.2)
SODIUM BLD-SCNC: 144 MMOL/L (ref 136–145)
WBC # BLD: 11.7 K/UL (ref 4–11)

## 2020-04-28 PROCEDURE — 92507 TX SP LANG VOICE COMM INDIV: CPT

## 2020-04-28 PROCEDURE — 97530 THERAPEUTIC ACTIVITIES: CPT

## 2020-04-28 PROCEDURE — 51701 INSERT BLADDER CATHETER: CPT

## 2020-04-28 PROCEDURE — 6360000002 HC RX W HCPCS: Performed by: PHYSICAL MEDICINE & REHABILITATION

## 2020-04-28 PROCEDURE — 51798 US URINE CAPACITY MEASURE: CPT

## 2020-04-28 PROCEDURE — 97112 NEUROMUSCULAR REEDUCATION: CPT

## 2020-04-28 PROCEDURE — 97535 SELF CARE MNGMENT TRAINING: CPT

## 2020-04-28 PROCEDURE — 1280000000 HC REHAB R&B

## 2020-04-28 PROCEDURE — 92526 ORAL FUNCTION THERAPY: CPT

## 2020-04-28 PROCEDURE — 6370000000 HC RX 637 (ALT 250 FOR IP): Performed by: PHYSICAL MEDICINE & REHABILITATION

## 2020-04-28 PROCEDURE — 80048 BASIC METABOLIC PNL TOTAL CA: CPT

## 2020-04-28 PROCEDURE — 85027 COMPLETE CBC AUTOMATED: CPT

## 2020-04-28 RX ADMIN — FAMOTIDINE 20 MG: 20 TABLET ORAL at 21:07

## 2020-04-28 RX ADMIN — FAMOTIDINE 20 MG: 20 TABLET ORAL at 08:13

## 2020-04-28 RX ADMIN — TRAMADOL HYDROCHLORIDE 50 MG: 50 TABLET, FILM COATED ORAL at 21:07

## 2020-04-28 RX ADMIN — CLOPIDOGREL 75 MG: 75 TABLET, FILM COATED ORAL at 08:13

## 2020-04-28 RX ADMIN — LOSARTAN POTASSIUM 100 MG: 100 TABLET, FILM COATED ORAL at 08:13

## 2020-04-28 RX ADMIN — CIPROFLOXACIN 500 MG: 500 TABLET, FILM COATED ORAL at 08:13

## 2020-04-28 RX ADMIN — ENOXAPARIN SODIUM 40 MG: 40 INJECTION SUBCUTANEOUS at 08:12

## 2020-04-28 RX ADMIN — AMLODIPINE BESYLATE 10 MG: 5 TABLET ORAL at 08:13

## 2020-04-28 RX ADMIN — ASPIRIN 81 MG: 81 TABLET, COATED ORAL at 08:13

## 2020-04-28 RX ADMIN — DESMOPRESSIN ACETATE 40 MG: 0.2 TABLET ORAL at 21:07

## 2020-04-28 RX ADMIN — CIPROFLOXACIN 500 MG: 500 TABLET, FILM COATED ORAL at 21:07

## 2020-04-28 RX ADMIN — CALCIUM 500 MG: 500 TABLET ORAL at 08:13

## 2020-04-28 ASSESSMENT — PAIN SCALES - GENERAL: PAINLEVEL_OUTOF10: 5

## 2020-04-28 ASSESSMENT — PAIN DESCRIPTION - PAIN TYPE: TYPE: CHRONIC PAIN

## 2020-04-28 ASSESSMENT — PAIN DESCRIPTION - LOCATION: LOCATION: BACK

## 2020-04-28 NOTE — PROGRESS NOTES
Physical Therapy  Facility/Department: [facility] ACUTE REHAB UNIT  Daily Treatment Note  NAME: Yin Russo  : 1934  MRN: [id number]    Date of Service: 2020    Discharge Recommendations:  Patient would benefit from continued therapy after discharge, S Level 3, 24 hour supervision or assist   PT Equipment Recommendations  Other: Has W/c and RW at home; additional needs to be addressed based on progress    Assessment   Body structures, Functions, Activity limitations: Decreased functional mobility ; Decreased ROM; Decreased strength;Decreased ADL status; Decreased safe awareness;Decreased cognition;Decreased endurance;Decreased sensation;Decreased balance;Decreased fine motor control;Decreased coordination;Decreased posture  Assessment: Pt more interactive, following single step commands, participating in transfers as well as with verbal communication. Pt remains markedly limited by apraxia and would benefit from continued skilled PT to address above deficts. Treatment Diagnosis: RLE weakness, impaired balance, ambulation deficits  Prognosis: Fair  Patient Education:  & : Transfers, midline alignment. Pt need reinforcement.  - : Transfers. Pt able to follow one step commands, needs reinforcement. REQUIRES PT FOLLOW UP: Yes  Activity Tolerance  Activity Tolerance: Patient Tolerated treatment well     Patient Diagnosis(es): Stroke   has a past medical history of Arthritis, CAD (coronary artery disease), GERD (gastroesophageal reflux disease), Hx of blood clots, Hyperlipidemia, and Hypertension. has a past surgical history that includes Cholecystectomy; Appendectomy; Varicose vein surgery; Endoscopy, colon, diagnostic; eye surgery; joint replacement; and Breast surgery. Restrictions  Restrictions/Precautions  Restrictions/Precautions: Fall Risk  Required Braces or Orthoses?: No  Position Activity Restriction  Other position/activity restrictions:  Yin Russo is a 80 y.o. female who presented to the emergency department  for evaluation for right leg weakness. The patient has a history of hypertension and hyperlipidemia. To ARU 4/20/20  Subjective   General  Chart Reviewed: Yes  Additional Pertinent Hx: CAD, DVT, R TSA, HTN  Response To Previous Treatment: Patient with no complaints from previous session. Subjective  Subjective: Pt continues with single work and short phrase utterances. Pt denies knee pain and declines use of Biofreeze  General Comment  Comments: Pt up in recliner upon arrival.          Orientation  Orientation  Overall Orientation Status: (Difficult to fully assess due to communication deficit)  Cognition      Objective      Transfers  Sit to Stand: 2 Person Assistance(Mod A 2)  Stand to sit: 2 Person Assistance  Squat Pivot Transfers: 2 Person Assistance  Lateral Transfers: 2 Person Assistance  Comment: Pt able to assist with positioning for transfers; requries mod A 2 for all transfes this a.m. Comment: Co-treat for safety and to maximize function. Assisted with sponge bath and dressing from recliner; pt exhibits activation and purposeful use of RUE intermittently. Performed oral care at sink. Stand-pivot transfers x 4 throughout session requiring  Mod A 2 for all. Seated stepper x 6 minutes with PT and OT providing positioning assist throughout. Pt able to grasp handle with RUE when support given at elbow to position. Pt maintains RLE with intermittent cues at knee for alignment and heel to maintain positioning. Stood in parallel bars 2 x 60 seconds with Mod A 2; wt shifts heavily to R on 2nd attempt due in part to pushing with LUE. Performed seated writing task; demonstrating R hand grasp and improved activation with support and elbow. Returned to room and pt requested to be transferred recliner. Stand-pivot as above.   Positioned for comfort with Chair alarm activated, call light and needs in reach        Goals  Short term goals  Time Frame for Short term goals: 1 week  Short term goal 1: Pt will perform bed mobilty with Mod A 1  Short term goal 2: Pt will transfer bed to chair with Mod A 2  Short term goal 3: Pt will tolerate sitting EOB for functional task x 5 minutes with CGA  Short term goal 4: Pt will  parallel bars with Mod A 2  Long term goals  Time Frame for Long term goals : 3 weeks  Long term goal 1: Pt will be mod I for bed mobility  Long term goal 2: Pt will transfer bed to chair with CGA  Long term goal 3: Pt will propel w/c 48' with Mod I  Long term goal 4: Pt will ambulate 22' with AAD and CGA  Long term goal 5: Pt will ascend/descend ramp with Min A  Patient Goals   Patient goals : None stated    Plan    Plan  Times per week: 60 minutes on 5 days/week  Plan weeks: 3-4 weeks  Current Treatment Recommendations: Strengthening, ROM, Balance Training, Functional Mobility Training, Transfer Training, Endurance Training, Wheelchair Mobility Training, Gait Training, Stair training, Neuromuscular Re-education, Pain Management, Safety Education & Training, Home Exercise Program, Patient/Caregiver Education & Training, Equipment Evaluation, Education, & procurement, Modalities, Positioning  Safety Devices  Type of devices: Call light within reach, Left in chair, Telesitter in use, Chair alarm in place  Restraints  Initially in place: No     Therapy Time   Individual Concurrent Group Co-treatment   Time In       1000   Time Out       1100   Minutes       60         Timed code treatment minutes:  60    Total treatment minutes: [address] PT 0664,  C/NDT  Rachel Jones, PT

## 2020-04-28 NOTE — PROGRESS NOTES
and prewriting activities at table top using dry erase board and marker x 10 minutes - pt able to maintain  on dry erase marker with R hand, assist to support/unweight R UE at elbow to allow for greater activation of shoulder, pt able to move shoulder minimally in gravity assisted plain for activity - able to attend well to full visual plain during activity          Type of ROM/Therapeutic Exercise  Comment: seated stepper x 6 minutes B UEs and B LEs with min A          Plan   Plan  Times per week: 60 minutes 5 days per week   Times per day: Daily  Specific instructions for Next Treatment: continue with cotx   Current Treatment Recommendations: Functional Mobility Training, Endurance Training, Safety Education & Training, Self-Care / ADL, Home Management Training       Goals  Short term goals  Time Frame for Short term goals: 1 week   Short term goal 1: mod A of 1 functional transfers- dependent (max A of 1, mod A of 1) 4/23  Short term goal 2: mod A of 1 UB bathing/dressing- max A 4/23  Short term goal 3: max A of 1 LB bathing/dressing- max A to dependent 4/23  Long term goals  Time Frame for Long term goals : 3 weeks   Long term goal 1: CGA functional transfers   Long term goal 2: min A UB bathing/dressing, set up grooming  Long term goal 3: mod A LB bathing/dressing and toileting   Long term goal 4: w/c mobility for ADL and IADL tasks mod I   Long term goal 5: min A simple IADLs from w/c level   Patient Goals   Patient goals : does not state       Therapy Time   Individual Concurrent Group Co-treatment   Time In       1000   Time Out       1100   Minutes       60   Timed Code Treatment Minutes: 60 Minutes   Total minutes 60    LJ Peterson OTR/KENNY [id number], 4/28/2020, 11:51 AM

## 2020-04-28 NOTE — PLAN OF CARE
Problem: Pain:  Description: Pain management should include both nonpharmacologic and pharmacologic interventions. Goal: Control of acute pain  Description: Control of acute pain  4/27/2020 2213 by Melody Maynard RN  Outcome: Ongoing  4/27/2020 1439 by Boris Agustin RN  Outcome: Ongoing     Problem: Pain:  Description: Pain management should include both nonpharmacologic and pharmacologic interventions.   Goal: Control of chronic pain  Description: Control of chronic pain  4/27/2020 2213 by Melody Maynard RN  Outcome: Ongoing  4/27/2020 1439 by Boris Agustin RN  Outcome: Ongoing

## 2020-04-29 LAB
ANION GAP SERPL CALCULATED.3IONS-SCNC: 11 MMOL/L (ref 3–16)
BUN BLDV-MCNC: 39 MG/DL (ref 7–20)
CALCIUM SERPL-MCNC: 9.5 MG/DL (ref 8.3–10.6)
CHLORIDE BLD-SCNC: 102 MMOL/L (ref 99–110)
CO2: 28 MMOL/L (ref 21–32)
CREAT SERPL-MCNC: 0.6 MG/DL (ref 0.6–1.2)
GFR AFRICAN AMERICAN: >60
GFR NON-AFRICAN AMERICAN: >60
GLUCOSE BLD-MCNC: 109 MG/DL (ref 70–99)
HCT VFR BLD CALC: 39.5 % (ref 36–48)
HEMOGLOBIN: 13.1 G/DL (ref 12–16)
MCH RBC QN AUTO: 28.8 PG (ref 26–34)
MCHC RBC AUTO-ENTMCNC: 33.1 G/DL (ref 31–36)
MCV RBC AUTO: 87 FL (ref 80–100)
PDW BLD-RTO: 14.4 % (ref 12.4–15.4)
PLATELET # BLD: 466 K/UL (ref 135–450)
PMV BLD AUTO: 8.2 FL (ref 5–10.5)
POTASSIUM SERPL-SCNC: 4.7 MMOL/L (ref 3.5–5.1)
RBC # BLD: 4.55 M/UL (ref 4–5.2)
SODIUM BLD-SCNC: 141 MMOL/L (ref 136–145)
WBC # BLD: 10.4 K/UL (ref 4–11)

## 2020-04-29 PROCEDURE — 92526 ORAL FUNCTION THERAPY: CPT

## 2020-04-29 PROCEDURE — 1280000000 HC REHAB R&B

## 2020-04-29 PROCEDURE — 36415 COLL VENOUS BLD VENIPUNCTURE: CPT

## 2020-04-29 PROCEDURE — 92507 TX SP LANG VOICE COMM INDIV: CPT

## 2020-04-29 PROCEDURE — 85027 COMPLETE CBC AUTOMATED: CPT

## 2020-04-29 PROCEDURE — 97530 THERAPEUTIC ACTIVITIES: CPT

## 2020-04-29 PROCEDURE — 97112 NEUROMUSCULAR REEDUCATION: CPT

## 2020-04-29 PROCEDURE — 51701 INSERT BLADDER CATHETER: CPT

## 2020-04-29 PROCEDURE — 6370000000 HC RX 637 (ALT 250 FOR IP): Performed by: PHYSICAL MEDICINE & REHABILITATION

## 2020-04-29 PROCEDURE — 6360000002 HC RX W HCPCS: Performed by: PHYSICAL MEDICINE & REHABILITATION

## 2020-04-29 PROCEDURE — 80048 BASIC METABOLIC PNL TOTAL CA: CPT

## 2020-04-29 PROCEDURE — 51798 US URINE CAPACITY MEASURE: CPT

## 2020-04-29 PROCEDURE — 97535 SELF CARE MNGMENT TRAINING: CPT

## 2020-04-29 RX ORDER — METHYLPHENIDATE HYDROCHLORIDE 10 MG/1
5 TABLET ORAL ONCE
Status: COMPLETED | OUTPATIENT
Start: 2020-04-29 | End: 2020-04-29

## 2020-04-29 RX ORDER — METHYLPHENIDATE HYDROCHLORIDE 10 MG/1
5 TABLET ORAL
Status: DISCONTINUED | OUTPATIENT
Start: 2020-04-29 | End: 2020-05-07

## 2020-04-29 RX ADMIN — LOSARTAN POTASSIUM 100 MG: 100 TABLET, FILM COATED ORAL at 09:01

## 2020-04-29 RX ADMIN — CALCIUM 500 MG: 500 TABLET ORAL at 09:01

## 2020-04-29 RX ADMIN — METHYLPHENIDATE HYDROCHLORIDE 5 MG: 10 TABLET ORAL at 09:44

## 2020-04-29 RX ADMIN — ASPIRIN 81 MG: 81 TABLET, COATED ORAL at 09:01

## 2020-04-29 RX ADMIN — CIPROFLOXACIN 500 MG: 500 TABLET, FILM COATED ORAL at 20:16

## 2020-04-29 RX ADMIN — AMLODIPINE BESYLATE 10 MG: 5 TABLET ORAL at 09:01

## 2020-04-29 RX ADMIN — FAMOTIDINE 20 MG: 20 TABLET ORAL at 09:01

## 2020-04-29 RX ADMIN — FAMOTIDINE 20 MG: 20 TABLET ORAL at 20:16

## 2020-04-29 RX ADMIN — DESMOPRESSIN ACETATE 40 MG: 0.2 TABLET ORAL at 20:18

## 2020-04-29 RX ADMIN — CLOPIDOGREL 75 MG: 75 TABLET, FILM COATED ORAL at 09:02

## 2020-04-29 RX ADMIN — ACETAMINOPHEN 650 MG: 325 TABLET, FILM COATED ORAL at 17:17

## 2020-04-29 RX ADMIN — CIPROFLOXACIN 500 MG: 500 TABLET, FILM COATED ORAL at 09:01

## 2020-04-29 RX ADMIN — ACETAMINOPHEN 650 MG: 325 TABLET, FILM COATED ORAL at 09:01

## 2020-04-29 RX ADMIN — ENOXAPARIN SODIUM 40 MG: 40 INJECTION SUBCUTANEOUS at 09:01

## 2020-04-29 ASSESSMENT — PAIN SCALES - GENERAL
PAINLEVEL_OUTOF10: 3
PAINLEVEL_OUTOF10: 3

## 2020-04-29 NOTE — PROGRESS NOTES
Occupational Therapy  Facility/Department: [facility] ACUTE REHAB UNIT  Daily Treatment Note  NAME: Kingsley Aschoff  : 1934  MRN: [id number]    Date of Service: 2020    Discharge Recommendations:  24 hour supervision or assist, S Level 3  OT Equipment Recommendations  Other: continue to assess    Assessment   Performance deficits / Impairments: Decreased functional mobility ; Decreased ADL status; Decreased cognition;Decreased safe awareness;Decreased endurance;Decreased vision/visual deficit; Decreased balance;Decreased high-level IADLs  Assessment: Patient presents with the above deficits impacting occupational performance and functioning below baseline level. Recommend skilled OT to address deficits and promote functional independence. Significant hemiparesis and expressive and receptive aphasia, more spontaneous and functional use of R UE to participate in functional tasks noted today, increased verbalizations and initiation of movement with family present   Treatment Diagnosis: Decreased functional mobility, ADL status, decreased safety, decreased cognition, decreased balance and endurance associated with CVA  Prognosis: Fair;Good  OT Education: Transfer Training;OT Role;ADL Adaptive Strategies; Plan of Care  Patient Education: pt would benefit from continued reinforcement  Barriers to Learning: cognition, aphasia   REQUIRES OT FOLLOW UP: Yes  Activity Tolerance  Activity Tolerance: Patient Tolerated treatment well  Safety Devices  Safety Devices in place: Yes  Type of devices: Call light within reach; Chair alarm in place; Left in chair  Restraints  Initially in place: No         Patient Diagnosis(es): CVA     has a past medical history of Arthritis, CAD (coronary artery disease), GERD (gastroesophageal reflux disease), Hx of blood clots, Hyperlipidemia, and Hypertension. has a past surgical history that includes Cholecystectomy; Appendectomy; Varicose vein surgery;  Endoscopy, colon, diagnostic; eye

## 2020-04-29 NOTE — PROGRESS NOTES
Physical Therapy  Facility/Department: [facility] ACUTE REHAB UNIT  Daily Treatment Note  NAME: Makayla Fields  : 1934  MRN: [id number]    Date of Service: 2020    Discharge Recommendations:  Patient would benefit from continued therapy after discharge, S Level 3, 24 hour supervision or assist   PT Equipment Recommendations  Other: Has W/c and RW at home; additional needs to be addressed based on progress    Assessment   Body structures, Functions, Activity limitations: Decreased functional mobility ; Decreased ROM; Decreased strength;Decreased ADL status; Decreased safe awareness;Decreased cognition;Decreased endurance;Decreased sensation;Decreased balance;Decreased fine motor control;Decreased coordination;Decreased posture  Assessment: Pt continues to be more interactive, especially with family present. Pt able to follow 1 step commands consistently and it initiating movement. Pt would continue to benefit from skilled PT to progress. Treatment Diagnosis: RLE weakness, impaired balance, ambulation deficits  Prognosis: Fair  Patient Education:  & : Transfers, midline alignment. Pt need reinforcement.  - : Transfers. Pt able to follow one step commands, needs reinforcement. Barriers to Learning: Cognition,communication  REQUIRES PT FOLLOW UP: Yes  Activity Tolerance  Activity Tolerance: Patient Tolerated treatment well     Patient Diagnosis(es): Stroke   has a past medical history of Arthritis, CAD (coronary artery disease), GERD (gastroesophageal reflux disease), Hx of blood clots, Hyperlipidemia, and Hypertension. has a past surgical history that includes Cholecystectomy; Appendectomy; Varicose vein surgery; Endoscopy, colon, diagnostic; eye surgery; joint replacement; and Breast surgery. Restrictions  Restrictions/Precautions  Restrictions/Precautions: Fall Risk  Required Braces or Orthoses?: No  Position Activity Restriction  Other position/activity restrictions:  Makayla Fields is a 80 y.o. female who presented to the emergency department  for evaluation for right leg weakness. The patient has a history of hypertension and hyperlipidemia. To ARU 4/20/20     Subjective   General  Chart Reviewed: Yes  Additional Pertinent Hx: CAD, DVT, R TSA, HTN  Response To Previous Treatment: Patient with no complaints from previous session. Family / Caregiver Present: Yes(, Ed and granddaughter present)  Subjective  Subjective: Agreeable to PT/to with no c/o. General Comment  Comments: In Bed upon arrival       Orientation  Orientation  Overall Orientation Status: Within Functional Limits(appears WFL as she is following conversation and able to interact with family)     Objective   Bed mobility  Rolling to Left: Moderate assistance  Rolling to Right: Moderate assistance  Supine to Sit: Moderate assistance  Scooting: Moderate assistance     Transfers  Sit to Stand: 2 Person Assistance(Mod A 2 )  Stand to sit: 2 Person Assistance  Bed to Chair: 2 Person Assistance  Stand Pivot Transfers: 2 Person Assistance  Squat Pivot Transfers: 2 Person Assistance  Lateral Transfers: 2 Person Assistance     Comment: Co-treat for safety and to maximize function. Performed EOB activities for balance including reaching forward and laterally using RUE as well as L. Performed seated grooming, maintaining sitting balance with CGA. Performed sit to stand with Mod A 2, progressing to lateral wt shifts and reaching forward to . Stand-pivot to w/c with assist of 2. Stand-pivot to toilet; dependent assist for clothing and otf care. Stand-pivot to seated stepper. Performed seated stepper x 6 minutes with intermittent TC to R knee; maintaining R hand  throughout.   Left up in w/c at end of session with Chair alarm activated, call light and needs in reach    Goals  Short term goals  Time Frame for Short term goals: 1 week  Short term goal 1: Pt will perform bed mobilty with Mod A 1  Short term goal 2: Pt

## 2020-04-29 NOTE — PROGRESS NOTES
German York  4/29/2020  [id number]    Chief Complaint: Acute ischemic stroke ([facility] [address]    Subjective:   No overnight events. No current complaints. Still requiring ICs. Leukocytosis resolved. Still with significant difficulty focusing on a task. ROS: No CP, SOB, dyspnea    Objective:  Patient Vitals for the past 24 hrs:   BP Temp Temp src Pulse Resp SpO2   04/29/20 0845 122/73 97.8 °F (36.6 °C) -- 90 16 95 %   04/28/20 2116 (!) 153/77 98.1 °F (36.7 °C) Oral 77 16 97 %     Gen: No distress, pleasant. Resting in bed  HEENT: Normocephalic, atraumatic   CV: Regular rate and rhythm. No MRG   Resp: No respiratory distress. CTAB   Abd: Soft, nontender   Ext: No edema. OA changes at BL knees  Neuro: Alert, oriented x3 (not place), aphasia, appropriately interactive. RUE 2/5 with exception of . RLE 0/5 HF 2/5 KE/DF/PF    Laboratory data: Available via EMR. Therapy progress:  PT  Position Activity Restriction  Other position/activity restrictions: German York is a 80 y.o. female who presented to the emergency department  for evaluation for right leg weakness. The patient has a history of hypertension and hyperlipidemia. To ARU 4/20/20  Objective     Sit to Stand: 2 Person Assistance(Mod A 2)  Stand to sit: 2 Person Assistance  Bed to Chair: Dependent/Total(Max+Mod A)     OT  PT Equipment Recommendations  Equipment Needed: No  Other: Has W/c and RW at home; additional needs to be addressed based on progress  Toilet - Technique: Stand pivot  Equipment Used: Extra wide bedside commode  Toilet Transfers Comments: max A of 1/mod A of 1 with BSC in shower for bathing   Assessment        SLP  Current Diet : Regular  Current Liquid Diet : Thin  Diet Solids Recommendation: Dysphagia Soft and Bite-Sized (Dysphagia III)  Liquid Consistency Recommendation: Thin    Body mass index is 26.38 kg/m².     Assessment:  Patient Active Problem List   Diagnosis    Acute cerebral infarction ([facility] [place] [address]    Monoparesis of leg ([facility] [address]   

## 2020-04-29 NOTE — PATIENT CARE CONFERENCE
[facility]  Inpatient Rehabilitation  Weekly Team Conference Note    Patient Name: Jameel Truong        MRN: [id number]    : 1934  (80 y.o.)  Gender: female      Diagnosis: L frontal CVA     The team conference for this patient was held on 20 at 11:00am by:  Dennis Santana MD    CASE MANAGEMENT:  Assessment: Pt lives with spouse at home in a two story house with a full bathroom on first floor, no home care prior. Pt already has a walker and wheelchair at home. Pt was an active  prior. Dtr is uncertain if pt has advance directives in place, no documents in Epic. PSYCHOLOGY:  Assessment:     PHYSICAL THERAPY:    Bed Mobility:   Scooting: Maximal assistance(to EOB)    Transfers:  Sit to Stand: 2 Person Assistance(Mod A 2)  Stand to sit: 2 Person Assistance  Bed to Chair: Dependent/Total(Max+Mod A)  Squat Pivot Transfers: 2 Person Assistance  Comment: Co-treat for safety and to maximize function. Assisted with sponge bath and dressing from recliner; pt exhibits activation and purposeful use of RUE intermittently. Performed oral care at sink.                QM:  Roll Left and Right  Assistance Needed: Substantial/maximal assistance  CARE Score: 2  Discharge Goal: Independent  Sit to Lying  Assistance Needed: Dependent  CARE Score: 1  Discharge Goal: Supervision or touching assistance  Lying to Sitting on Side of Bed  Assistance Needed: Substantial/maximal assistance  CARE Score: 2  Discharge Goal: Supervision or touching assistance  Sit to Stand  Assistance Needed: Dependent  Reason if not Attempted: Not attempted due to medical condition or safety concerns  CARE Score: 1  Discharge Goal: Supervision or touching assistance  Chair/Bed-to-Chair Transfer  Assistance Needed: Dependent  CARE Score: 1  Discharge Goal: Supervision or touching assistance  Car Transfer  Reason if not Attempted: Not attempted due to medical condition or safety concerns  CARE Score: 88  Discharge solids and signs of premature bolus loss. Laryngeal elevation moderately reduced however no overt s/s of aspiration were assessed. Pt required cues to initiate eating then was slightly impulsive with rate of intake. Attempted to provide pills in puree (from RN) however pt stripping and chewing the pills. Pt had difficulty following single step commands for OME but overall strength and coordination was moderately impaired. At this time recommend diet downgrade to soft and bite sized solid (dysphagia III), continue with thin liquids, strict aspiration precautions and diet tolerance monitoring. Continue to provide pills with water one at a time but will need close supervision. OCCUPATIONAL THERAPY:    ADL:   ADL  Feeding: Supervision, Setup  Grooming: Moderate assistance(assist to thoroughness with oral care, pt able to hold comb in R hand while therapist supported elbow/shoulder to comb hair )  UE Bathing: Maximum assistance(sponge bathing )  LE Bathing: Dependent/Total  UE Dressing: Maximum assistance(doff/ashish pull over shirt )  LE Dressing: Dependent/Total(pants )  Toileting: (incontinent of bowel while in shower )  Additional Comments: pt in reclining chair on arrival - sponge bathing and dressing from reclining chair - SPT to w/c with max A of 1/mod A Of 1 and for standing to pull up pants - completed grooming from w/c at sink     Toilet Transfers:   Toilet Transfers  Toilet - Technique: Stand pivot  Equipment Used: Extra wide bedside commode  Toilet Transfer: Dependent/Total, 2 Person assistance  Toilet Transfers Comments: max A of 1/mod A of 1 with BSC in shower for bathing     Tub/ShowerTransfers:          QM:  Eating  Assistance Needed: Dependent  CARE Score: 1  Discharge Goal: Independent  Oral Hygiene  Assistance Needed: Supervision or touching assistance  CARE Score: 4  Discharge Goal: Set-up or clean-up assistance  Toileting Hygiene  Assistance Needed: Dependent  CARE Score: 1  Discharge Goal: LSW  Sandie Dominique RD, LD    Derick Xavier, Neuropsychologist    Sangita Scruggs, OTR/L    Antonio Gifford, PT, CLT, NDT    Laura Wilde M.A., Letha Moran, BSN, RN, CRRKING [place], [address] [place] [address],     I approve the established interdisciplinary plan of care as documented within the medical record of [facility].     Amparo Lemus MD  Electronically signed by Amparo Lemus MD on 4/30/2020 at 12:07 PM

## 2020-04-29 NOTE — PLAN OF CARE
Problem: Pain:  Description: Pain management should include both nonpharmacologic and pharmacologic interventions. Goal: Pain level will decrease  Description: Pain level will decrease  4/28/2020 2103 by Melody Maynard RN  Outcome: Ongoing  4/28/2020 1027 by Alan Grajeda RN  Outcome: Ongoing     Problem: Pain:  Description: Pain management should include both nonpharmacologic and pharmacologic interventions. Goal: Control of acute pain  Description: Control of acute pain  4/28/2020 2103 by Melody Maynard RN  Outcome: Ongoing  4/28/2020 1027 by Alan Grajeda RN  Outcome: Ongoing     Problem: Pain:  Description: Pain management should include both nonpharmacologic and pharmacologic interventions.   Goal: Control of chronic pain  Description: Control of chronic pain  4/28/2020 2103 by Melody Maynard RN  Outcome: Ongoing  4/28/2020 1027 by Alan Grajeda RN  Outcome: Ongoing

## 2020-04-30 PROCEDURE — 92526 ORAL FUNCTION THERAPY: CPT

## 2020-04-30 PROCEDURE — 6360000002 HC RX W HCPCS: Performed by: PHYSICAL MEDICINE & REHABILITATION

## 2020-04-30 PROCEDURE — 1280000000 HC REHAB R&B

## 2020-04-30 PROCEDURE — 97535 SELF CARE MNGMENT TRAINING: CPT

## 2020-04-30 PROCEDURE — 51798 US URINE CAPACITY MEASURE: CPT

## 2020-04-30 PROCEDURE — 97530 THERAPEUTIC ACTIVITIES: CPT

## 2020-04-30 PROCEDURE — 51701 INSERT BLADDER CATHETER: CPT

## 2020-04-30 PROCEDURE — 97129 THER IVNTJ 1ST 15 MIN: CPT

## 2020-04-30 PROCEDURE — 97116 GAIT TRAINING THERAPY: CPT

## 2020-04-30 PROCEDURE — 6370000000 HC RX 637 (ALT 250 FOR IP): Performed by: PHYSICAL MEDICINE & REHABILITATION

## 2020-04-30 PROCEDURE — 92507 TX SP LANG VOICE COMM INDIV: CPT

## 2020-04-30 RX ADMIN — ENOXAPARIN SODIUM 40 MG: 40 INJECTION SUBCUTANEOUS at 08:18

## 2020-04-30 RX ADMIN — ACETAMINOPHEN 650 MG: 325 TABLET, FILM COATED ORAL at 08:17

## 2020-04-30 RX ADMIN — CALCIUM 500 MG: 500 TABLET ORAL at 08:20

## 2020-04-30 RX ADMIN — DESMOPRESSIN ACETATE 40 MG: 0.2 TABLET ORAL at 22:24

## 2020-04-30 RX ADMIN — CLOPIDOGREL 75 MG: 75 TABLET, FILM COATED ORAL at 08:20

## 2020-04-30 RX ADMIN — CIPROFLOXACIN 500 MG: 500 TABLET, FILM COATED ORAL at 08:20

## 2020-04-30 RX ADMIN — FAMOTIDINE 20 MG: 20 TABLET ORAL at 08:20

## 2020-04-30 RX ADMIN — METHYLPHENIDATE HYDROCHLORIDE 5 MG: 10 TABLET ORAL at 12:22

## 2020-04-30 RX ADMIN — ASPIRIN 81 MG: 81 TABLET, COATED ORAL at 08:20

## 2020-04-30 RX ADMIN — METHYLPHENIDATE HYDROCHLORIDE 5 MG: 10 TABLET ORAL at 06:20

## 2020-04-30 RX ADMIN — LOSARTAN POTASSIUM 100 MG: 100 TABLET, FILM COATED ORAL at 08:20

## 2020-04-30 RX ADMIN — CIPROFLOXACIN 500 MG: 500 TABLET, FILM COATED ORAL at 22:23

## 2020-04-30 RX ADMIN — FAMOTIDINE 20 MG: 20 TABLET ORAL at 22:24

## 2020-04-30 RX ADMIN — AMLODIPINE BESYLATE 10 MG: 5 TABLET ORAL at 08:20

## 2020-04-30 ASSESSMENT — PAIN SCALES - GENERAL
PAINLEVEL_OUTOF10: 0

## 2020-04-30 ASSESSMENT — PAIN SCALES - WONG BAKER
WONGBAKER_NUMERICALRESPONSE: 0

## 2020-04-30 ASSESSMENT — PAIN DESCRIPTION - FREQUENCY: FREQUENCY: INTERMITTENT

## 2020-04-30 NOTE — PROGRESS NOTES
Hanh Islas  4/30/2020  [id number]    Chief Complaint: Acute ischemic stroke [facility])    Subjective:   No overnight events. No current complaints. Improved participation in therapy. Using RUE to attempt to eat. ROS: No CP, SOB, dyspnea    Objective:  Patient Vitals for the past 24 hrs:   BP Temp Temp src Pulse Resp SpO2   04/30/20 0817 124/71 98 °F (36.7 °C) Oral 85 16 94 %   04/29/20 2000 111/62 97.5 °F (36.4 °C) Oral 80 16 96 %     Gen: No distress, pleasant. Resting in bed  HEENT: Normocephalic, atraumatic   CV: Regular rate and rhythm. No MRG   Resp: No respiratory distress. CTAB   Abd: Soft, nontender   Ext: No edema. OA changes at BL knees  Neuro: Alert, oriented x4, aphasia, appropriately interactive. Laboratory data: Available via EMR. Therapy progress:  PT  Position Activity Restriction  Other position/activity restrictions: Hanh Islas is a 80 y.o. female who presented to the emergency department  for evaluation for right leg weakness. The patient has a history of hypertension and hyperlipidemia. To ARU 4/20/20  Objective     Sit to Stand: 2 Person Assistance(Mod A 2 )  Stand to sit: 2 Person Assistance  Bed to Chair: 2 Person Assistance     OT  PT Equipment Recommendations  Equipment Needed: No  Other: Has W/c and RW at home; additional needs to be addressed based on progress  Toilet - Technique: Stand pivot  Equipment Used: Extra wide bedside commode  Toilet Transfers Comments: max A of 1 to toilet, max A of 1/min A of 1 off toilet   Assessment        SLP  Current Diet : Regular  Current Liquid Diet : Thin  Diet Solids Recommendation: Dysphagia Soft and Bite-Sized (Dysphagia III)  Liquid Consistency Recommendation: Thin    Body mass index is 26.38 kg/m².     Assessment:  Patient Active Problem List   Diagnosis    Acute cerebral infarction ([address]    Monoparesis of leg ([address]    Expressive aphasia    Essential hypertension    Cerebrovascular disease    ASHD (arteriosclerotic heart

## 2020-04-30 NOTE — PLAN OF CARE
Problem: Falls - Risk of:  Goal: Will remain free from falls  Description: Will remain free from falls  4/30/2020 1904 by Sonia Pinzon RN  Outcome: Ongoing  4/30/2020 1903 by Sonia Pinzon RN  Outcome: Ongoing     Problem: Pain:  Goal: Pain level will decrease  Description: Pain level will decrease  4/30/2020 1904 by Sonia Pinzon RN  Outcome: Ongoing  4/30/2020 1903 by Sonia Pinzon RN  Outcome: Ongoing     Problem: PAIN  Goal: LTG - Patient will manage pain with the appropriate technique/Intervention  4/30/2020 1904 by Sonia Pinzon RN  Outcome: Ongoing  4/30/2020 1903 by Sonia Pinzon RN  Outcome: Ongoing

## 2020-04-30 NOTE — PLAN OF CARE
Problem: Falls - Risk of:  Goal: Will remain free from falls  Description: Will remain free from falls  4/30/2020 0231 by Jon Retana RN  Outcome: Ongoing  4/29/2020 1624 by Gregorio Moran RN  Outcome: Ongoing  Goal: Absence of physical injury  Description: Absence of physical injury  Outcome: Ongoing     Problem: Pain:  Goal: Pain level will decrease  Description: Pain level will decrease  4/30/2020 0231 by Jon Retana RN  Outcome: Ongoing  4/29/2020 1624 by Gregorio Moran RN  Outcome: Ongoing  Goal: Control of acute pain  Description: Control of acute pain  Outcome: Ongoing  Goal: Control of chronic pain  Description: Control of chronic pain  Outcome: Ongoing     Problem: IP BLADDER/VOIDING  Goal: LTG - patient will complete bladder elimination  4/30/2020 0231 by Jon Retana RN  Outcome: Ongoing  4/29/2020 1624 by Gregorio Moran RN  Outcome: Ongoing  Goal: LTG - patient will achieve acceptable level of continence  Outcome: Ongoing  Goal: STG - patient demonstrates self-cath technique using clean technique and care of the catheter  Outcome: Ongoing  Goal: STG - Patient demonstrates no accidents  Outcome: Ongoing     Problem: IP BOWEL ELIMINATION  Goal: STG - patient will be accident free  4/30/2020 0231 by Jon Retana RN  Outcome: Ongoing  4/29/2020 1624 by Gregorio Moran RN  Outcome: Ongoing  Goal: STG - patient maintains skin integrity  Outcome: Ongoing     Problem: NUTRITION  Goal: Patient maintains adequate hydration  Outcome: Ongoing     Problem: SAFETY  Goal: LTG - patient will adhere to hip precautions during ADL's and transfers  4/30/2020 0231 by Jno Retana RN  Outcome: Ongoing  4/29/2020 1624 by Gregorio Moran RN  Outcome: Ongoing  Goal: LTG - Patient will demonstrate safety requirements appropriate to situation/environment  Outcome: Ongoing  Goal: LTG - patient will utilize safety techniques  Outcome: Ongoing  Goal: STG - Patient uses call light consistently to request assistance with

## 2020-04-30 NOTE — CARE COORDINATION
Faxed updated clinicals to [facility] for extended stay review.     Auth #: [id number]  Fax #: [phone]

## 2020-04-30 NOTE — PROGRESS NOTES
ACUTE REHAB UNIT  SPEECH/LANGUAGE PATHOLOGY      [x] Daily  [x] Weekly Care Conference Note  [] Discharge    Patient:Hanna Taylor     :1934  ZZW:[id number]  Room #: TML-8073/6792-78   Rehab Dx/Hx: Acute ischemic stroke [facility]) [I63.9]  Date of Admit: 2020      Precautions: falls and aspirations  Home situation: Pt lives at home with her . Pt was independent prior to admission   ST Dx: Expressive and receptive aphasia with apraxia component; Cognitive deficits; Dysphagia     Daily Treatment Info:   Date: 2020   Tx session 1 Tx session 2   Pain None identified  None identified    Subjective     Pt sitting upright in wheelchair with granddaughter and  present during session. Pt much more interactive, responsive with improved verbal output than in previous sessions. Perseverative thought x 1 during session. Pt sitting in w/c looking at word search book with lunch tray positioned covered next to pt on bed. Poor initiation/ request for assistance with lunch tray. Pt alert and cooperative throughout. Weekly Update: Improved comprehension, verbal output and overall expression. Pt much more alert and engaged since Ritalin started. Pt making progress towards all goals, continue POC. Objective:  Goals     Pt will tolerate recommended diet and regular solids with no overt s/s of aspiration, noticeable fatigue or significant pocketing. Goal not targeted this session. Assisted pt with set up of tray. Pt consumed adv soft solids (chicken pasta/ bread with butter) with slightly prolonged but adequate mastication and effective oral clearance with no overt s/s of aspiration. Tolerated drinks of thins via cup in combination with other textures with no overt s/s of aspiration.      Progressing, continue (due to improved initiation may be able to be trialed and upgraded to regular texture diet in future)      Pt will improve comprehension of basic commands and yes/no questions to 70% accuracy via graded tasks. - Improved comprehension to answering personal questions  - Required significant repetition with answering short concrete/ factual questions but appeared related to pt being [facility] (per granddaughter R hearing aid being repaired and should be here by tomorrow) 2/7 initial presentation/ improved to 7/7 with repetition. Pt stated \"I don't understand\" x 1    - Following written 1 step directions with directives- 5/8 (63%) reduced attention to details vs comprehension of directions    Goal not targeted this session. Required ongoing repetition for naming tasks due to pt being [facility] and not have R hearing aid. Improved comprehension with pt reading. Progressing, continue (suspect further improvement with R hearing aid in place)        Pt will complete basic expressive language tasks (sentence completion, naming, automatics) to 70% accuracy with min cues     Addend: Patient will complete moderate level verbal description and word retrieval tasks with >80% acc min cues. Seemingly unable to come up with representation of what 'honey' was. Granddaughter had to explain multiple times with SLP providing semantic cueing  - Naming Category Members (concrete) provided with initial letter- 9/12 (75%)     - Basic Responsive Naming- 10/10 (100%)    GOAL MET (88% acc Addend goal)      Pt will complete graded problem-solving tasks with >80% accuracy. NEW GOAL  NEW GOAL    Pt will communicate basic wants/ needs and personal information utilizing multi-modalities with < mod cues.   - Able to communicate effectively and answer personal questions throughout session  - When asked able to determine to keep door open at end of session   - Able to tell SLP how she wanted assistance/ food prepared on tray when SLP asked    GOAL MET (able to indicate wants/ needs Independently)    Other areas targeted: Providing Personal information (description task)-  - Named 2 family members present  - Named 2/2 children, 2/2 in-laws, 5/5 grandchildren and 2/2 children (provided adequate description of ages and locations with only min direction/ cueing from granddaughter)   - Stated current address  - Able to state how long she's been , date of anniversary, how she met   - Able to name past ponies/ horses with min cues from granddaughter     Wrote name/ address able to self correct errors. Reduced legibility with pt writing letters over others with poor awareness of spacing and use of affected hand. Subtest of ENEDELIA (Assessment of Language-Related Functional Activities) completed with pt:  - Subtest 1: Telling Time- 9/10 (90%) in 1:51 pt self corrected Independently x2    Education:   Ongoing re rationale for therapy. Pt unable to demonstrate comprehension. Ongoing re rationale for therapy and overall POC. Pt v/u. Safety Devices: [x] Call light within reach  [x] Chair alarm activated  [] Bed alarm activated  [] Other: [x] Call light within reach  [x] Chair alarm activated  [] Bed alarm activated  [] Other:      Assessment:   Speech Therapy Diagnosis  Cognitive Diagnosis: Further assessment warranted however demonstrated difficulty attending during evaluation, easily distracted by external stimuli in room, appeared grossly oriented via responses to f/2 verbal choices   Aphasia Diagnosis: Pt presents with moderate to severe expressive and receptive aphasia with suspected apraxia component. Pt with difficulty initiating verbalization and inconsistent errors during evaluation. Pt was limited in responses to yes/no questions and when following basic commands. Pt made few intelligible verbalizations in response to automatic questions. Paraphasias were evident during naming tasks. Current Diet Order: DIET DYSPHAGIA SOFT AND BITE-SIZED;   Dietary Nutrition Supplements: Standard High Calorie Oral Supplement   Recommended Form of Meds: Whole with water(One at a time )  Compensatory Swallowing Strategies: Small bites/sips, Eat/Feed slowly, No straws, Upright as possible for all oral intake     Plan:     Frequency:  5days/week   60 minutes/day  Discharge Recommendations:   Barriers: Communication deficits; Awareness; Initiation; Attention   Discharge Recommendations:  [] Home independently  [] Home with assistance [x]  24 hour supervision  [] SNF [] Other:  Continued SLP Treatment:  [x] Yes [] No [] TBD based on progress while on ARU [] Vital Stim indicated [] Other:   Estimated discharge date: 5/13/20    Type of Total Treatment Minutes   Session 1   Session 2   Time In 5904 [id number]   Time Out 1100 1315   Timed Code Minutes  10 0   Individual Treatment Minutes  30 30   Co-Treatment Minutes      Group Treatment Minutes      Concurrent Treatment Minutes        TOTAL DAILY MINUTES:  60     Electronically Signed by     Aftab GONZALES CCC-SLP #[id number] 4/30/2020 7:42 AM  Speech-Language Pathologist

## 2020-05-01 ENCOUNTER — APPOINTMENT (OUTPATIENT)
Dept: GENERAL RADIOLOGY | Age: 85
DRG: 057 | End: 2020-05-01
Attending: PHYSICAL MEDICINE & REHABILITATION
Payer: MEDICARE

## 2020-05-01 LAB
ANION GAP SERPL CALCULATED.3IONS-SCNC: 12 MMOL/L (ref 3–16)
BILIRUBIN URINE: NEGATIVE
BLOOD, URINE: NEGATIVE
BUN BLDV-MCNC: 33 MG/DL (ref 7–20)
CALCIUM SERPL-MCNC: 9.4 MG/DL (ref 8.3–10.6)
CHLORIDE BLD-SCNC: 97 MMOL/L (ref 99–110)
CLARITY: CLEAR
CO2: 27 MMOL/L (ref 21–32)
COLOR: YELLOW
CREAT SERPL-MCNC: 0.6 MG/DL (ref 0.6–1.2)
GFR AFRICAN AMERICAN: >60
GFR NON-AFRICAN AMERICAN: >60
GLUCOSE BLD-MCNC: 103 MG/DL (ref 70–99)
GLUCOSE URINE: NEGATIVE MG/DL
HCT VFR BLD CALC: 39.8 % (ref 36–48)
HEMOGLOBIN: 13 G/DL (ref 12–16)
KETONES, URINE: NEGATIVE MG/DL
LEUKOCYTE ESTERASE, URINE: NEGATIVE
MCH RBC QN AUTO: 28 PG (ref 26–34)
MCHC RBC AUTO-ENTMCNC: 32.6 G/DL (ref 31–36)
MCV RBC AUTO: 85.9 FL (ref 80–100)
MICROSCOPIC EXAMINATION: NORMAL
NITRITE, URINE: NEGATIVE
PDW BLD-RTO: 14.2 % (ref 12.4–15.4)
PH UA: 7.5 (ref 5–8)
PLATELET # BLD: 507 K/UL (ref 135–450)
PMV BLD AUTO: 7.8 FL (ref 5–10.5)
POTASSIUM SERPL-SCNC: 4.3 MMOL/L (ref 3.5–5.1)
PROTEIN UA: NEGATIVE MG/DL
RBC # BLD: 4.64 M/UL (ref 4–5.2)
SODIUM BLD-SCNC: 136 MMOL/L (ref 136–145)
SPECIFIC GRAVITY UA: 1.01 (ref 1–1.03)
URINE REFLEX TO CULTURE: NORMAL
URINE TYPE: NORMAL
UROBILINOGEN, URINE: 0.2 E.U./DL
WBC # BLD: 14.1 K/UL (ref 4–11)

## 2020-05-01 PROCEDURE — 85027 COMPLETE CBC AUTOMATED: CPT

## 2020-05-01 PROCEDURE — 36415 COLL VENOUS BLD VENIPUNCTURE: CPT

## 2020-05-01 PROCEDURE — 97140 MANUAL THERAPY 1/> REGIONS: CPT

## 2020-05-01 PROCEDURE — 81003 URINALYSIS AUTO W/O SCOPE: CPT

## 2020-05-01 PROCEDURE — 71045 X-RAY EXAM CHEST 1 VIEW: CPT

## 2020-05-01 PROCEDURE — 92526 ORAL FUNCTION THERAPY: CPT

## 2020-05-01 PROCEDURE — 6360000002 HC RX W HCPCS: Performed by: PHYSICAL MEDICINE & REHABILITATION

## 2020-05-01 PROCEDURE — 97535 SELF CARE MNGMENT TRAINING: CPT

## 2020-05-01 PROCEDURE — 97530 THERAPEUTIC ACTIVITIES: CPT

## 2020-05-01 PROCEDURE — 6370000000 HC RX 637 (ALT 250 FOR IP): Performed by: PHYSICAL MEDICINE & REHABILITATION

## 2020-05-01 PROCEDURE — 51798 US URINE CAPACITY MEASURE: CPT

## 2020-05-01 PROCEDURE — 1280000000 HC REHAB R&B

## 2020-05-01 PROCEDURE — 92507 TX SP LANG VOICE COMM INDIV: CPT

## 2020-05-01 PROCEDURE — 97129 THER IVNTJ 1ST 15 MIN: CPT

## 2020-05-01 PROCEDURE — 80048 BASIC METABOLIC PNL TOTAL CA: CPT

## 2020-05-01 PROCEDURE — 51701 INSERT BLADDER CATHETER: CPT

## 2020-05-01 RX ORDER — TAMSULOSIN HYDROCHLORIDE 0.4 MG/1
0.4 CAPSULE ORAL DAILY
Status: DISCONTINUED | OUTPATIENT
Start: 2020-05-01 | End: 2020-05-19

## 2020-05-01 RX ADMIN — AMLODIPINE BESYLATE 10 MG: 5 TABLET ORAL at 08:13

## 2020-05-01 RX ADMIN — DESMOPRESSIN ACETATE 40 MG: 0.2 TABLET ORAL at 22:26

## 2020-05-01 RX ADMIN — FAMOTIDINE 20 MG: 20 TABLET ORAL at 08:12

## 2020-05-01 RX ADMIN — LOSARTAN POTASSIUM 100 MG: 100 TABLET, FILM COATED ORAL at 08:13

## 2020-05-01 RX ADMIN — CIPROFLOXACIN 500 MG: 500 TABLET, FILM COATED ORAL at 08:13

## 2020-05-01 RX ADMIN — TRAMADOL HYDROCHLORIDE 50 MG: 50 TABLET, FILM COATED ORAL at 06:36

## 2020-05-01 RX ADMIN — METHYLPHENIDATE HYDROCHLORIDE 5 MG: 10 TABLET ORAL at 06:35

## 2020-05-01 RX ADMIN — ENOXAPARIN SODIUM 40 MG: 40 INJECTION SUBCUTANEOUS at 08:13

## 2020-05-01 RX ADMIN — METHYLPHENIDATE HYDROCHLORIDE 5 MG: 10 TABLET ORAL at 10:25

## 2020-05-01 RX ADMIN — TAMSULOSIN HYDROCHLORIDE 0.4 MG: 0.4 CAPSULE ORAL at 11:30

## 2020-05-01 RX ADMIN — CALCIUM 500 MG: 500 TABLET ORAL at 08:13

## 2020-05-01 RX ADMIN — CIPROFLOXACIN 500 MG: 500 TABLET, FILM COATED ORAL at 22:26

## 2020-05-01 RX ADMIN — CLOPIDOGREL 75 MG: 75 TABLET, FILM COATED ORAL at 08:12

## 2020-05-01 RX ADMIN — ASPIRIN 81 MG: 81 TABLET, COATED ORAL at 08:12

## 2020-05-01 RX ADMIN — FAMOTIDINE 20 MG: 20 TABLET ORAL at 22:26

## 2020-05-01 ASSESSMENT — PAIN SCALES - WONG BAKER
WONGBAKER_NUMERICALRESPONSE: 0

## 2020-05-01 ASSESSMENT — PAIN SCALES - GENERAL
PAINLEVEL_OUTOF10: 0
PAINLEVEL_OUTOF10: 6
PAINLEVEL_OUTOF10: 0

## 2020-05-01 NOTE — PROGRESS NOTES
Govind Rangel  5/1/2020  [id number]    Chief Complaint: Acute ischemic stroke ([facility]    Subjective:   No overnight events. No current complaints. Improved participation in therapy and movement of RUE/RLE. Still requiring ICs. Leukocytosis returned. ROS: No CP, SOB, dyspnea    Objective:  Patient Vitals for the past 24 hrs:   BP Temp Temp src Pulse Resp SpO2   05/01/20 0745 129/76 98 °F (36.7 °C) Oral 93 18 94 %   04/30/20 2215 128/68 97.9 °F (36.6 °C) Oral 73 16 96 %     Gen: No distress, pleasant. Resting in bed  HEENT: Normocephalic, atraumatic   CV: Regular rate and rhythm. No MRG   Resp: No respiratory distress. CTAB   Abd: Soft, nontender   Ext: No edema. OA changes at BL knees  Neuro: Alert, oriented x4, aphasia, appropriately interactive. RLE 3/5 diffusely    Laboratory data: Available via EMR. Therapy progress:  PT  Position Activity Restriction  Other position/activity restrictions: Govind Rangel is a 80 y.o. female who presented to the emergency department  for evaluation for right leg weakness. The patient has a history of hypertension and hyperlipidemia. To ARU 4/20/20  Objective     Sit to Stand: Moderate Assistance  Stand to sit: Moderate Assistance  Bed to Chair: 2 Person Assistance(Mod A & CGA)  Device: Parallel Bars  Assistance: 2 Person assistance(Min A and CGA )  Distance: 8' x 2  OT  PT Equipment Recommendations  Equipment Needed: No  Other: Has W/c and RW at home; additional needs to be addressed based on progress  Toilet - Technique: Stand pivot  Equipment Used: Extra wide bedside commode  Toilet Transfers Comments: mod A of 1/CGA of another   Assessment        SLP  Current Diet : Regular  Current Liquid Diet : Thin  Diet Solids Recommendation: Dysphagia Soft and Bite-Sized (Dysphagia III)  Liquid Consistency Recommendation: Thin    Body mass index is 26.38 kg/m².     Assessment:  Patient Active Problem List   Diagnosis    Acute cerebral infarction ([facility]    Monoparesis of leg

## 2020-05-01 NOTE — PROGRESS NOTES
Cognitive Status: Exceptions  Arousal/Alertness: Delayed responses to stimuli  Following Commands: Follows one step commands with increased time; Follows one step commands with repetition  Attention Span: Difficulty attending to directions; Attends with cues to redirect; Difficulty dividing attention  Safety Judgement: Decreased awareness of need for safety  Problem Solving: Decreased awareness of errors;Assistance required to identify errors made;Assistance required to correct errors made  Insights: Not aware of deficits  Initiation: Requires cues for some  Sequencing: Requires cues for some        Additional Activities Comment  Additional Activities: Pt completed 5 min on seated stepper in order to address bilateral movement task         Comment: Pt completed lateral scooting EOM w/ Max A + Min A. Pt completed gentle AAROM of bilateral UE in sitting, gentle scap mobilization to improve ROM. Supine>sit=Mod A. Ball placed under pt's LE, completed gentle stretching, reciprocal rocking. Pt assisted to side lying. Completed trunk rotation stretching in L/R side lying.       Plan   Plan  Times per week: 60 minutes 5 days per week   Times per day: Daily  Specific instructions for Next Treatment: continue with cotx   Current Treatment Recommendations: Functional Mobility Training, Endurance Training, Safety Education & Training, Self-Care / ADL, Home Management Training    Goals  Short term goals  Time Frame for Short term goals: 1 week   Short term goal 1: mod A of 1 functional transfers- dependent (max A of 1, mod A of 1) 4/23  Short term goal 2: mod A of 1 UB bathing/dressing- max A 4/23  Short term goal 3: max A of 1 LB bathing/dressing- max A to dependent 4/23  Long term goals  Time Frame for Long term goals : 3 weeks   Long term goal 1: CGA functional transfers   Long term goal 2: min A UB bathing/dressing, set up grooming  Long term goal 3: mod A LB bathing/dressing and toileting   Long term goal 4: w/c mobility for

## 2020-05-01 NOTE — PROGRESS NOTES
ACUTE REHAB UNIT  SPEECH/LANGUAGE PATHOLOGY      [x] Daily  [] Weekly Care Conference Note  [] Discharge    Patient:Hanna Lopez     :1934  KJE:[id number]  Room #: JPH-2613/9470-16   Rehab Dx/Hx: Acute ischemic stroke [facility]) [I63.9]  Date of Admit: 2020      Precautions: falls and aspirations  Home situation: Pt lives at home with her . 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 Pt required occasional assistance with self feeding due to reduced object recognition/ apraxia ? Requiring verbal/ tactile cues to use non dominant hand for increased control. Pt pleasant and cooperative. Reduced comprehension/ non responses appears to be due to pt being [facility] as she does not have R hearing aid present at this time. Pt appeared to hear better with SLP positioned on pt's L side. Notified staff of need for assist feed/ set up. Pt up in chair. Required max cues to attend to task. RN provided medication during session. Objective:  Goals     Pt will tolerate recommended diet and regular solids with no overt s/s of aspiration, noticeable fatigue or significant pocketing.       - Pt tolerated all soft regular solids (blueberry muffin, peaches) thins via cup with no overt s/s of aspiration or noted difficulty. - RN coming in to give medications crushed in puree (as written on whiteboard in room). Previously recommended whole with water by evaluating SLP. Pt able to tolerate multiple pills provided 1 at a time whole with water via cup with no overt s/s of aspiration. Informed RN to continue to provided pills whole 1 at a time with water.   Pt tolerated single pill with water without GOAL MET (able to indicate wants)    Other areas targeted:     Education:   Ongoing re rationale for therapy. Pt unable to demonstrate comprehension. Ongoing re rationale for therapy. Pt unable to demonstrate comprehension. Safety Devices: [x] Call light within reach  [x] Chair alarm activated  [] Bed alarm activated  [] Other: [x] Call light within reach  [x] Chair alarm activated  [] Bed alarm activated  [] Other:      Assessment:   Speech Therapy Diagnosis  Cognitive Diagnosis: Further assessment warranted however demonstrated difficulty attending during evaluation, easily distracted by external stimuli in room, appeared grossly oriented via responses to f/2 verbal choices   Aphasia Diagnosis: Pt presents with moderate to severe expressive and receptive aphasia with suspected apraxia component. Pt with difficulty initiating verbalization and inconsistent errors during evaluation. Pt was limited in responses to yes/no questions and when following basic commands. Pt made few intelligible verbalizations in response to automatic questions. Paraphasias were evident during naming tasks. Current Diet Order: DIET DYSPHAGIA SOFT AND BITE-SIZED; Dietary Nutrition Supplements: Standard High Calorie Oral Supplement  * Assist feed/ set up *   Recommended Form of Meds: Whole with water(One at a time )  Compensatory Swallowing Strategies: Small bites/sips, Eat/Feed slowly, No straws, Upright as possible for all oral intake     Plan:     Frequency:  5days/week   60 minutes/day  Discharge Recommendations:   Barriers: Communication deficits; Awareness; Initiation;  Attention   Discharge Recommendations:  [] Home independently  [] Home with assistance [x]  24 hour supervision  [] SNF [] Other:  Continued SLP Treatment:  [x] Yes [] No [] TBD based on progress while on ARU [] Vital Stim indicated [] Other:   Estimated discharge date: 5/13/20    Type of Total Treatment Minutes   Session 1   Session 2   Time In [phone]

## 2020-05-02 LAB
HCT VFR BLD CALC: 36.9 % (ref 36–48)
HEMOGLOBIN: 12 G/DL (ref 12–16)
MCH RBC QN AUTO: 28.2 PG (ref 26–34)
MCHC RBC AUTO-ENTMCNC: 32.5 G/DL (ref 31–36)
MCV RBC AUTO: 86.6 FL (ref 80–100)
PDW BLD-RTO: 14.1 % (ref 12.4–15.4)
PLATELET # BLD: 466 K/UL (ref 135–450)
PMV BLD AUTO: 7.7 FL (ref 5–10.5)
RBC # BLD: 4.26 M/UL (ref 4–5.2)
WBC # BLD: 13.2 K/UL (ref 4–11)

## 2020-05-02 PROCEDURE — 51701 INSERT BLADDER CATHETER: CPT

## 2020-05-02 PROCEDURE — 1280000000 HC REHAB R&B

## 2020-05-02 PROCEDURE — 85027 COMPLETE CBC AUTOMATED: CPT

## 2020-05-02 PROCEDURE — 6370000000 HC RX 637 (ALT 250 FOR IP): Performed by: PHYSICAL MEDICINE & REHABILITATION

## 2020-05-02 PROCEDURE — 36415 COLL VENOUS BLD VENIPUNCTURE: CPT

## 2020-05-02 PROCEDURE — 51798 US URINE CAPACITY MEASURE: CPT

## 2020-05-02 PROCEDURE — 6360000002 HC RX W HCPCS: Performed by: PHYSICAL MEDICINE & REHABILITATION

## 2020-05-02 RX ORDER — ESCITALOPRAM OXALATE 10 MG/1
5 TABLET ORAL DAILY
Status: DISCONTINUED | OUTPATIENT
Start: 2020-05-03 | End: 2020-05-20 | Stop reason: HOSPADM

## 2020-05-02 RX ADMIN — TRAMADOL HYDROCHLORIDE 50 MG: 50 TABLET, FILM COATED ORAL at 22:33

## 2020-05-02 RX ADMIN — ENOXAPARIN SODIUM 40 MG: 40 INJECTION SUBCUTANEOUS at 09:35

## 2020-05-02 RX ADMIN — ASPIRIN 81 MG: 81 TABLET, COATED ORAL at 09:35

## 2020-05-02 RX ADMIN — FAMOTIDINE 20 MG: 20 TABLET ORAL at 09:35

## 2020-05-02 RX ADMIN — TAMSULOSIN HYDROCHLORIDE 0.4 MG: 0.4 CAPSULE ORAL at 09:35

## 2020-05-02 RX ADMIN — METHYLPHENIDATE HYDROCHLORIDE 5 MG: 10 TABLET ORAL at 12:27

## 2020-05-02 RX ADMIN — AMLODIPINE BESYLATE 10 MG: 5 TABLET ORAL at 09:35

## 2020-05-02 RX ADMIN — LOSARTAN POTASSIUM 100 MG: 100 TABLET, FILM COATED ORAL at 09:35

## 2020-05-02 RX ADMIN — FAMOTIDINE 20 MG: 20 TABLET ORAL at 22:33

## 2020-05-02 RX ADMIN — CALCIUM 500 MG: 500 TABLET ORAL at 09:35

## 2020-05-02 RX ADMIN — DESMOPRESSIN ACETATE 40 MG: 0.2 TABLET ORAL at 22:33

## 2020-05-02 RX ADMIN — METHYLPHENIDATE HYDROCHLORIDE 5 MG: 10 TABLET ORAL at 06:11

## 2020-05-02 RX ADMIN — CLOPIDOGREL 75 MG: 75 TABLET, FILM COATED ORAL at 09:35

## 2020-05-02 ASSESSMENT — PAIN SCALES - WONG BAKER
WONGBAKER_NUMERICALRESPONSE: 0

## 2020-05-02 ASSESSMENT — PAIN SCALES - GENERAL
PAINLEVEL_OUTOF10: 6
PAINLEVEL_OUTOF10: 0

## 2020-05-02 ASSESSMENT — PAIN DESCRIPTION - PAIN TYPE: TYPE: CHRONIC PAIN

## 2020-05-02 ASSESSMENT — PAIN DESCRIPTION - LOCATION: LOCATION: BACK

## 2020-05-02 NOTE — PLAN OF CARE
techniques  Outcome: Ongoing  Goal: LTG - patient will demonstrate appropriate skin care techniques  Outcome: Ongoing  Goal: LTG - Patient will be free from infection  Outcome: Ongoing  Goal: STG - patient will maintain good skin integrity  Outcome: Ongoing     Problem: PAIN  Goal: LTG - Patient will manage pain with the appropriate technique/Intervention  Outcome: Ongoing  Goal: STG - Patient will verbalize an acceptable level of pain  Outcome: Ongoing  Goal: STG - patient verbalizes a reduction in pain level  Outcome: Ongoing     Problem: Nutrition  Goal: Optimal nutrition therapy  Outcome: Ongoing

## 2020-05-02 NOTE — PLAN OF CARE
Problem: Falls - Risk of:  Goal: Will remain free from falls  Description: Will remain free from falls  5/2/2020 0127 by Karen Huertas RN  Outcome: Ongoing     Problem: Falls - Risk of:  Goal: Absence of physical injury  Description: Absence of physical injury  5/2/2020 0127 by Karen Huertas RN  Outcome: Ongoing     Problem: Pain:  Goal: Pain level will decrease  Description: Pain level will decrease  5/2/2020 0127 by Karen Huertas RN  Outcome: Ongoing     Problem: Pain:  Goal: Control of acute pain  Description: Control of acute pain  5/2/2020 0127 by Karen Huertas RN  Outcome: Ongoing     Problem: Pain:  Goal: Control of chronic pain  Description: Control of chronic pain  5/2/2020 0127 by Karen Huertas RN  Outcome: Ongoing     Problem: IP BLADDER/VOIDING  Goal: LTG - patient will complete bladder elimination  5/2/2020 0127 by Karen Huertas RN  Outcome: Ongoing     Problem: IP BLADDER/VOIDING  Goal: LTG - patient will achieve acceptable level of continence  5/2/2020 0127 by Karen Huertas RN  Outcome: Ongoing     Problem: IP BLADDER/VOIDING  Goal: STG - patient demonstrates self-cath technique using clean technique and care of the catheter  5/2/2020 0127 by Karen Huertas RN  Outcome: Ongoing     Problem: IP BLADDER/VOIDING  Goal: STG - Patient demonstrates no accidents  5/2/2020 0127 by Karen Huertas RN  Outcome: Ongoing     Problem: IP BOWEL ELIMINATION  Goal: STG - patient will be accident free  5/2/2020 0127 by Karen Huertas RN  Outcome: Ongoing     Problem: IP BOWEL ELIMINATION  Goal: STG - patient maintains skin integrity  5/2/2020 0127 by Karen Huertas RN  Outcome: Ongoing     Problem: NUTRITION  Goal: Patient maintains adequate hydration  5/2/2020 0127 by Karen Huertas RN  Outcome: Ongoing     Problem: SAFETY  Goal: LTG - patient will adhere to hip precautions during ADL's and transfers  5/2/2020 0127 by Karen Huertas RN  Outcome: Ongoing     Problem: SAFETY  Goal: LTG - Patient will demonstrate safety requirements appropriate to

## 2020-05-03 LAB
HCT VFR BLD CALC: 35.4 % (ref 36–48)
HEMOGLOBIN: 11.7 G/DL (ref 12–16)
MCH RBC QN AUTO: 28.3 PG (ref 26–34)
MCHC RBC AUTO-ENTMCNC: 33 G/DL (ref 31–36)
MCV RBC AUTO: 85.8 FL (ref 80–100)
PDW BLD-RTO: 13.9 % (ref 12.4–15.4)
PLATELET # BLD: 451 K/UL (ref 135–450)
PMV BLD AUTO: 8.1 FL (ref 5–10.5)
RBC # BLD: 4.13 M/UL (ref 4–5.2)
WBC # BLD: 12.8 K/UL (ref 4–11)

## 2020-05-03 PROCEDURE — 6370000000 HC RX 637 (ALT 250 FOR IP): Performed by: PHYSICAL MEDICINE & REHABILITATION

## 2020-05-03 PROCEDURE — 51701 INSERT BLADDER CATHETER: CPT

## 2020-05-03 PROCEDURE — 85027 COMPLETE CBC AUTOMATED: CPT

## 2020-05-03 PROCEDURE — 1280000000 HC REHAB R&B

## 2020-05-03 PROCEDURE — 6360000002 HC RX W HCPCS: Performed by: PHYSICAL MEDICINE & REHABILITATION

## 2020-05-03 PROCEDURE — 36415 COLL VENOUS BLD VENIPUNCTURE: CPT

## 2020-05-03 RX ADMIN — ENOXAPARIN SODIUM 40 MG: 40 INJECTION SUBCUTANEOUS at 08:46

## 2020-05-03 RX ADMIN — AMLODIPINE BESYLATE 10 MG: 5 TABLET ORAL at 08:46

## 2020-05-03 RX ADMIN — TAMSULOSIN HYDROCHLORIDE 0.4 MG: 0.4 CAPSULE ORAL at 08:47

## 2020-05-03 RX ADMIN — DESMOPRESSIN ACETATE 40 MG: 0.2 TABLET ORAL at 22:50

## 2020-05-03 RX ADMIN — ASPIRIN 81 MG: 81 TABLET, COATED ORAL at 08:47

## 2020-05-03 RX ADMIN — FAMOTIDINE 20 MG: 20 TABLET ORAL at 22:50

## 2020-05-03 RX ADMIN — ACETAMINOPHEN 650 MG: 325 TABLET, FILM COATED ORAL at 22:52

## 2020-05-03 RX ADMIN — CLOPIDOGREL 75 MG: 75 TABLET, FILM COATED ORAL at 08:47

## 2020-05-03 RX ADMIN — ESCITALOPRAM OXALATE 5 MG: 10 TABLET ORAL at 08:47

## 2020-05-03 RX ADMIN — METHYLPHENIDATE HYDROCHLORIDE 5 MG: 10 TABLET ORAL at 06:27

## 2020-05-03 RX ADMIN — FAMOTIDINE 20 MG: 20 TABLET ORAL at 08:47

## 2020-05-03 RX ADMIN — CALCIUM 500 MG: 500 TABLET ORAL at 08:48

## 2020-05-03 RX ADMIN — LOSARTAN POTASSIUM 100 MG: 100 TABLET, FILM COATED ORAL at 08:47

## 2020-05-03 RX ADMIN — METHYLPHENIDATE HYDROCHLORIDE 5 MG: 10 TABLET ORAL at 12:54

## 2020-05-03 ASSESSMENT — PAIN SCALES - GENERAL
PAINLEVEL_OUTOF10: 3
PAINLEVEL_OUTOF10: 0
PAINLEVEL_OUTOF10: 3

## 2020-05-03 ASSESSMENT — PAIN SCALES - WONG BAKER
WONGBAKER_NUMERICALRESPONSE: 0
WONGBAKER_NUMERICALRESPONSE: 0

## 2020-05-03 ASSESSMENT — PAIN DESCRIPTION - LOCATION: LOCATION: BACK

## 2020-05-03 ASSESSMENT — PAIN DESCRIPTION - DESCRIPTORS: DESCRIPTORS: PATIENT UNABLE TO DESCRIBE

## 2020-05-03 ASSESSMENT — PAIN DESCRIPTION - PAIN TYPE: TYPE: CHRONIC PAIN

## 2020-05-04 LAB
ANION GAP SERPL CALCULATED.3IONS-SCNC: 9 MMOL/L (ref 3–16)
BUN BLDV-MCNC: 25 MG/DL (ref 7–20)
CALCIUM SERPL-MCNC: 9.3 MG/DL (ref 8.3–10.6)
CHLORIDE BLD-SCNC: 99 MMOL/L (ref 99–110)
CO2: 27 MMOL/L (ref 21–32)
CREAT SERPL-MCNC: 0.6 MG/DL (ref 0.6–1.2)
GFR AFRICAN AMERICAN: >60
GFR NON-AFRICAN AMERICAN: >60
GLUCOSE BLD-MCNC: 102 MG/DL (ref 70–99)
HCT VFR BLD CALC: 33.9 % (ref 36–48)
HEMOGLOBIN: 11.4 G/DL (ref 12–16)
MCH RBC QN AUTO: 29.1 PG (ref 26–34)
MCHC RBC AUTO-ENTMCNC: 33.6 G/DL (ref 31–36)
MCV RBC AUTO: 86.5 FL (ref 80–100)
PDW BLD-RTO: 14.2 % (ref 12.4–15.4)
PLATELET # BLD: 432 K/UL (ref 135–450)
PMV BLD AUTO: 8.4 FL (ref 5–10.5)
POTASSIUM SERPL-SCNC: 3.5 MMOL/L (ref 3.5–5.1)
RBC # BLD: 3.92 M/UL (ref 4–5.2)
SODIUM BLD-SCNC: 135 MMOL/L (ref 136–145)
WBC # BLD: 10.2 K/UL (ref 4–11)

## 2020-05-04 PROCEDURE — 97530 THERAPEUTIC ACTIVITIES: CPT

## 2020-05-04 PROCEDURE — 92526 ORAL FUNCTION THERAPY: CPT

## 2020-05-04 PROCEDURE — 51798 US URINE CAPACITY MEASURE: CPT

## 2020-05-04 PROCEDURE — 97535 SELF CARE MNGMENT TRAINING: CPT

## 2020-05-04 PROCEDURE — 6360000002 HC RX W HCPCS: Performed by: PHYSICAL MEDICINE & REHABILITATION

## 2020-05-04 PROCEDURE — 6370000000 HC RX 637 (ALT 250 FOR IP): Performed by: UROLOGY

## 2020-05-04 PROCEDURE — 97116 GAIT TRAINING THERAPY: CPT

## 2020-05-04 PROCEDURE — 97129 THER IVNTJ 1ST 15 MIN: CPT

## 2020-05-04 PROCEDURE — 6370000000 HC RX 637 (ALT 250 FOR IP): Performed by: PHYSICAL MEDICINE & REHABILITATION

## 2020-05-04 PROCEDURE — 51701 INSERT BLADDER CATHETER: CPT

## 2020-05-04 PROCEDURE — 97130 THER IVNTJ EA ADDL 15 MIN: CPT

## 2020-05-04 PROCEDURE — 1280000000 HC REHAB R&B

## 2020-05-04 PROCEDURE — 92507 TX SP LANG VOICE COMM INDIV: CPT

## 2020-05-04 PROCEDURE — 85027 COMPLETE CBC AUTOMATED: CPT

## 2020-05-04 PROCEDURE — 80048 BASIC METABOLIC PNL TOTAL CA: CPT

## 2020-05-04 RX ORDER — BETHANECHOL CHLORIDE 10 MG/1
10 TABLET ORAL 3 TIMES DAILY
Status: DISCONTINUED | OUTPATIENT
Start: 2020-05-04 | End: 2020-05-17

## 2020-05-04 RX ADMIN — CALCIUM 500 MG: 500 TABLET ORAL at 09:55

## 2020-05-04 RX ADMIN — CLOPIDOGREL 75 MG: 75 TABLET, FILM COATED ORAL at 09:55

## 2020-05-04 RX ADMIN — FAMOTIDINE 20 MG: 20 TABLET ORAL at 09:55

## 2020-05-04 RX ADMIN — DESMOPRESSIN ACETATE 40 MG: 0.2 TABLET ORAL at 22:20

## 2020-05-04 RX ADMIN — TAMSULOSIN HYDROCHLORIDE 0.4 MG: 0.4 CAPSULE ORAL at 09:55

## 2020-05-04 RX ADMIN — ASPIRIN 81 MG: 81 TABLET, COATED ORAL at 09:55

## 2020-05-04 RX ADMIN — ESCITALOPRAM OXALATE 5 MG: 10 TABLET ORAL at 09:55

## 2020-05-04 RX ADMIN — BETHANECHOL CHLORIDE 10 MG: 10 TABLET ORAL at 22:20

## 2020-05-04 RX ADMIN — METHYLPHENIDATE HYDROCHLORIDE 5 MG: 10 TABLET ORAL at 06:53

## 2020-05-04 RX ADMIN — BETHANECHOL CHLORIDE 10 MG: 10 TABLET ORAL at 16:35

## 2020-05-04 RX ADMIN — LOSARTAN POTASSIUM 100 MG: 100 TABLET, FILM COATED ORAL at 09:55

## 2020-05-04 RX ADMIN — METHYLPHENIDATE HYDROCHLORIDE 5 MG: 10 TABLET ORAL at 12:24

## 2020-05-04 RX ADMIN — FAMOTIDINE 20 MG: 20 TABLET ORAL at 22:20

## 2020-05-04 RX ADMIN — ENOXAPARIN SODIUM 40 MG: 40 INJECTION SUBCUTANEOUS at 09:55

## 2020-05-04 RX ADMIN — AMLODIPINE BESYLATE 10 MG: 5 TABLET ORAL at 09:55

## 2020-05-04 ASSESSMENT — PAIN SCALES - WONG BAKER
WONGBAKER_NUMERICALRESPONSE: 0

## 2020-05-04 ASSESSMENT — PAIN SCALES - GENERAL
PAINLEVEL_OUTOF10: 0

## 2020-05-04 NOTE — CONSULTS
Urology Consult Note  [facility]     Patient: Benoit Peñaloza MRN: [id number]  Room/Bed: [place] 522756/9595-97   YOB: 1934  Age/Sex: 80 y. o.female  Admission Date: 4/20/2020     Date of Service:  5/4/2020    Consulting Physician: Jonna Tsang  Admitting/Requesting Physician: Saad Mcguire MD  Primary Care Physician: Evelyn Sinha MD    Reason for Consult: Acute urinary retention after CVA    ASSESSMENT/PLAN     Urinary retention after CVA  UTI - Proteus 4/24 urine culture - tx with abx fu UA negative  - requiring intermittent cath (large residuals 400-800cc); no volitional voiding in between caths  - on flomax  - no prior significant urologic history    Recommendations:  Continue intermittent catheterization as needed for residuals greater than 400 to 500 cc on bladder scan  Continue Flomax and add Urecholine (ordered) - may provide some benefit  We will need close outpatient follow-up as recovers from stroke to manage her bladder long-term, will consider urodynamics and other work-up as an outpatient    All the patients questions were answered. She understands the plan as listed above. HISTORY     Chief Complaint: Urinary retention    History of Present Illness: Benoit Peñaloza is a 80 y.o. female with retention. Onset of symptoms was gradual with stable course since that time. Symptoms are aggravated by cath. Symptoms improved with cath. Associated symptoms include none. Patient also reports stroke--PT/OT ongoing. She has tried the following treatments: cath, prior UTI with completion of abx. Past Medical History:  She has a past medical history of Arthritis, CAD (coronary artery disease), GERD (gastroesophageal reflux disease), blood clots, Hyperlipidemia, and Hypertension. Past Surgical History:  She has a past surgical history that includes Cholecystectomy; Appendectomy; Varicose vein surgery;  Endoscopy, colon, diagnostic; eye surgery; joint replacement; and Breast surgery. Allergies: Allergies   Allergen Reactions    Amoxicillin Hives    Bupivacaine Hcl Shortness Of Breath     Had a reaction after an injection of omnitaque, lidocaine, sensorcaine and kenalog. She isn't sure what caused her problems.  Lidocaine Shortness Of Breath, Other (See Comments), Nausea Only and Swelling     Received this during her RODRICK at same time as Kenalog so unknown what she reacted to. Had a reaction after an injection of omnitaque, lidocaine, sensorcaine and kenalog. She isn't sure what caused her problems.  Iodides     Ketamine     Lisinopril     Sulfa Antibiotics Nausea Only     gastritis      Triamcinolone Swelling    Iohexol Nausea And Vomiting and Swelling     Had back injection and had reaction. The other drugs included with injection are Lidocaine, sensorcaine, and kenalog. Social History:  She reports that she has never smoked. She has never used smokeless tobacco. She reports that she does not drink alcohol or use drugs. Family History:  family history is not on file. Medications:  Scheduled Meds:   escitalopram  5 mg Oral Daily    tamsulosin  0.4 mg Oral Daily    methylphenidate  5 mg Oral BID AC    losartan  100 mg Oral Daily    amLODIPine  10 mg Oral Daily    aspirin  81 mg Oral Daily    atorvastatin  40 mg Oral Nightly    calcium elemental  500 mg Oral Daily    clopidogrel  75 mg Oral Daily    enoxaparin  40 mg Subcutaneous Daily    famotidine  20 mg Oral BID     Continuous Infusions:  PRN Meds:acetaminophen, diphenhydrAMINE, hydrALAZINE, ondansetron, traZODone, traMADol    Review of Systems:  Pertinent positives/negatives reviewed in HPI. All other systems reviewed and negative, unless noted below.     Constitutional: Negative  Genitourinary: +dysuria prev prior to abx, unable to void see HPI  HEENT: Negative   Cardiovascular: Negative   Respiratory: Negative   Gastrointestinal: Negative   Musculoskeletal: R hemiparesis reasonably achievable. Noncontrast CT of the head with reconstructed 2-D images are also provided for review. COMPARISON: None HISTORY: ORDERING SYSTEM PROVIDED HISTORY: R leg weakness TECHNOLOGIST PROVIDED HISTORY: Reason for exam:->R leg weakness Has a \"code stroke\" or \"stroke alert\" been called? ->Yes Reason for Exam: R leg weakness Acuity: Unknown Type of Exam: Unknown Dizziness. Posterior circulation issues. FINDINGS: CT HEAD: BRAIN/VENTRICLES:  The cerebral and cerebellar parenchyma demonstrate volume loss. Scattered and confluent low-attenuation areas are noted supratentorially, compatible with severe chronic microvascular white matter ischemic disease. There are no areas of acute hemorrhage, mass, or midline shift. There are no abnormal extra-axial fluid collections. The ventricles are proportional to the cerebral sulci. Calcifications are noted along the carotid siphons. Gray-white differentiation is maintained without evidence of an acute infarct. ORBITS: Lens implants from prior cataract surgery are noted. The orbits are otherwise unremarkable. Arcus senilis is noted. SINUSES:  There are thickened secretions in the left sphenoid sinus. The rest of the visualized paranasal sinuses and mastoid air cells are clear. SOFT TISSUES/SKULL: The calvarium is intact. No appreciable scalp soft tissue swelling. 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 The innominate and subclavian arteries are patent. CAROTID ARTERIES: The arteries in the neck are tortuous, likely related to hypertension. The common carotid and internal carotid arteries are patent without evidence of a flow-limiting stenosis using NASCET criteria. Fibrocalcific plaque is noted in bilateral carotid bulbs. VERTEBRAL ARTERIES: There is fibrocalcific plaque noted at the right vertebral artery origin resulting in a moderate stenosis.   The right vertebral artery is

## 2020-05-04 NOTE — PROGRESS NOTES
2nd and third trial with MIRIAN platform walker and min A of 2 persons plus close w/c follow - this therapist assisting with steering walker and verbal cuing for upright posture   Toilet Transfers  Toilet - Technique: Stand pivot  Equipment Used: Standard bedside commode  Toilet Transfer: Dependent/Total  Toilet Transfers Comments: mod A of 1/min A of another   Shower Transfers  Shower Transfers Comments: BSC in shower, see above      Transfers  Stand Pivot Transfers: Dependent/Total;2 Person assistance         Plan   Plan  Times per week: 60 minutes 5 days per week   Times per day: Daily  Specific instructions for Next Treatment: continue with cotx   Current Treatment Recommendations: Functional Mobility Training, Endurance Training, Safety Education & Training, Self-Care / ADL, Home Management Training       Goals  Short term goals  Time Frame for Short term goals: 1 week   Short term goal 1: mod A of 1 functional transfers- dependent (max A of 1, mod A of 1) 4/23  Short term goal 2: mod A of 1 UB bathing/dressing- max A 4/23  Short term goal 3: max A of 1 LB bathing/dressing- max A to dependent 4/23  Long term goals  Time Frame for Long term goals : 3 weeks   Long term goal 1: CGA functional transfers   Long term goal 2: min A UB bathing/dressing, set up grooming  Long term goal 3: mod A LB bathing/dressing and toileting   Long term goal 4: w/c mobility for ADL and IADL tasks mod I   Long term goal 5: min A simple IADLs from w/c level   Patient Goals   Patient goals : does not state       Therapy Time   Individual Concurrent Group Co-treatment   Time In       0830   Time Out       0930   Minutes       60   Timed Code Treatment Minutes: 60 Minutes   Total minutes 60    Leo Liu, OT   Leo Amador OTR/L [id number], 5/4/2020, 10:16 AM

## 2020-05-04 NOTE — PLAN OF CARE
Problem: Falls - Risk of:  Goal: Will remain free from falls  Description: Will remain free from falls  5/3/2020 2256 by Ivette River RN  Outcome: Ongoing     Problem: Falls - Risk of:  Goal: Absence of physical injury  Description: Absence of physical injury  5/3/2020 2256 by Ivette River RN  Outcome: Ongoing     Problem: Pain:  Goal: Pain level will decrease  Description: Pain level will decrease  5/3/2020 2256 by Ivette River RN  Outcome: Ongoing     Problem: Pain:  Goal: Control of acute pain  Description: Control of acute pain  5/3/2020 2256 by Ivette River RN  Outcome: Ongoing     Problem: Pain:  Goal: Control of chronic pain  Description: Control of chronic pain  5/3/2020 2256 by Ivette River RN  Outcome: Ongoing     Problem: IP BLADDER/VOIDING  Goal: STG - patient demonstrates self-cath technique using clean technique and care of the catheter  5/3/2020 2256 by Ivette River RN  Outcome: Ongoing     Problem: IP BLADDER/VOIDING  Goal: STG - Patient demonstrates no accidents  5/3/2020 2256 by Ivette River RN  Outcome: Ongoing     Problem: NUTRITION  Goal: Patient maintains adequate hydration  5/3/2020 2256 by Ivette River RN  Outcome: Ongoing     Problem: SAFETY  Goal: STG - Patient uses call light consistently to request assistance with transfers  5/3/2020 2256 by Ivette River RN  Outcome: Ongoing     Problem: SKIN INTEGRITY  Goal: STG - patient will maintain good skin integrity  5/3/2020 2256 by Ivette River RN  Outcome: Ongoing     Problem: PAIN  Goal: LTG - Patient will manage pain with the appropriate technique/Intervention  5/3/2020 2256 by Ivette River RN  Outcome: Ongoing     Problem: PAIN  Goal: STG - Patient will verbalize an acceptable level of pain  5/3/2020 2256 by Ivette River RN  Outcome: Ongoing

## 2020-05-04 NOTE — PROGRESS NOTES
General  Chart Reviewed: Yes  Additional Pertinent Hx: CAD, DVT, R TSA, HTN  Response To Previous Treatment: Patient with no complaints from previous session. Family / Caregiver Present: No  Subjective  Subjective: No c/o pain; agreeable to PT/OT  General Comment  Comments: Pt up in recliner, eating breakfast  Pain Screening  Patient Currently in Pain: Denies       Orientation  Orientation  Overall Orientation Status: Within Functional Limits      Objective   Transfers  Sit to Stand: Moderate Assistance  Stand to sit: Moderate Assistance  Stand Pivot Transfers: 2 Person Assistance(Mod A and Min A)  Lateral Transfers: 2 Person Assistance(Mod A and Min A)    Ambulation 1  Surface: level tile  Device: Parallel Bars  Assistance: 2 Person assistance  Quality of Gait: Single step commands, narrow VIMAL; PT providing physical and verbal cue to widen VIMAL  Distance: 8' x 1    Ambulation 2  Surface - 2: level tile  Device 2: Rolling Walker(MIRIAN walker)  Assistance 2: 2 Person assistance(MIn A 2)  Quality of Gait 2: Downward gaze, mildly flexed trunk, narrow VIMAL, occasionally crossing midline each LE R> L. Distance: 37' x 1, 46' x 1  Comment: Co-treat for safety and to maximize function. Stand-pivot to w/c. Stand-pivot to toilet. Incontinent of stool in shower. Performed grooming from W/C level. Ambulation in parallel bars progressing to MIRIAN walker as above.       Goals  Short term goals  Time Frame for Short term goals: 1 week  Short term goal 1: Pt will perform bed mobilty with Mod A 1  Short term goal 2: Pt will transfer bed to chair with Mod A 2: goal met  Short term goal 3: Pt will tolerate sitting EOB for functional task x 5 minutes with CGA: goal met  Short term goal 4: Pt will  parallel bars with Mod A 2: goal met  Long term goals  Time Frame for Long term goals : 3 weeks  Long term goal 1: Pt will be mod I for bed mobility  Long term goal 2: Pt will transfer bed to chair with CGA  Long term goal 3: Pt will propel w/c 48' with Mod I  Long term goal 4: Pt will ambulate 22' with AAD and CGA  Long term goal 5: Pt will ascend/descend ramp with Min A  Patient Goals   Patient goals : None stated    Plan    Plan  Times per week: 60 minutes on 5 days/week  Plan weeks: 3-4 weeks  Current Treatment Recommendations: Strengthening, ROM, Balance Training, Functional Mobility Training, Transfer Training, Endurance Training, Wheelchair Mobility Training, Gait Training, Stair training, Neuromuscular Re-education, Pain Management, Safety Education & Training, Home Exercise Program, Patient/Caregiver Education & Training, Equipment Evaluation, Education, & procurement, Modalities, Positioning  Safety Devices  Type of devices: Left in chair, Telesitter in use, Call light within reach, Chair alarm in place  Restraints  Initially in place: No     Therapy Time   Individual Concurrent Group Co-treatment   Time In       0830   Time Out       0930   Minutes       60         Timed code treatment minutes: 60    Total treatment minutes: Christopher 85,  C/NDT  Lenore Burns, PT

## 2020-05-04 NOTE — PROGRESS NOTES
heart disease)    Ataxia    Acute ischemic stroke ([facility] [address]       Plan:   Acute ischemic infarcts: ASA, plavix, statin. PT/OT for right hemiparesis. SLP for cognition. Increased stroke burden and decline in physical status upon initial admission to ARU. Neuro evaluated and suggested continued DAPT Tx. Started ritalin low-dose for stimulation. Started lexapro for motor recovery.      SHIELA stenosis: as above     HTN: norvasc 10, losartan 100     HLD: lipitor 40     GERD: pepcid 20    Proteus UTI: Cipro completed, ICs as needed    Leukocytosis: UA and CXR negative, - resolved    Urinary retention: started flomax, - not improved, urology consult.     Bowels: Per protocol  Bladder: Per protocol   Sleep: Trazodone provided prn. Pain: tylenol, tramadol PRN   DVT PPx: Lovenox     Mabel Wagner MD 5/4/2020, 9:00 AM    * This document was created using dictation software. While all precautions were taken to ensure accuracy, errors may have occurred. Please disregard any typographical errors.

## 2020-05-05 PROCEDURE — 1280000000 HC REHAB R&B

## 2020-05-05 PROCEDURE — 97535 SELF CARE MNGMENT TRAINING: CPT

## 2020-05-05 PROCEDURE — 6370000000 HC RX 637 (ALT 250 FOR IP): Performed by: UROLOGY

## 2020-05-05 PROCEDURE — 6370000000 HC RX 637 (ALT 250 FOR IP): Performed by: PHYSICAL MEDICINE & REHABILITATION

## 2020-05-05 PROCEDURE — 97129 THER IVNTJ 1ST 15 MIN: CPT

## 2020-05-05 PROCEDURE — 97530 THERAPEUTIC ACTIVITIES: CPT

## 2020-05-05 PROCEDURE — 97116 GAIT TRAINING THERAPY: CPT

## 2020-05-05 PROCEDURE — 51701 INSERT BLADDER CATHETER: CPT

## 2020-05-05 PROCEDURE — 6360000002 HC RX W HCPCS: Performed by: PHYSICAL MEDICINE & REHABILITATION

## 2020-05-05 PROCEDURE — 51798 US URINE CAPACITY MEASURE: CPT

## 2020-05-05 PROCEDURE — 97130 THER IVNTJ EA ADDL 15 MIN: CPT

## 2020-05-05 RX ADMIN — LOSARTAN POTASSIUM 100 MG: 100 TABLET, FILM COATED ORAL at 08:56

## 2020-05-05 RX ADMIN — ESCITALOPRAM OXALATE 5 MG: 10 TABLET ORAL at 08:56

## 2020-05-05 RX ADMIN — METHYLPHENIDATE HYDROCHLORIDE 5 MG: 10 TABLET ORAL at 12:02

## 2020-05-05 RX ADMIN — ASPIRIN 81 MG: 81 TABLET, COATED ORAL at 08:56

## 2020-05-05 RX ADMIN — FAMOTIDINE 20 MG: 20 TABLET ORAL at 08:56

## 2020-05-05 RX ADMIN — BETHANECHOL CHLORIDE 10 MG: 10 TABLET ORAL at 14:43

## 2020-05-05 RX ADMIN — AMLODIPINE BESYLATE 10 MG: 5 TABLET ORAL at 08:56

## 2020-05-05 RX ADMIN — ENOXAPARIN SODIUM 40 MG: 40 INJECTION SUBCUTANEOUS at 08:56

## 2020-05-05 RX ADMIN — BETHANECHOL CHLORIDE 10 MG: 10 TABLET ORAL at 22:53

## 2020-05-05 RX ADMIN — FAMOTIDINE 20 MG: 20 TABLET ORAL at 22:53

## 2020-05-05 RX ADMIN — TAMSULOSIN HYDROCHLORIDE 0.4 MG: 0.4 CAPSULE ORAL at 08:56

## 2020-05-05 RX ADMIN — CALCIUM 500 MG: 500 TABLET ORAL at 08:56

## 2020-05-05 RX ADMIN — CLOPIDOGREL 75 MG: 75 TABLET, FILM COATED ORAL at 08:56

## 2020-05-05 RX ADMIN — METHYLPHENIDATE HYDROCHLORIDE 5 MG: 10 TABLET ORAL at 06:18

## 2020-05-05 RX ADMIN — BETHANECHOL CHLORIDE 10 MG: 10 TABLET ORAL at 08:56

## 2020-05-05 RX ADMIN — DESMOPRESSIN ACETATE 40 MG: 0.2 TABLET ORAL at 22:53

## 2020-05-05 ASSESSMENT — PAIN SCALES - WONG BAKER
WONGBAKER_NUMERICALRESPONSE: 0

## 2020-05-05 ASSESSMENT — PAIN SCALES - GENERAL: PAINLEVEL_OUTOF10: 0

## 2020-05-05 NOTE — PROGRESS NOTES
Plan:     Frequency:  5days/week   60 minutes/day  Discharge Recommendations:   Barriers: Communication deficits; Awareness; Initiation; Attention   Discharge Recommendations:  [] Home independently  [] Home with assistance [x]  24 hour supervision  [] SNF [] Other:  Continued SLP Treatment:  [x] Yes [] No [] TBD based on progress while on ARU [] Vital Stim indicated [] Other:   Estimated discharge date: 5/13/20    Type of Total Treatment Minutes   Session 1   Session 2   Time In 0800 1030   Time Out 0830  1100   Timed Code Minutes  30 30   Individual Treatment Minutes  30 30   Co-Treatment Minutes      Group Treatment Minutes      Concurrent Treatment Minutes        TOTAL DAILY MINUTES:  60     Electronically Signed by     Ellie Ellsworth M.A.  [facility]-SLP S.PMaximiliano [id number]  Speech-Language Pathologist [phone]  5/5/2020 9:13 AM

## 2020-05-05 NOTE — PROGRESS NOTES
Physical Therapy  Facility/Department: [facility] ACUTE REHAB UNIT  Daily Treatment Note  NAME: Benoit Peñaloza  : 1934  MRN: [id number]    Date of Service: 2020    Discharge Recommendations:  Patient would benefit from continued therapy after discharge, S Level 3, 24 hour supervision or assist   PT Equipment Recommendations  Other: Has W/c and RW at home; additional needs to be addressed based on progress    Assessment   Body structures, Functions, Activity limitations: Decreased functional mobility ; Decreased ROM; Decreased strength;Decreased ADL status; Decreased safe awareness;Decreased cognition;Decreased endurance;Decreased sensation;Decreased balance;Decreased fine motor control;Decreased coordination;Decreased posture  Assessment: Appears mildly confused, slower to follow one step commands. Limited by nausea. Treatment Diagnosis: RLE weakness, impaired balance, ambulation deficits  Prognosis: Good;Fair  Patient Education:  & : Transfers, midline alignment. Pt need reinforcement.  - : Transfers. Pt able to follow one step commands, needs reinforcement. : Gait - needs single step commands and reinforcement  Barriers to Learning: Cognition,communication  Activity Tolerance  Activity Tolerance: Patient limited by fatigue  Activity Tolerance: Limited by c/o nausea     Patient Diagnosis(es): Stroke   has a past medical history of Arthritis, CAD (coronary artery disease), GERD (gastroesophageal reflux disease), Hx of blood clots, Hyperlipidemia, and Hypertension. has a past surgical history that includes Cholecystectomy; Appendectomy; Varicose vein surgery; Endoscopy, colon, diagnostic; eye surgery; joint replacement; and Breast surgery. Restrictions  Restrictions/Precautions  Restrictions/Precautions: Fall Risk  Required Braces or Orthoses?: No  Position Activity Restriction  Other position/activity restrictions:  Benoit Peñaloza is a 80 y.o. female who presented to the emergency department  for evaluation for right leg weakness. The patient has a history of hypertension and hyperlipidemia. To ARU 4/20/20  Subjective   General  Chart Reviewed: Yes  Additional Pertinent Hx: CAD, DVT, R TSA, HTN  Response To Previous Treatment: Patient with no complaints from previous session. Family / Caregiver Present: No  Subjective  Subjective: C/o nausea throughout session; asks several times to go back to bed  General Comment  Comments: Pt in bed with meal in front of her. Pain Screening  Patient Currently in Pain: Denies       Orientation  Orientation  Overall Orientation Status: Impaired     Objective   Bed mobility  Rolling to Left: Moderate assistance  Rolling to Right: Moderate assistance  Supine to Sit: Moderate assistance  Sit to Supine: Moderate assistance  Scooting: Moderate assistance     Transfers  Sit to Stand: Moderate Assistance  Stand to sit: Moderate Assistance  Bed to Chair: 2 Person Assistance(Mod A and Min A1)  Stand Pivot Transfers: 2 Person Assistance  Lateral Transfers: 2 Person Assistance(Mod A and Min A)     Comment: Co-treat for safety and to maximize function. Performed seated EOB self feed with set up and min-CGA for sitting balance. Meal limited by c/o nausea and need to toilet. Stand-pivot to w/c. Stand-pivot to toilet. Stood with mod A while 2nd person performed otf care. Performed seated dressing and grooming from W/C level. Pt requested to return to bed and did not feel she could continue session. Returned to bed and positioned for comfort. MOHSEN Heredia was notifed. 2nd session: Pt in bed, eyes closed. Pt's  and granddaughter at bedside. Pt is agreeable to PT/OT. Rolls to R with Mod A. Supine to sit with Mod A 1. Transferred bed to w/c with Mod A and Min A. Ambulated 8' in parallel bars with Mod A 1 and CGA. Progressed to MIRIAN walker with Min A 2 with verbal and TC to widen VIMAL and assist to advance RW.   Pt left up in w/c with family and Chair alarm

## 2020-05-05 NOTE — PROGRESS NOTES
Transfers: Dependent/Total;2 Person assistance(mod A of 1 and min A of 1 )       Pt in bed on arrival for making up of previous missed minutes - mod A supine to sit - mod A of 1/min A of 1 SPT to L to w/c - ambulation in II bars mod A of 1 and CGA of 1 x 8 feet - MIRIAN platform walker 52 feet x 1 with min A of 2 persons, this therapist assisting in steering walker - pt in chair with alarm engaged and needs in reach, family present visiting ( and granddaughter)   Plan   Plan  Times per week: 60 minutes 5 days per week   Times per day: Daily  Specific instructions for Next Treatment: continue with cotx   Current Treatment Recommendations: Functional Mobility Training, Endurance Training, Safety Education & Training, Self-Care / ADL, Home Management Training       Goals  Short term goals  Time Frame for Short term goals: 1 week   Short term goal 1: mod A of 1 functional transfers- dependent (max A of 1, mod A of 1) 4/23  Short term goal 2: mod A of 1 UB bathing/dressing- max A 4/23  Short term goal 3: max A of 1 LB bathing/dressing- max A to dependent 4/23  Long term goals  Time Frame for Long term goals : 3 weeks   Long term goal 1: CGA functional transfers   Long term goal 2: min A UB bathing/dressing, set up grooming  Long term goal 3: mod A LB bathing/dressing and toileting   Long term goal 4: w/c mobility for ADL and IADL tasks mod I   Long term goal 5: min A simple IADLs from w/c level   Patient Goals   Patient goals : does not state       Therapy Time   Individual Concurrent Group Co-treatment   Time In       0915   Time Out       0957   Minutes       42   Second Session Therapy Time:   Individual Concurrent Group Co-treatment   Time In       1113   Time Out       1132   Minutes       19        Timed Code Treatment Minutes: 42 Minutes + 19    Total minutes 61    Leo Beasley, OT   Leo Bee OTR/L [id number], 5/5/2020, 10:10 AM

## 2020-05-05 NOTE — CARE COORDINATION
Spoke with Nikki hurst to discuss pt's progress and discharge options of going home with private duty home care vs. AL. Also discussed SNF stay if pt isn't able to progress to those independence levels along with going home at wheelchair/suma lift level d/t pt and spouse benefiting from being together during pandemic. All questions answered and support provided.  Lashae Carter, MSW, LSW

## 2020-05-06 LAB
ANION GAP SERPL CALCULATED.3IONS-SCNC: 10 MMOL/L (ref 3–16)
BUN BLDV-MCNC: 22 MG/DL (ref 7–20)
CALCIUM SERPL-MCNC: 9.4 MG/DL (ref 8.3–10.6)
CHLORIDE BLD-SCNC: 98 MMOL/L (ref 99–110)
CO2: 30 MMOL/L (ref 21–32)
CREAT SERPL-MCNC: 0.5 MG/DL (ref 0.6–1.2)
GFR AFRICAN AMERICAN: >60
GFR NON-AFRICAN AMERICAN: >60
GLUCOSE BLD-MCNC: 108 MG/DL (ref 70–99)
HCT VFR BLD CALC: 34.3 % (ref 36–48)
HEMOGLOBIN: 11.5 G/DL (ref 12–16)
MCH RBC QN AUTO: 28.6 PG (ref 26–34)
MCHC RBC AUTO-ENTMCNC: 33.6 G/DL (ref 31–36)
MCV RBC AUTO: 85 FL (ref 80–100)
PDW BLD-RTO: 13.7 % (ref 12.4–15.4)
PLATELET # BLD: 541 K/UL (ref 135–450)
PMV BLD AUTO: 7.7 FL (ref 5–10.5)
POTASSIUM SERPL-SCNC: 4 MMOL/L (ref 3.5–5.1)
RBC # BLD: 4.04 M/UL (ref 4–5.2)
SODIUM BLD-SCNC: 138 MMOL/L (ref 136–145)
WBC # BLD: 7.3 K/UL (ref 4–11)

## 2020-05-06 PROCEDURE — 97112 NEUROMUSCULAR REEDUCATION: CPT

## 2020-05-06 PROCEDURE — 92526 ORAL FUNCTION THERAPY: CPT

## 2020-05-06 PROCEDURE — 97130 THER IVNTJ EA ADDL 15 MIN: CPT

## 2020-05-06 PROCEDURE — 36415 COLL VENOUS BLD VENIPUNCTURE: CPT

## 2020-05-06 PROCEDURE — 97129 THER IVNTJ 1ST 15 MIN: CPT

## 2020-05-06 PROCEDURE — 6360000002 HC RX W HCPCS: Performed by: PHYSICAL MEDICINE & REHABILITATION

## 2020-05-06 PROCEDURE — 6370000000 HC RX 637 (ALT 250 FOR IP): Performed by: UROLOGY

## 2020-05-06 PROCEDURE — 51798 US URINE CAPACITY MEASURE: CPT

## 2020-05-06 PROCEDURE — 97116 GAIT TRAINING THERAPY: CPT

## 2020-05-06 PROCEDURE — 97535 SELF CARE MNGMENT TRAINING: CPT

## 2020-05-06 PROCEDURE — 51701 INSERT BLADDER CATHETER: CPT

## 2020-05-06 PROCEDURE — 97530 THERAPEUTIC ACTIVITIES: CPT

## 2020-05-06 PROCEDURE — 1280000000 HC REHAB R&B

## 2020-05-06 PROCEDURE — 85027 COMPLETE CBC AUTOMATED: CPT

## 2020-05-06 PROCEDURE — 80048 BASIC METABOLIC PNL TOTAL CA: CPT

## 2020-05-06 PROCEDURE — 6370000000 HC RX 637 (ALT 250 FOR IP): Performed by: PHYSICAL MEDICINE & REHABILITATION

## 2020-05-06 PROCEDURE — 92507 TX SP LANG VOICE COMM INDIV: CPT

## 2020-05-06 RX ADMIN — METHYLPHENIDATE HYDROCHLORIDE 5 MG: 10 TABLET ORAL at 05:41

## 2020-05-06 RX ADMIN — FAMOTIDINE 20 MG: 20 TABLET ORAL at 10:15

## 2020-05-06 RX ADMIN — BETHANECHOL CHLORIDE 10 MG: 10 TABLET ORAL at 23:02

## 2020-05-06 RX ADMIN — DESMOPRESSIN ACETATE 40 MG: 0.2 TABLET ORAL at 23:02

## 2020-05-06 RX ADMIN — LOSARTAN POTASSIUM 100 MG: 100 TABLET, FILM COATED ORAL at 10:16

## 2020-05-06 RX ADMIN — CALCIUM 500 MG: 500 TABLET ORAL at 10:15

## 2020-05-06 RX ADMIN — TAMSULOSIN HYDROCHLORIDE 0.4 MG: 0.4 CAPSULE ORAL at 10:15

## 2020-05-06 RX ADMIN — BETHANECHOL CHLORIDE 10 MG: 10 TABLET ORAL at 12:14

## 2020-05-06 RX ADMIN — BETHANECHOL CHLORIDE 10 MG: 10 TABLET ORAL at 10:16

## 2020-05-06 RX ADMIN — CLOPIDOGREL 75 MG: 75 TABLET, FILM COATED ORAL at 10:15

## 2020-05-06 RX ADMIN — TRAMADOL HYDROCHLORIDE 50 MG: 50 TABLET, FILM COATED ORAL at 23:32

## 2020-05-06 RX ADMIN — AMLODIPINE BESYLATE 10 MG: 5 TABLET ORAL at 10:16

## 2020-05-06 RX ADMIN — ASPIRIN 81 MG: 81 TABLET, COATED ORAL at 10:15

## 2020-05-06 RX ADMIN — TRAZODONE HYDROCHLORIDE 50 MG: 50 TABLET ORAL at 23:03

## 2020-05-06 RX ADMIN — ESCITALOPRAM OXALATE 5 MG: 10 TABLET ORAL at 10:16

## 2020-05-06 RX ADMIN — METHYLPHENIDATE HYDROCHLORIDE 5 MG: 10 TABLET ORAL at 12:14

## 2020-05-06 RX ADMIN — ENOXAPARIN SODIUM 40 MG: 40 INJECTION SUBCUTANEOUS at 10:15

## 2020-05-06 RX ADMIN — FAMOTIDINE 20 MG: 20 TABLET ORAL at 23:02

## 2020-05-06 ASSESSMENT — PAIN DESCRIPTION - PAIN TYPE: TYPE: CHRONIC PAIN

## 2020-05-06 ASSESSMENT — PAIN SCALES - GENERAL
PAINLEVEL_OUTOF10: 0
PAINLEVEL_OUTOF10: 7

## 2020-05-06 ASSESSMENT — PAIN DESCRIPTION - LOCATION: LOCATION: BACK

## 2020-05-06 NOTE — PROGRESS NOTES
ACUTE REHAB UNIT  SPEECH/LANGUAGE PATHOLOGY      [x] Daily  [] Weekly Care Conference Note  [] Discharge    Patient:Hanna Castillo     :1934  ELN:[id number]  Room #: NDO-3167/6962-31   Rehab Dx/Hx: Acute ischemic stroke [facility]) [I63.9]  Date of Admit: 2020      Precautions: falls and aspirations  Home situation: Pt lives at home with her . Pt was independent prior to admission   ST Dx: Expressive and receptive aphasia with apraxia component; Cognitive deficits; Dysphagia     Daily Treatment Info:   Date: 2020   Tx session 1  DIANDRA Murphy Tx session 2   Cassius Champgane CCC-SLP    Pain \"I don't think so\" Denied    Subjective     Pt was pleasant and cooperative throughout tx. Pt up in chair. Much more alert, attentive and oriented than yesterday. Pt finishing phrases with \"and all\" which her granddaughter reported is normal for her. Objective:  Goals     Pt will tolerate recommended diet and regular solids with no overt s/s of aspiration, noticeable fatigue or significant pocketing. Facilitated trials from pt's breakfast tray- soft solids (banana, peaches) and thin liquid via straw. Pt exhibited piecemeal swallowing. Mastication and oral clearance were largely adequate and efficient. Swallow onset appeared timely. Delayed throat clear x1 with soft solid- not concerning for aspiration. No other overt s/s of aspiration noted throughout trials. Goal not targeted this session. Pt will improve comprehension of basic commands and yes/no questions to 70% accuracy via graded tasks.  Basic yes/no questions: 7/12 independently, increasing to 9/12 with min cues; noted reduced accuracy particularly when answer was 'no'; pt would state \"I don't understand\"    Pt will complete basic expressive language tasks (sentence completion, naming, automatics) to 70% accuracy with min cues     Addend: Patient will complete moderate level verbal description and word retrieval

## 2020-05-06 NOTE — PROGRESS NOTES
Visited w/patient as requested by family. Patient resting and visit was brief. She seemed somewhat confused trying to follow conversation. Introduced availability of spiritual care and shared blessing.

## 2020-05-06 NOTE — PROGRESS NOTES
Physical Therapy  Facility/Department: [facility] ACUTE REHAB UNIT  Daily Treatment Note  NAME: Jasmina Villar  : 1934  MRN: [id number]    Date of Service: 2020    Discharge Recommendations:  Patient would benefit from continued therapy after discharge, S Level 3, 24 hour supervision or assist   PT Equipment Recommendations  Other: Has W/c and RW at home; additional needs to be addressed based on progress    Assessment   Body structures, Functions, Activity limitations: Decreased functional mobility ; Decreased ROM; Decreased strength;Decreased ADL status; Decreased safe awareness;Decreased cognition;Decreased endurance;Decreased sensation;Decreased balance;Decreased fine motor control;Decreased coordination;Decreased posture  Assessment: Improved tolerance this a.m.; more alert and verbal. Improved activation RUE and trunk in standing. Treatment Diagnosis: RLE weakness, impaired balance, ambulation deficits  Prognosis: Fair;Good  Patient Education:  & : Transfers, midline alignment. Pt need reinforcement.  - : Transfers. Pt able to follow one step commands, needs reinforcement. : Gait - needs single step commands and reinforcement. : Transfers - needs reinforcement  Barriers to Learning: Cognition,communication  REQUIRES PT FOLLOW UP: Yes  Activity Tolerance  Activity Tolerance: Patient Tolerated treatment well  Activity Tolerance: . Patient Diagnosis(es): Stroke   has a past medical history of Arthritis, CAD (coronary artery disease), GERD (gastroesophageal reflux disease), Hx of blood clots, Hyperlipidemia, and Hypertension. has a past surgical history that includes Cholecystectomy; Appendectomy; Varicose vein surgery; Endoscopy, colon, diagnostic; eye surgery; joint replacement; and Breast surgery.     Restrictions  Restrictions/Precautions  Restrictions/Precautions: Fall Risk  Required Braces or Orthoses?: No  Position Activity Restriction  Other position/activity restrictions: Selvin Westfall is a 80 y.o. female who presented to the emergency department  for evaluation for right leg weakness. The patient has a history of hypertension and hyperlipidemia. To ARU 4/20/20  Subjective   General  Chart Reviewed: Yes  Additional Pertinent Hx: CAD, DVT, R TSA, HTN  Response To Previous Treatment: Patient with no complaints from previous session. Family / Caregiver Present: No  Subjective  Subjective: Pt reports that she feels better today. General Comment  Comments: Pt in bed upon arrival  Pain Screening  Patient Currently in Pain: Denies  Vital Signs  Patient Currently in Pain: Denies       Orientation  Orientation  Overall Orientation Status: Impaired  Cognition      Objective   Bed mobility  Rolling to Left: Moderate assistance  Supine to Sit: Moderate assistance  Transfers  Sit to Stand: Moderate Assistance;Minimal Assistance  Stand to sit: Moderate Assistance  Bed to Chair: 2 Person Assistance(Mod A and Min A)  Stand Pivot Transfers: 2 Person Assistance  Lateral Transfers: 2 Person Assistance     Neuromuscular Education  Neuromuscular Comments: Standing reach with TC at pelvis and shin while OT directed reach. Progressed to resisted ex RUE with activation noted throughout R side of trunk     Comment: Co-treat for safety and to maximize function. Performed rolling to dress and bridging to pull up pants. Transferred to w/c as above. Standing reach activities in parallel bars. 2nd session: Pt up in chair, sleeping. Awakens easily. Requests to use bathroom. Stand-pivot to toilet with Mod A. Toileted with assist to stand by PT while OT performed otf care and assisted with clothing. Ambulated 80' with RW and Min A 2. Practiced Sit to stands at RW x 3 focusing on hand placement; pt required max vc and [place] assist each attempt. Transferred to bed per pt request. Max A. Positioned for comfort with call light and needs in reach.       Goals  Short term goals  Time Frame

## 2020-05-06 NOTE — PATIENT CARE CONFERENCE
assistance    NUTRITION:  Weight: 148 lb 14.4 oz (67.5 kg) / Body mass index is 26.38 kg/m². Diet Order:Dysphagia soft & bite-sized, no straws    Supplements:Ensure Enlive TID    Pt remains nutritionally compromised AEB po intake continues at less than 50% of most meals. Pt is consuming Ensure well, as long as it is cold. Wt stable per hx. Please see nutrition note for details. NURSING:  FIMS:       Trishshakeel Taylor Fall Risk Score: high  Wounds/Incisions/Ulcers: none  Medication Review: meds reviewed with patient daily  Pain: tylenol & ultram prn  Consultations/Labs/X-rays: CBC BMP MWF,           Risk for Readmission: 13%  Critical Items: If High Risk, consider the following recommendations:Home Health Nursing    Patient/Family Education provided by team:    Discharge Plan   Estimated Length of Stay: 5 days  Destination: home health  Pass:No  Services at Discharge: [facility] OT s3, home health aid/24 hour supervision   Equipment at Discharge: shower chair/bench, [facility]   Factors facilitating achievement of predicted outcomes:family support, pleasant, cooperative   Barriers to the achievement of predicted outcomes: aphasia, cognition, dominant side hemiparesis   Patient Goals:None stated, does not state,     Rehab Team Members in attendance for Team Conference:  Elgin Crigler, MSW, LSW  Shakira Leonr, RD, LD    Margot Vasquez OTR/KENNY Murray [place], [place], [address], NDT    Heraclio No MA CCC-SLP     Juan Moran BSN, RN, [address],     I approve the established interdisciplinary plan of care as documented within the medical record of [facility].     Shaunna Dutton MD  Electronically signed by Shaunna Dutton MD on 5/7/2020 at 11:44 AM

## 2020-05-06 NOTE — PROGRESS NOTES
Occupational Therapy  Facility/Department: [facility] ACUTE REHAB UNIT  Daily Treatment Note  NAME: Tony Elder  : 1934  MRN: [id number]    Date of Service: 2020    Discharge Recommendations:  24 hour supervision or assist, S Level 3  OT Equipment Recommendations  Other: continue to assess    Assessment   Performance deficits / Impairments: Decreased functional mobility ; Decreased ADL status; Decreased cognition;Decreased safe awareness;Decreased endurance;Decreased vision/visual deficit; Decreased balance;Decreased high-level IADLs  Assessment: Patient presents with the above deficits impacting occupational performance and functioning below baseline level. Recommend skilled OT to address deficits and promote functional independence. Limited by nausea during session today  Treatment Diagnosis: Decreased functional mobility, ADL status, decreased safety, decreased cognition, decreased balance and endurance associated with CVA  Prognosis: Fair;Good  OT Education: Transfer Training;OT Role;ADL Adaptive Strategies; Plan of Care  Patient Education: pt would benefit from continued reinforcement  Barriers to Learning: cognition, aphasia   REQUIRES OT FOLLOW UP: Yes  Activity Tolerance  Activity Tolerance: Patient Tolerated treatment well  Safety Devices  Safety Devices in place: Yes  Type of devices: Call light within reach; Chair alarm in place; Left in chair  Restraints  Initially in place: No         Patient Diagnosis(es): CVA     has a past medical history of Arthritis, CAD (coronary artery disease), GERD (gastroesophageal reflux disease), Hx of blood clots, Hyperlipidemia, and Hypertension. has a past surgical history that includes Cholecystectomy; Appendectomy; Varicose vein surgery; Endoscopy, colon, diagnostic; eye surgery; joint replacement; and Breast surgery.     Restrictions  Restrictions/Precautions  Restrictions/Precautions: Fall Risk  Required Braces or Orthoses?: No  Position Activity Restriction  Other position/activity restrictions: Soham Villa is a 80 y.o. female who presented to the emergency department  for evaluation for right leg weakness. The patient has a history of hypertension and hyperlipidemia. To ARU 4/20/20  Subjective   General  Chart Reviewed: Yes  Patient assessed for rehabilitation services?: Yes  Additional Pertinent Hx: HTN, HLD, CAD, arthritis  Response to previous treatment: Patient with no complaints from previous session  Family / Caregiver Present: No  Diagnosis: CVA  Subjective  Subjective: pt in bed on arrival - states she is feeling \"better than yesterday\"   General Comment  Comments: .        Orientation  Orientation  Orientation Level: Oriented to place;Oriented to situation;Oriented to person  Objective    ADL  Feeding: Setup(drank ensure during session, set up with breakfast end of session )  Grooming: (assist to pick hair, did not offer oral care during session d/t eating breakfast )  LE Dressing: Dependent/Total(brief and pants from supine in bed, CGA to roll R and L )  Additional Comments: donned LB clothing from bed - SPT to w/c with mod A of 1/min A of 1 - to therapy gym in w/c         Standing Balance  Time: 5 minutes x 1   Activity: min/mod A Of 1 for standing provided by PT while this therapist engaged pt in reaching activity crossing midline with B UEs to reach for small objects, pt unable to reach above 60 degrees of flexion with R UE (has shoulder replacement and reports limited ROM in arm prior to CVA)      PM session - pt in chair on arrival - when asked if she needs to use the bathroom states no, then a few seconds later says \"I think I might need to use the bathroom\" - mod A SPT to toilet, assist to pull down pants - mod A Of 1 to stand while second person assisted with posterior pericare - max A to change brief and pants - mod A of 1 to SPT to return to w/c - set up/min A to wash hands - ambulated with RW and min A of 2 persons 89 feet

## 2020-05-07 PROCEDURE — 97116 GAIT TRAINING THERAPY: CPT

## 2020-05-07 PROCEDURE — 97129 THER IVNTJ 1ST 15 MIN: CPT

## 2020-05-07 PROCEDURE — 97535 SELF CARE MNGMENT TRAINING: CPT

## 2020-05-07 PROCEDURE — 51701 INSERT BLADDER CATHETER: CPT

## 2020-05-07 PROCEDURE — 6360000002 HC RX W HCPCS: Performed by: PHYSICAL MEDICINE & REHABILITATION

## 2020-05-07 PROCEDURE — 51798 US URINE CAPACITY MEASURE: CPT

## 2020-05-07 PROCEDURE — 1280000000 HC REHAB R&B

## 2020-05-07 PROCEDURE — 97112 NEUROMUSCULAR REEDUCATION: CPT

## 2020-05-07 PROCEDURE — 6370000000 HC RX 637 (ALT 250 FOR IP): Performed by: UROLOGY

## 2020-05-07 PROCEDURE — 97530 THERAPEUTIC ACTIVITIES: CPT

## 2020-05-07 PROCEDURE — 97130 THER IVNTJ EA ADDL 15 MIN: CPT

## 2020-05-07 PROCEDURE — 6370000000 HC RX 637 (ALT 250 FOR IP): Performed by: PHYSICAL MEDICINE & REHABILITATION

## 2020-05-07 RX ORDER — METHYLPHENIDATE HYDROCHLORIDE 10 MG/1
10 TABLET ORAL
Status: DISCONTINUED | OUTPATIENT
Start: 2020-05-07 | End: 2020-05-20 | Stop reason: HOSPADM

## 2020-05-07 RX ADMIN — DESMOPRESSIN ACETATE 40 MG: 0.2 TABLET ORAL at 22:51

## 2020-05-07 RX ADMIN — METHYLPHENIDATE HYDROCHLORIDE 5 MG: 10 TABLET ORAL at 06:06

## 2020-05-07 RX ADMIN — BETHANECHOL CHLORIDE 10 MG: 10 TABLET ORAL at 22:51

## 2020-05-07 RX ADMIN — CLOPIDOGREL 75 MG: 75 TABLET, FILM COATED ORAL at 09:09

## 2020-05-07 RX ADMIN — METHYLPHENIDATE HYDROCHLORIDE 10 MG: 10 TABLET ORAL at 12:25

## 2020-05-07 RX ADMIN — FAMOTIDINE 20 MG: 20 TABLET ORAL at 09:09

## 2020-05-07 RX ADMIN — LOSARTAN POTASSIUM 100 MG: 100 TABLET, FILM COATED ORAL at 09:10

## 2020-05-07 RX ADMIN — ASPIRIN 81 MG: 81 TABLET, COATED ORAL at 09:10

## 2020-05-07 RX ADMIN — BETHANECHOL CHLORIDE 10 MG: 10 TABLET ORAL at 09:10

## 2020-05-07 RX ADMIN — TAMSULOSIN HYDROCHLORIDE 0.4 MG: 0.4 CAPSULE ORAL at 09:10

## 2020-05-07 RX ADMIN — AMLODIPINE BESYLATE 10 MG: 5 TABLET ORAL at 09:09

## 2020-05-07 RX ADMIN — CALCIUM 500 MG: 500 TABLET ORAL at 09:10

## 2020-05-07 RX ADMIN — BETHANECHOL CHLORIDE 10 MG: 10 TABLET ORAL at 12:25

## 2020-05-07 RX ADMIN — FAMOTIDINE 20 MG: 20 TABLET ORAL at 22:51

## 2020-05-07 RX ADMIN — ESCITALOPRAM OXALATE 5 MG: 10 TABLET ORAL at 09:10

## 2020-05-07 RX ADMIN — ENOXAPARIN SODIUM 40 MG: 40 INJECTION SUBCUTANEOUS at 09:11

## 2020-05-07 ASSESSMENT — PAIN SCALES - GENERAL
PAINLEVEL_OUTOF10: 0
PAINLEVEL_OUTOF10: 3
PAINLEVEL_OUTOF10: 0

## 2020-05-07 ASSESSMENT — PAIN SCALES - WONG BAKER
WONGBAKER_NUMERICALRESPONSE: 0

## 2020-05-07 NOTE — PROGRESS NOTES
Occupational Therapy  Facility/Department: [facility] ACUTE REHAB UNIT  Daily Treatment Note  NAME: Jameel Truong  : 1934  MRN: [id number]    Date of Service: 2020    Discharge Recommendations:  24 hour supervision or assist, S Level 3  OT Equipment Recommendations  Other: continue to assess    Assessment   Performance deficits / Impairments: Decreased functional mobility ; Decreased ADL status; Decreased cognition;Decreased safe awareness;Decreased endurance;Decreased vision/visual deficit; Decreased balance;Decreased high-level IADLs  Assessment: Patient presents with the above deficits impacting occupational performance and functioning below baseline level. Recommend skilled OT to address deficits and promote functional independence. Limited by nausea during session today  Treatment Diagnosis: Decreased functional mobility, ADL status, decreased safety, decreased cognition, decreased balance and endurance associated with CVA  Prognosis: Fair;Good  OT Education: Transfer Training;OT Role;ADL Adaptive Strategies; Plan of Care  Patient Education: pt would benefit from continued reinforcement  Barriers to Learning: cognition, aphasia   REQUIRES OT FOLLOW UP: Yes  Activity Tolerance  Activity Tolerance: Patient Tolerated treatment well  Safety Devices  Safety Devices in place: Yes  Type of devices: (in gym with PT end of session )  Restraints  Initially in place: No         Patient Diagnosis(es): CVA     has a past medical history of Arthritis, CAD (coronary artery disease), GERD (gastroesophageal reflux disease), Hx of blood clots, Hyperlipidemia, and Hypertension. has a past surgical history that includes Cholecystectomy; Appendectomy; Varicose vein surgery; Endoscopy, colon, diagnostic; eye surgery; joint replacement; and Breast surgery.     Restrictions  Restrictions/Precautions  Restrictions/Precautions: Fall Risk  Required Braces or Orthoses?: No  Position Activity Restriction  Other position/activity

## 2020-05-07 NOTE — PROGRESS NOTES
Restriction  Other position/activity restrictions: Jake Kulkarni is a 80 y.o. female who presented to the emergency department  for evaluation for right leg weakness. The patient has a history of hypertension and hyperlipidemia. To ARU 4/20/20  Subjective   General  Chart Reviewed: Yes  Additional Pertinent Hx: CAD, DVT, R TSA, HTN  Response To Previous Treatment: Patient with no complaints from previous session. Family / Caregiver Present: No  Subjective  Subjective: No c/o. General Comment  Comments: Pt up in w/c in bathroom with OT  Pain Screening  Patient Currently in Pain: Denies       Orientation  Orientation  Overall Orientation Status: Impaired     Objective   Transfers  Sit to Stand: Minimal Assistance  Stand to sit: Minimal Assistance  Stand Pivot Transfers: Minimal Assistance    Ambulation 1  Surface: level tile  Device: Rolling Walker  Assistance: Moderate assistance  Quality of Gait: Narrow VIMAL; Pt providing assist to wt shift and to advance RW; Pt has occasional decreased clearance RLE, occasionally \"catching\" with mild LOB, corrected with min A  Distance: 126' x 1, 68' x 1    Stairs  # Steps : 3  Stairs Height: 6\"  Rails: Bilateral  Assistance: 2 Person assistance  Comment: Min-mod A 2      1st session (partial co-treat): Stood at sink with Min A to complete oral care. Stairs as above. Ambulated with PT only (as above)   2nd session: Pt up in w/c; agreeable to PT. Stand-pivot with Mod A to L and Min A to R. Seated stepper x 6 minutes with intermittent physical cues to maintain wes. Performed standing resisted arm flex (yellow T band) with TC increasing to Mod A as pt fatigues and R knee flexes. Performed standing side steps L and R with min A increasing to Mod A with R flex knee and R lateral lean.   Returned to room and set up for lunch with Chair alarm activated, call light and needs in reach       Goals  Short term goals  Time Frame for Short term goals: 1 week  Short term goal 1: Pt

## 2020-05-07 NOTE — PROGRESS NOTES
state who she received card from. - Legibility of writing- (single words) 5/14 (35%) with ongoing max cues for improved attention. Pt able to ID she couldn't read what she was writing but unable to always self correct despite max verbal cues       Education: Provided education to pt re: rationale for speech therapy and POC/goals, requires ongoing learning. Provided education re rationale for treatment tasks and plan of care. Safety Devices:    X   Call light within reach  [x]? Chair alarm activated  []? Bed alarm activated  []? Other:   X   Call light within reach  [x]? Chair alarm activated  []? Bed alarm activated  [x]? Other: Left in presence of / granddaughter       Assessment:   Speech Therapy Diagnosis  Cognitive Diagnosis: Further assessment warranted however demonstrated difficulty attending during evaluation, easily distracted by external stimuli in room, appeared grossly oriented via responses to f/2 verbal choices   Aphasia Diagnosis: Pt presents with moderate to severe expressive and receptive aphasia with suspected apraxia component. Pt with difficulty initiating verbalization and inconsistent errors during evaluation. Pt was limited in responses to yes/no questions and when following basic commands. Pt made few intelligible verbalizations in response to automatic questions. Paraphasias were evident during naming tasks. Current Diet Order: DIET DYSPHAGIA SOFT AND BITE-SIZED; Dietary Nutrition Supplements: Standard High Calorie Oral Supplement  * Assist feed/ set up *   Recommended Form of Meds: Whole with water(One at a time )  Compensatory Swallowing Strategies: Small bites/sips, Eat/Feed slowly, No straws, Upright as possible for all oral intake     Plan:   Frequency:  5days/week   60 minutes/day    Discharge Recommendations:   Barriers: Communication deficits; Awareness; Initiation;  Attention   Discharge Recommendations:  [] Home independently  [] Home with assistance [x]  24

## 2020-05-08 LAB
ANION GAP SERPL CALCULATED.3IONS-SCNC: 7 MMOL/L (ref 3–16)
BACTERIA: ABNORMAL /HPF
BILIRUBIN URINE: NEGATIVE
BLOOD, URINE: NEGATIVE
BUN BLDV-MCNC: 22 MG/DL (ref 7–20)
CALCIUM SERPL-MCNC: 9.4 MG/DL (ref 8.3–10.6)
CHLORIDE BLD-SCNC: 99 MMOL/L (ref 99–110)
CLARITY: ABNORMAL
CO2: 32 MMOL/L (ref 21–32)
COLOR: YELLOW
CREAT SERPL-MCNC: 0.5 MG/DL (ref 0.6–1.2)
EPITHELIAL CELLS, UA: 3 /HPF (ref 0–5)
GFR AFRICAN AMERICAN: >60
GFR NON-AFRICAN AMERICAN: >60
GLUCOSE BLD-MCNC: 96 MG/DL (ref 70–99)
GLUCOSE URINE: NEGATIVE MG/DL
HCT VFR BLD CALC: 32.8 % (ref 36–48)
HEMOGLOBIN: 11.1 G/DL (ref 12–16)
HYALINE CASTS: 5 /LPF (ref 0–8)
KETONES, URINE: NEGATIVE MG/DL
LEUKOCYTE ESTERASE, URINE: ABNORMAL
MCH RBC QN AUTO: 28.6 PG (ref 26–34)
MCHC RBC AUTO-ENTMCNC: 33.7 G/DL (ref 31–36)
MCV RBC AUTO: 84.8 FL (ref 80–100)
MICROSCOPIC EXAMINATION: YES
NITRITE, URINE: NEGATIVE
PDW BLD-RTO: 13.8 % (ref 12.4–15.4)
PH UA: 7.5 (ref 5–8)
PLATELET # BLD: 538 K/UL (ref 135–450)
PMV BLD AUTO: 7.6 FL (ref 5–10.5)
POTASSIUM SERPL-SCNC: 4.3 MMOL/L (ref 3.5–5.1)
PROTEIN UA: NEGATIVE MG/DL
RBC # BLD: 3.87 M/UL (ref 4–5.2)
RBC UA: 1 /HPF (ref 0–4)
SODIUM BLD-SCNC: 138 MMOL/L (ref 136–145)
SPECIFIC GRAVITY UA: 1.01 (ref 1–1.03)
URINE REFLEX TO CULTURE: YES
URINE TYPE: ABNORMAL
UROBILINOGEN, URINE: 0.2 E.U./DL
WBC # BLD: 6.7 K/UL (ref 4–11)
WBC UA: 11 /HPF (ref 0–5)

## 2020-05-08 PROCEDURE — 51701 INSERT BLADDER CATHETER: CPT

## 2020-05-08 PROCEDURE — 6370000000 HC RX 637 (ALT 250 FOR IP): Performed by: PHYSICAL MEDICINE & REHABILITATION

## 2020-05-08 PROCEDURE — 87086 URINE CULTURE/COLONY COUNT: CPT

## 2020-05-08 PROCEDURE — 80048 BASIC METABOLIC PNL TOTAL CA: CPT

## 2020-05-08 PROCEDURE — 97530 THERAPEUTIC ACTIVITIES: CPT

## 2020-05-08 PROCEDURE — 97112 NEUROMUSCULAR REEDUCATION: CPT

## 2020-05-08 PROCEDURE — 36415 COLL VENOUS BLD VENIPUNCTURE: CPT

## 2020-05-08 PROCEDURE — 92507 TX SP LANG VOICE COMM INDIV: CPT

## 2020-05-08 PROCEDURE — 51798 US URINE CAPACITY MEASURE: CPT

## 2020-05-08 PROCEDURE — 85027 COMPLETE CBC AUTOMATED: CPT

## 2020-05-08 PROCEDURE — 97129 THER IVNTJ 1ST 15 MIN: CPT

## 2020-05-08 PROCEDURE — 81001 URINALYSIS AUTO W/SCOPE: CPT

## 2020-05-08 PROCEDURE — 87186 SC STD MICRODIL/AGAR DIL: CPT

## 2020-05-08 PROCEDURE — 6360000002 HC RX W HCPCS: Performed by: PHYSICAL MEDICINE & REHABILITATION

## 2020-05-08 PROCEDURE — 87077 CULTURE AEROBIC IDENTIFY: CPT

## 2020-05-08 PROCEDURE — 1280000000 HC REHAB R&B

## 2020-05-08 PROCEDURE — 97116 GAIT TRAINING THERAPY: CPT

## 2020-05-08 PROCEDURE — 6370000000 HC RX 637 (ALT 250 FOR IP): Performed by: UROLOGY

## 2020-05-08 PROCEDURE — 97535 SELF CARE MNGMENT TRAINING: CPT

## 2020-05-08 RX ADMIN — ASPIRIN 81 MG: 81 TABLET, COATED ORAL at 12:03

## 2020-05-08 RX ADMIN — BETHANECHOL CHLORIDE 10 MG: 10 TABLET ORAL at 12:05

## 2020-05-08 RX ADMIN — METHYLPHENIDATE HYDROCHLORIDE 10 MG: 10 TABLET ORAL at 06:04

## 2020-05-08 RX ADMIN — CLOPIDOGREL 75 MG: 75 TABLET, FILM COATED ORAL at 12:03

## 2020-05-08 RX ADMIN — BETHANECHOL CHLORIDE 10 MG: 10 TABLET ORAL at 22:12

## 2020-05-08 RX ADMIN — METHYLPHENIDATE HYDROCHLORIDE 10 MG: 10 TABLET ORAL at 12:05

## 2020-05-08 RX ADMIN — FAMOTIDINE 20 MG: 20 TABLET ORAL at 12:05

## 2020-05-08 RX ADMIN — DESMOPRESSIN ACETATE 40 MG: 0.2 TABLET ORAL at 22:13

## 2020-05-08 RX ADMIN — TAMSULOSIN HYDROCHLORIDE 0.4 MG: 0.4 CAPSULE ORAL at 12:06

## 2020-05-08 RX ADMIN — ENOXAPARIN SODIUM 40 MG: 40 INJECTION SUBCUTANEOUS at 12:02

## 2020-05-08 RX ADMIN — FAMOTIDINE 20 MG: 20 TABLET ORAL at 22:13

## 2020-05-08 RX ADMIN — CALCIUM 500 MG: 500 TABLET ORAL at 12:05

## 2020-05-08 RX ADMIN — ESCITALOPRAM OXALATE 5 MG: 10 TABLET ORAL at 12:03

## 2020-05-08 ASSESSMENT — PAIN SCALES - GENERAL: PAINLEVEL_OUTOF10: 0

## 2020-05-08 NOTE — PROGRESS NOTES
Hypertension. has a past surgical history that includes Cholecystectomy; Appendectomy; Varicose vein surgery; Endoscopy, colon, diagnostic; eye surgery; joint replacement; and Breast surgery. Restrictions  Restrictions/Precautions  Restrictions/Precautions: Fall Risk  Required Braces or Orthoses?: No  Position Activity Restriction  Other position/activity restrictions: Soham Villa is a 80 y.o. female who presented to the emergency department  for evaluation for right leg weakness. The patient has a history of hypertension and hyperlipidemia. To ARU 4/20/20     Subjective   General  Chart Reviewed: Yes  Additional Pertinent Hx: CAD, DVT, R TSA, HTN  Response To Previous Treatment: Patient with no complaints from previous session. Family / Caregiver Present: No  Subjective  Subjective: No c/o. Able to recall discussion with MD jc [email or web address] General Comment  Comments: Pt up in w/c eating breakfast  Pain Screening  Patient Currently in Pain: Denies       Orientation  Orientation  Overall Orientation Status: Within Functional Limits     Objective   Transfers  Sit to Stand: Minimal Assistance  Stand to sit: Minimal Assistance  Stand Pivot Transfers: Minimal Assistance  Lateral Transfers: Minimal Assistance    Ambulation 1  Surface: level tile  Device: Rolling Walker  Assistance: Minimal assistance  Quality of Gait: Improved VIMAL, still with minimal clearance R but no \"catching\". Mild R knee flexion increasing with fatigue  Distance: 48' with 2 180 degree turns; 60' x 1 limited by fatigue    Stairs  # Steps : 4  Stairs Height: 4\"  Rails: Bilateral  Assistance: Minimal assistance  Comment: Min A and cues for sequence; able to ascend leading L or R     Comment: 1st session: Pt up in chair; able to complete meal despite divided attention. Performed oral hygiene at sink from w/c level, using bimanual UEs effectively.   Performed standing targeted reach, following 1 and 2 step commands and reaching into extension and across midline with each UE. Performed sit to stand x 5 with TC at R.       2nd session: Pt up in w/c in dining room with OT. Agreeable to PT with no c/o. Stand-pivot with min A and mod VC for placement. Seated stepper x 6 minutes for reciprocal patterns, maintaining positioning and tempo while conversing. Ambulated as above, focusing on turns. Stairs as above. Followed by 2nd bout of ambulation focusing on distance. Returned to room and to bathroom for hand hygiene.   Left up in chair with Chair alarm activated, call light and needs in reach         Goals  Short term goals  Time Frame for Short term goals: 1 week  Short term goal 1: Pt will perform bed mobilty with Mod A 1  Short term goal 2: Pt will transfer bed to chair with Mod A 2: goal met  Short term goal 3: Pt will tolerate sitting EOB for functional task x 5 minutes with CGA: goal met  Short term goal 4: Pt will  parallel bars with Mod A 2: goal met  Long term goals  Time Frame for Long term goals : 3 weeks  Long term goal 1: Pt will be mod I for bed mobility  Long term goal 2: Pt will transfer bed to chair with CGA  Long term goal 3: Pt will propel w/c 48' with Mod I  Long term goal 4: Pt will ambulate 22' with AAD and CGA  Long term goal 5: Pt will ascend/descend ramp with Min A  Patient Goals   Patient goals : None stated    Plan    Plan  Times per week: 60 minutes on 5 days/week  Plan weeks: 3-4 weeks  Current Treatment Recommendations: Strengthening, ROM, Balance Training, Functional Mobility Training, Transfer Training, Endurance Training, Wheelchair Mobility Training, Gait Training, Stair training, Neuromuscular Re-education, Pain Management, Safety Education & Training, Home Exercise Program, Patient/Caregiver Education & Training, Equipment Evaluation, Education, & procurement, Modalities, Positioning  Safety Devices  Type of devices: Left in chair, Call light within reach, Chair alarm in place  Restraints  Initially in place: No

## 2020-05-08 NOTE — PROGRESS NOTES
German York  5/8/2020  [id number]    Chief Complaint: Acute ischemic stroke ([facility] [address]    Subjective:   No overnight events. No current complaints. Writing with her right hand legibly in SLP this am.  Still requiring ICs. No sensation to urinate. ROS: No CP, SOB, dyspnea    Objective:  Patient Vitals for the past 24 hrs:   BP Temp Temp src Pulse Resp SpO2   05/07/20 2245 107/66 98.1 °F (36.7 °C) Oral 72 18 96 %     Gen: No distress, pleasant. Resting in WC  HEENT: Normocephalic, atraumatic   CV: Regular rate and rhythm. No MRG   Resp: No respiratory distress. CTAB   Abd: Soft, nontender   Ext: No edema. OA changes at BL knees  Neuro: Alert, oriented x4, aphasia, higher-level cognitive deficits appreciated, appropriately interactive. Laboratory data: Available via EMR. Therapy progress:  PT  Position Activity Restriction  Other position/activity restrictions: German York is a 80 y.o. female who presented to the emergency department  for evaluation for right leg weakness. The patient has a history of hypertension and hyperlipidemia. To ARU 4/20/20  Objective     Sit to Stand: Minimal Assistance  Stand to sit: Minimal Assistance  Bed to Chair: 2 Person Assistance(Mod A and Min A)  Device: [address]: Moderate assistance  Distance: 126' x 1, 68' x 1  OT  PT Equipment Recommendations  Equipment Needed: No  Other: Has W/c and RW at home; additional needs to be addressed based on progress  Toilet - Technique: Stand pivot  Equipment Used: Standard bedside commode  Toilet Transfers Comments: mod A of 1/min A of another   Assessment        SLP  Current Diet : Regular  Current Liquid Diet : Thin  Diet Solids Recommendation: Dysphagia Soft and Bite-Sized (Dysphagia III)  Liquid Consistency Recommendation: Thin    Body mass index is 26.38 kg/m².     Assessment:  Patient Active Problem List   Diagnosis    Acute cerebral infarction ([facility] [address]    Monoparesis of leg ([facility] [address]    Expressive aphasia   

## 2020-05-08 NOTE — PROGRESS NOTES
ACUTE REHAB UNIT  SPEECH/LANGUAGE PATHOLOGY      [x] Daily  [] Weekly Care Conference Note  [] Discharge    Patient:Hanna Cooper     :1934  ZWT:[id number]  Room #: EXD-3991/0457-14   Rehab Dx/Hx: Acute ischemic stroke [facility]) [I63.9]  Date of Admit: 2020      Precautions: falls and aspirations  Home situation: Pt lives at home with her . Pt was independent prior to admission   ST Dx: Expressive and receptive aphasia with apraxia component; Cognitive deficits; Dysphagia     Daily Treatment Info:   Date: 2020   Tx session 1   Tx session [address], [address] [place] CCC-SLP      Pain Denied  Denied    Subjective     Pt sitting up in chair for session. Functional attention for conversation but difficulty sustaining attention to tasks. Up in chair for session. Pt with improved attention to tasks during session and able to complete more tasks with less cueing. Objective:  Goals     Pt will tolerate recommended diet and regular solids with no overt s/s of aspiration, noticeable fatigue or significant pocketing. Pt politely declined trial of upgraded consistencies. She tolerated dry solid (bread with jam) and thin liquid via bottle without observed s/s of aspiration. Pt's self feeding and visual interpretation of items on tray significantly improved from initial evaluation. Goal not targeted this session. Pt will improve comprehension of basic commands and yes/no questions to 70% accuracy via graded tasks. Goal not targeted this session.   - Y/N comparison questions- 10/15 (67%)    Pt will complete basic expressive language tasks (sentence completion, naming, automatics) to 70% accuracy with min cues     Addend: Patient will complete moderate level verbal description and word retrieval tasks with >80% acc min cues.     Abstract convergent naming, via word selection f/3  - 50% accuracy   - required mod-max cues to sustain attention to task (covering remaining items and verbal cues)   - pt with improved comprehension of directions as task progressed      - Responsive naming within crossword puzzle (basic)- 8/8 (100%)   - Matching across columns (categories)- 9/15 (60%) Independently, improved to 13/15 (87%) min cues (mostly related to attention within task to look across to other column to match up words)   - General description of task with PT (walked up and down and sat up and down) required prompts from SLP to expand     Pt will complete graded problem-solving tasks with >80% accuracy. Goal not targeted this session.   - Able to utilize effective problem solving skills/ attention to complete basic crossword puzzle Independently (4 across clues, 4 down clues)      Pt will communicate basic wants/ needs and personal information utilizing multi-modalities with < mod cues. GOAL MET      GOAL MET    Pt will use visual aids/ strategies to state orientation to time, place, situation with 100% accuracy across 3 consecutive sessions. Oriented to   - self  - time, use of digital clock   - day, use of digital clock   - year, use of digital clock   - situation   - place    Oriented to   - Place, city  - Situation  - Time, use of digital clock I   - MANN/ date, use of digital clock I  - Year   - Upcoming holiday I      Other areas targeted: Written expression - home address   - pt attempted x3 to write her street name, use of additional letters, pt with improved awareness of errors and attempted to self correct   - required max cues (modeling, verbal cues) for correction    Legibility of Words within crossword puzzle- 8/8 (100%) with pt printing 1 letter per box with adequate sizing    Education: Provided education to pt re: rationale for speech therapy and POC/goals, requires ongoing learning. Provided education re rationale for treatment tasks and plan of care. Safety Devices:    X   Call light within reach  [x]? Chair alarm activated  []? Bed alarm activated  []?  Other:   X   Call light

## 2020-05-08 NOTE — PROGRESS NOTES
Supervision;Setup(to brush/pick hair after showering )  UE Bathing: Stand by assistance;Setup;Verbal cueing; Increased time to complete  LE Bathing: Minimal assistance;Verbal cueing; Increased time to complete(assist with pericare )  UE Dressing: Minimal assistance(pull over shirt to doff and ashish )  LE Dressing:  Moderate assistance(pt able to thread underwear and pants with extra time and min A, min A to pull pants over hips, max A stockings and shoes )  Toileting: Maximum assistance(mod A pulling pants down, assist with pericare )  Additional Comments: SPT from w/c to [facility] in shower min A with use of grab bars - completed bathing/dressing in walk in shower on BSC at levels above, pt perserverating on washing hair and face and needing moderate physical and verbal cues to cease and continue with rest of bathing/dressing         Balance  Sitting Balance: Stand by assistance  Standing Balance: Minimal assistance  Toilet Transfers  Toilet - Technique: Stand pivot  Equipment Used: Standard bedside commode  Toilet Transfer: Minimal assistance  Toilet Transfers Comments: use in walk in shower for toileting and showering          Cognition  Initiation: Requires cues for some  Sequencing: Requires cues for some  Cognition Comment: increased initiation of conversation today, min cuing for sequencing during ADL tasks       Type of ROM/Therapeutic Exercise  Comment: UE ROM arc x 12 reps with R arm passing R to L side with min support from therapist at elbow, holding vertical dowel patt in B hands 1x10 shoulder flexion             Plan   Plan  Times per week: 60 minutes 5 days per week   Times per day: Daily  Specific instructions for Next Treatment: no longer requires cotx   Current Treatment Recommendations: Functional Mobility Training, Endurance Training, Safety Education & Training, Self-Care / ADL, Home Management Training       Goals  Short term goals  Time Frame for Short term goals: 1 week   Short term goal 1: mod A of 1

## 2020-05-09 PROCEDURE — 6360000002 HC RX W HCPCS: Performed by: PHYSICAL MEDICINE & REHABILITATION

## 2020-05-09 PROCEDURE — 1280000000 HC REHAB R&B

## 2020-05-09 PROCEDURE — 6370000000 HC RX 637 (ALT 250 FOR IP): Performed by: UROLOGY

## 2020-05-09 PROCEDURE — 6370000000 HC RX 637 (ALT 250 FOR IP): Performed by: PHYSICAL MEDICINE & REHABILITATION

## 2020-05-09 RX ADMIN — CALCIUM 500 MG: 500 TABLET ORAL at 08:23

## 2020-05-09 RX ADMIN — FAMOTIDINE 20 MG: 20 TABLET ORAL at 08:23

## 2020-05-09 RX ADMIN — LOSARTAN POTASSIUM 100 MG: 100 TABLET, FILM COATED ORAL at 08:23

## 2020-05-09 RX ADMIN — DESMOPRESSIN ACETATE 40 MG: 0.2 TABLET ORAL at 21:13

## 2020-05-09 RX ADMIN — ESCITALOPRAM OXALATE 5 MG: 10 TABLET ORAL at 08:23

## 2020-05-09 RX ADMIN — ASPIRIN 81 MG: 81 TABLET, COATED ORAL at 08:23

## 2020-05-09 RX ADMIN — BETHANECHOL CHLORIDE 10 MG: 10 TABLET ORAL at 21:13

## 2020-05-09 RX ADMIN — ENOXAPARIN SODIUM 40 MG: 40 INJECTION SUBCUTANEOUS at 08:22

## 2020-05-09 RX ADMIN — AMLODIPINE BESYLATE 10 MG: 5 TABLET ORAL at 08:22

## 2020-05-09 RX ADMIN — METHYLPHENIDATE HYDROCHLORIDE 10 MG: 10 TABLET ORAL at 07:01

## 2020-05-09 RX ADMIN — TRAZODONE HYDROCHLORIDE 50 MG: 50 TABLET ORAL at 21:16

## 2020-05-09 RX ADMIN — FAMOTIDINE 20 MG: 20 TABLET ORAL at 21:13

## 2020-05-09 RX ADMIN — CLOPIDOGREL 75 MG: 75 TABLET, FILM COATED ORAL at 08:23

## 2020-05-09 RX ADMIN — TAMSULOSIN HYDROCHLORIDE 0.4 MG: 0.4 CAPSULE ORAL at 08:23

## 2020-05-09 RX ADMIN — BETHANECHOL CHLORIDE 10 MG: 10 TABLET ORAL at 08:23

## 2020-05-09 RX ADMIN — METHYLPHENIDATE HYDROCHLORIDE 10 MG: 10 TABLET ORAL at 12:08

## 2020-05-09 RX ADMIN — BETHANECHOL CHLORIDE 10 MG: 10 TABLET ORAL at 15:07

## 2020-05-09 ASSESSMENT — PAIN SCALES - GENERAL: PAINLEVEL_OUTOF10: 0

## 2020-05-09 NOTE — PROGRESS NOTES
Francesco Hussein  5/9/2020  [id number]    Chief Complaint: Acute ischemic stroke ([address]    Subjective:   No overnight events. No current complaints. Progressing very well from a motor and speech standpoint following her multiple CVAs. ROS: No CP, SOB, dyspnea    Objective:  Patient Vitals for the past 24 hrs:   BP Temp Temp src Pulse Resp SpO2   05/09/20 0809 137/68 99.3 °F (37.4 °C) Oral 87 16 97 %   05/08/20 2145 114/62 99 °F (37.2 °C) Oral 90 16 --     Gen: No distress, pleasant. Resting in WC  HEENT: Normocephalic, atraumatic   CV: Regular rate and rhythm. No MRG   Resp: No respiratory distress. CTAB   Abd: Soft, nontender   Ext: No edema. OA changes at BL knees  Neuro: Alert, oriented x4, aphasia, higher-level cognitive deficits appreciated, appropriately interactive. Laboratory data: Available via EMR. Therapy progress:  PT  Position Activity Restriction  Other position/activity restrictions: Francesco Hussein is a 80 y.o. female who presented to the emergency department  for evaluation for right leg weakness. The patient has a history of hypertension and hyperlipidemia. To ARU 4/20/20  Objective     Sit to Stand: Minimal Assistance  Stand to sit: Minimal Assistance  Bed to Chair: 2 Person Assistance(Mod A and Min A)  Device: [address]: Minimal assistance  Distance: 48' with 2 180 degree turns; 60' x 1 limited by fatigue  OT  PT Equipment Recommendations  Equipment Needed: No  Other: Has W/c and RW at home; additional needs to be addressed based on progress  Toilet - Technique: Stand pivot  Equipment Used: Standard bedside commode  Toilet Transfers Comments: use in walk in shower for toileting and showering   Assessment        SLP  Current Diet : Regular  Current Liquid Diet : Thin  Diet Solids Recommendation: Dysphagia Soft and Bite-Sized (Dysphagia III)  Liquid Consistency Recommendation: Thin    Body mass index is 26.38 kg/m².     Assessment:  Patient Active Problem List

## 2020-05-09 NOTE — PLAN OF CARE
Problem: Falls - Risk of:  Goal: Will remain free from falls  Description: Will remain free from falls  5/9/2020 1011 by Nghia Auguste RN  Outcome: Ongoing  Note: Pt remains free from falls. Safety precautions in place. Bed in lowest position, bed/chair wheels locked, call light with in reach, bed/chair alarm on, fall risk wrist band on, SAFE outside of doorway. Will continue to monitor.

## 2020-05-09 NOTE — PLAN OF CARE
Problem: Falls - Risk of:  Goal: Will remain free from falls  Description: Will remain free from falls  Outcome: Ongoing     Problem: Falls - Risk of:  Goal: Absence of physical injury  Description: Absence of physical injury  Outcome: Ongoing     Problem: Pain:  Goal: Pain level will decrease  Description: Pain level will decrease  Outcome: Ongoing     Problem: Pain:  Goal: Control of acute pain  Description: Control of acute pain  Outcome: Ongoing     Problem: Pain:  Goal: Control of chronic pain  Description: Control of chronic pain  Outcome: Ongoing     Problem: IP BLADDER/VOIDING  Goal: STG - Patient demonstrates no accidents  Outcome: Ongoing     Problem: IP BOWEL ELIMINATION  Goal: STG - patient will be accident free  Outcome: Ongoing     Problem: IP BOWEL ELIMINATION  Goal: STG - patient maintains skin integrity  Outcome: Ongoing     Problem: SAFETY  Goal: STG - Patient uses call light consistently to request assistance with transfers  Outcome: Ongoing     Problem: SKIN INTEGRITY  Goal: STG - patient will maintain good skin integrity  Outcome: Ongoing     Problem: Nutrition  Goal: Optimal nutrition therapy  Outcome: Ongoing

## 2020-05-10 LAB
ORGANISM: ABNORMAL
URINE CULTURE, ROUTINE: ABNORMAL

## 2020-05-10 PROCEDURE — 6360000002 HC RX W HCPCS: Performed by: PHYSICAL MEDICINE & REHABILITATION

## 2020-05-10 PROCEDURE — 1280000000 HC REHAB R&B

## 2020-05-10 PROCEDURE — 6370000000 HC RX 637 (ALT 250 FOR IP): Performed by: PHYSICAL MEDICINE & REHABILITATION

## 2020-05-10 PROCEDURE — 6370000000 HC RX 637 (ALT 250 FOR IP): Performed by: UROLOGY

## 2020-05-10 RX ADMIN — CLOPIDOGREL 75 MG: 75 TABLET, FILM COATED ORAL at 08:39

## 2020-05-10 RX ADMIN — CALCIUM 500 MG: 500 TABLET ORAL at 08:39

## 2020-05-10 RX ADMIN — DESMOPRESSIN ACETATE 40 MG: 0.2 TABLET ORAL at 21:25

## 2020-05-10 RX ADMIN — FAMOTIDINE 20 MG: 20 TABLET ORAL at 08:39

## 2020-05-10 RX ADMIN — BETHANECHOL CHLORIDE 10 MG: 10 TABLET ORAL at 08:39

## 2020-05-10 RX ADMIN — FAMOTIDINE 20 MG: 20 TABLET ORAL at 21:25

## 2020-05-10 RX ADMIN — TAMSULOSIN HYDROCHLORIDE 0.4 MG: 0.4 CAPSULE ORAL at 08:39

## 2020-05-10 RX ADMIN — ENOXAPARIN SODIUM 40 MG: 40 INJECTION SUBCUTANEOUS at 08:39

## 2020-05-10 RX ADMIN — AMLODIPINE BESYLATE 10 MG: 5 TABLET ORAL at 08:40

## 2020-05-10 RX ADMIN — ESCITALOPRAM OXALATE 5 MG: 10 TABLET ORAL at 08:39

## 2020-05-10 RX ADMIN — METHYLPHENIDATE HYDROCHLORIDE 10 MG: 10 TABLET ORAL at 05:51

## 2020-05-10 RX ADMIN — TRAMADOL HYDROCHLORIDE 50 MG: 50 TABLET, FILM COATED ORAL at 21:40

## 2020-05-10 RX ADMIN — ASPIRIN 81 MG: 81 TABLET, COATED ORAL at 08:40

## 2020-05-10 RX ADMIN — METHYLPHENIDATE HYDROCHLORIDE 10 MG: 10 TABLET ORAL at 12:42

## 2020-05-10 RX ADMIN — BETHANECHOL CHLORIDE 10 MG: 10 TABLET ORAL at 21:25

## 2020-05-10 RX ADMIN — LOSARTAN POTASSIUM 100 MG: 100 TABLET, FILM COATED ORAL at 08:39

## 2020-05-10 RX ADMIN — BETHANECHOL CHLORIDE 10 MG: 10 TABLET ORAL at 12:42

## 2020-05-10 ASSESSMENT — PAIN DESCRIPTION - PAIN TYPE: TYPE: CHRONIC PAIN

## 2020-05-10 ASSESSMENT — PAIN SCALES - GENERAL
PAINLEVEL_OUTOF10: 0
PAINLEVEL_OUTOF10: 4

## 2020-05-10 ASSESSMENT — PAIN DESCRIPTION - ORIENTATION: ORIENTATION: RIGHT

## 2020-05-10 ASSESSMENT — PAIN DESCRIPTION - LOCATION: LOCATION: KNEE

## 2020-05-11 LAB
ANION GAP SERPL CALCULATED.3IONS-SCNC: 6 MMOL/L (ref 3–16)
BUN BLDV-MCNC: 18 MG/DL (ref 7–20)
CALCIUM SERPL-MCNC: 9.1 MG/DL (ref 8.3–10.6)
CHLORIDE BLD-SCNC: 100 MMOL/L (ref 99–110)
CO2: 28 MMOL/L (ref 21–32)
CREAT SERPL-MCNC: <0.5 MG/DL (ref 0.6–1.2)
GFR AFRICAN AMERICAN: >60
GFR NON-AFRICAN AMERICAN: >60
GLUCOSE BLD-MCNC: 119 MG/DL (ref 70–99)
HCT VFR BLD CALC: 30.7 % (ref 36–48)
HEMOGLOBIN: 10.4 G/DL (ref 12–16)
MCH RBC QN AUTO: 28.7 PG (ref 26–34)
MCHC RBC AUTO-ENTMCNC: 33.8 G/DL (ref 31–36)
MCV RBC AUTO: 85 FL (ref 80–100)
PDW BLD-RTO: 14.1 % (ref 12.4–15.4)
PLATELET # BLD: 523 K/UL (ref 135–450)
PMV BLD AUTO: 7.6 FL (ref 5–10.5)
POTASSIUM SERPL-SCNC: 4.1 MMOL/L (ref 3.5–5.1)
RBC # BLD: 3.61 M/UL (ref 4–5.2)
SODIUM BLD-SCNC: 134 MMOL/L (ref 136–145)
WBC # BLD: 10 K/UL (ref 4–11)

## 2020-05-11 PROCEDURE — 97530 THERAPEUTIC ACTIVITIES: CPT

## 2020-05-11 PROCEDURE — 92526 ORAL FUNCTION THERAPY: CPT

## 2020-05-11 PROCEDURE — 36415 COLL VENOUS BLD VENIPUNCTURE: CPT

## 2020-05-11 PROCEDURE — 6370000000 HC RX 637 (ALT 250 FOR IP): Performed by: UROLOGY

## 2020-05-11 PROCEDURE — 80048 BASIC METABOLIC PNL TOTAL CA: CPT

## 2020-05-11 PROCEDURE — 97110 THERAPEUTIC EXERCISES: CPT

## 2020-05-11 PROCEDURE — 6360000002 HC RX W HCPCS: Performed by: PHYSICAL MEDICINE & REHABILITATION

## 2020-05-11 PROCEDURE — 1280000000 HC REHAB R&B

## 2020-05-11 PROCEDURE — 85027 COMPLETE CBC AUTOMATED: CPT

## 2020-05-11 PROCEDURE — 6370000000 HC RX 637 (ALT 250 FOR IP): Performed by: PHYSICAL MEDICINE & REHABILITATION

## 2020-05-11 PROCEDURE — 92507 TX SP LANG VOICE COMM INDIV: CPT

## 2020-05-11 PROCEDURE — 97535 SELF CARE MNGMENT TRAINING: CPT

## 2020-05-11 PROCEDURE — 97116 GAIT TRAINING THERAPY: CPT

## 2020-05-11 PROCEDURE — 97129 THER IVNTJ 1ST 15 MIN: CPT

## 2020-05-11 RX ORDER — NITROFURANTOIN 25; 75 MG/1; MG/1
100 CAPSULE ORAL EVERY 12 HOURS SCHEDULED
Status: DISCONTINUED | OUTPATIENT
Start: 2020-05-11 | End: 2020-05-18

## 2020-05-11 RX ADMIN — ENOXAPARIN SODIUM 40 MG: 40 INJECTION SUBCUTANEOUS at 08:16

## 2020-05-11 RX ADMIN — NITROFURANTOIN MONOHYDRATE/MACROCRYSTALLINE 100 MG: 25; 75 CAPSULE ORAL at 08:15

## 2020-05-11 RX ADMIN — METHYLPHENIDATE HYDROCHLORIDE 10 MG: 10 TABLET ORAL at 12:13

## 2020-05-11 RX ADMIN — BETHANECHOL CHLORIDE 10 MG: 10 TABLET ORAL at 23:15

## 2020-05-11 RX ADMIN — BETHANECHOL CHLORIDE 10 MG: 10 TABLET ORAL at 12:13

## 2020-05-11 RX ADMIN — NITROFURANTOIN MONOHYDRATE/MACROCRYSTALLINE 100 MG: 25; 75 CAPSULE ORAL at 23:15

## 2020-05-11 RX ADMIN — FAMOTIDINE 20 MG: 20 TABLET ORAL at 23:16

## 2020-05-11 RX ADMIN — TAMSULOSIN HYDROCHLORIDE 0.4 MG: 0.4 CAPSULE ORAL at 08:15

## 2020-05-11 RX ADMIN — ACETAMINOPHEN 650 MG: 325 TABLET, FILM COATED ORAL at 23:15

## 2020-05-11 RX ADMIN — DESMOPRESSIN ACETATE 40 MG: 0.2 TABLET ORAL at 23:15

## 2020-05-11 RX ADMIN — LOSARTAN POTASSIUM 100 MG: 100 TABLET, FILM COATED ORAL at 08:15

## 2020-05-11 RX ADMIN — ONDANSETRON 4 MG: 4 TABLET, ORALLY DISINTEGRATING ORAL at 10:38

## 2020-05-11 RX ADMIN — BETHANECHOL CHLORIDE 10 MG: 10 TABLET ORAL at 08:15

## 2020-05-11 RX ADMIN — AMLODIPINE BESYLATE 10 MG: 5 TABLET ORAL at 08:15

## 2020-05-11 RX ADMIN — FAMOTIDINE 20 MG: 20 TABLET ORAL at 08:15

## 2020-05-11 RX ADMIN — CALCIUM 500 MG: 500 TABLET ORAL at 08:16

## 2020-05-11 RX ADMIN — TRAMADOL HYDROCHLORIDE 50 MG: 50 TABLET, FILM COATED ORAL at 15:43

## 2020-05-11 RX ADMIN — CLOPIDOGREL 75 MG: 75 TABLET, FILM COATED ORAL at 08:15

## 2020-05-11 RX ADMIN — METHYLPHENIDATE HYDROCHLORIDE 10 MG: 10 TABLET ORAL at 06:07

## 2020-05-11 RX ADMIN — ASPIRIN 81 MG: 81 TABLET, COATED ORAL at 08:15

## 2020-05-11 RX ADMIN — ESCITALOPRAM OXALATE 5 MG: 10 TABLET ORAL at 08:15

## 2020-05-11 ASSESSMENT — PAIN SCALES - GENERAL
PAINLEVEL_OUTOF10: 1
PAINLEVEL_OUTOF10: 1
PAINLEVEL_OUTOF10: 7
PAINLEVEL_OUTOF10: 1

## 2020-05-11 NOTE — PROGRESS NOTES
Patient had 1 episode of emesis, gave Monico Ingram will continue to monitor patient, most likely from her newly added atb

## 2020-05-11 NOTE — PROGRESS NOTES
Arthritis, CAD (coronary artery disease), GERD (gastroesophageal reflux disease), Hx of blood clots, Hyperlipidemia, and Hypertension. has a past surgical history that includes Cholecystectomy; Appendectomy; Varicose vein surgery; Endoscopy, colon, diagnostic; eye surgery; joint replacement; and Breast surgery. Restrictions  Restrictions/Precautions  Restrictions/Precautions: Fall Risk  Required Braces or Orthoses?: No  Position Activity Restriction  Other position/activity restrictions: Rosi Paniagua is a 80 y.o. female who presented to the emergency department  for evaluation for right leg weakness. The patient has a history of hypertension and hyperlipidemia. To ARU 4/20/20  Subjective   General  Chart Reviewed: Yes  Additional Pertinent Hx: CAD, DVT, R TSA, HTN  Response To Previous Treatment: Patient with no complaints from previous session. Family / Caregiver Present: No  Subjective  Subjective: Pt sitting up in w/c finishing breakfast with SLP leaving room. Pt agreeable to PT treatment. Pain Screening  Patient Currently in Pain: Denies  Vital Signs  Patient Currently in Pain: Denies       Orientation  Orientation  Overall Orientation Status: Within Functional Limits  Cognition      Objective   Bed mobility  Supine to Sit: Moderate assistance(for BLEs)  Scooting: Contact guard assistance(cues for technique and hand placement)  Transfers  Sit to Stand: Moderate Assistance;Minimal Assistance(Mod A for initial 2 stands from W/C, progressed to min A for subsequent stands; however, requiring repetition and MAX verbal cues for hand placement and sequencing of task)  Stand to sit: Minimal Assistance(max verbal cues for hand placement)  Stand Pivot Transfers: Moderate Assistance  Ambulation  Ambulation?: Yes  Ambulation 1  Surface: level tile  Device: Rolling Walker  Assistance:  Moderate assistance  Quality of Gait: Improved VIMAL, still with minimal clearance R, difficulty advancing RLE during ambulation, 1  Short term goal 2: Pt will transfer bed to chair with Mod A 2: goal met  Short term goal 3: Pt will tolerate sitting EOB for functional task x 5 minutes with CGA: goal met  Short term goal 4: Pt will  parallel bars with Mod A 2: goal met  Long term goals  Time Frame for Long term goals : 3 weeks  Long term goal 1: Pt will be mod I for bed mobility  Long term goal 2: Pt will transfer bed to chair with CGA  Long term goal 3: Pt will propel w/c 48' with Mod I  Long term goal 4: Pt will ambulate 22' with AAD and CGA  Long term goal 5: Pt will ascend/descend ramp with Min A  Patient Goals   Patient goals : None stated    Plan    Plan  Times per week: 60 minutes on 5 days/week  Plan weeks: 3-4 weeks  Current Treatment Recommendations: Strengthening, ROM, Balance Training, Functional Mobility Training, Transfer Training, Endurance Training, Wheelchair Mobility Training, Gait Training, Stair training, Neuromuscular Re-education, Pain Management, Safety Education & Training, Home Exercise Program, Patient/Caregiver Education & Training, Equipment Evaluation, Education, & procurement, Modalities, Positioning  Safety Devices  Type of devices: Left in chair, Call light within reach, Chair alarm in place, Gait belt, Patient at risk for falls  Restraints  Initially in place: No     Therapy Time   Individual Concurrent Group Co-treatment   Time In 0900         Time Out 0930         Minutes 30              Second Session Therapy Time:   Individual Concurrent Group Co-treatment   Time In 1245         Time Out 1315         Minutes 30           Timed Code Treatment Minutes:  60    Total Treatment Minutes:  [address] [place] [address],    Man Mensah, [address] [facility], [place], [id number]

## 2020-05-11 NOTE — CARE COORDINATION
[facility] approved extended stay.  NRD 5/14     Auth #: [id number]  Fax #: [phone]

## 2020-05-11 NOTE — PROGRESS NOTES
difficulty with getting out of bed this morning, pt unable to attain  STEDY despite max A provided, able to assist with transfer more during SPT   Bed mobility  Sit to Supine:  Moderate assistance(assist wtih B LEs )         Cognition  Initiation: Requires cues for some  Sequencing: Requires cues for some  Cognition Comment: pt reports she \"was up late last night because we had to go to [place] to get a TV worked on\" - reoriented to situation and place        PM session - pt in chair on arrival - states she is feeling better than earlier but appears fatigued/close to asleep in chair - completed AROM exercises with cane held bilaterally chest press 1x10, shoulder flexion 1x10, R arm only sup/pronation 1x10, arm circles with cane supported on floor clockwise and counterclockwise each 1x10, row in sitting 1x10 with R arm only and 1x10 with B arms - standing at ballet bar mod A for initial stand and min A x 1 minutes in standing for L arm overhead reach 1x5 and R arm alternating from high ballet bar to low ballet bar 1x5 - returned to room and min A stand pivot to EOB, mod A to scoot in sitting and mod A sit to supine - in bed with alarm in place and needs in reach end of session - family present     Plan   Plan  Times per week: 60 minutes 5 days per week   Times per day: Daily  Specific instructions for Next Treatment: no longer requires cotx   Current Treatment Recommendations: Functional Mobility Training, Endurance Training, Safety Education & Training, Self-Care / ADL, Home Management Training       Goals  Short term goals  Time Frame for Short term goals: 1 week   Short term goal 1: mod A of 1 functional transfers- dependent (max A of 1, mod A of 1) 4/23  Short term goal 2: mod A of 1 UB bathing/dressing- max A 4/23  Short term goal 3: max A of 1 LB bathing/dressing- max A to dependent 4/23  Long term goals  Time Frame for Long term goals : 3 weeks   Long term goal 1: CGA functional transfers   Long term

## 2020-05-11 NOTE — PROGRESS NOTES
Discharge Recommendations:  [] Home independently  [] Home with assistance [x]  24 hour supervision  [] SNF [] Other:  Continued SLP Treatment:  [x] Yes [] No [] TBD based on progress while on ARU [] Vital Stim indicated [] Other:   Estimated discharge date: 5/20/20    Type of Total Treatment Minutes   Session 1   Session 2 Session 3   Time In 0825 1115 1350   Time Out 0900 1125 1410   Timed Code Minutes  15     Individual Treatment Minutes  35 10 20   Co-Treatment Minutes       Group Treatment Minutes       Concurrent Treatment Minutes         TOTAL DAILY MINUTES: 60     Electronically Signed by     Martell Eduardo M.A.  CCC-SLP S.PMaximiliano [id number]  Speech-Language Pathologist [phone]  5/11/2020 9:09 AM

## 2020-05-12 PROCEDURE — 6370000000 HC RX 637 (ALT 250 FOR IP): Performed by: PHYSICAL MEDICINE & REHABILITATION

## 2020-05-12 PROCEDURE — 97530 THERAPEUTIC ACTIVITIES: CPT

## 2020-05-12 PROCEDURE — 92507 TX SP LANG VOICE COMM INDIV: CPT

## 2020-05-12 PROCEDURE — 6370000000 HC RX 637 (ALT 250 FOR IP): Performed by: UROLOGY

## 2020-05-12 PROCEDURE — 97535 SELF CARE MNGMENT TRAINING: CPT

## 2020-05-12 PROCEDURE — 6360000002 HC RX W HCPCS: Performed by: PHYSICAL MEDICINE & REHABILITATION

## 2020-05-12 PROCEDURE — 97130 THER IVNTJ EA ADDL 15 MIN: CPT

## 2020-05-12 PROCEDURE — 97112 NEUROMUSCULAR REEDUCATION: CPT

## 2020-05-12 PROCEDURE — 1280000000 HC REHAB R&B

## 2020-05-12 PROCEDURE — 97116 GAIT TRAINING THERAPY: CPT

## 2020-05-12 PROCEDURE — 97129 THER IVNTJ 1ST 15 MIN: CPT

## 2020-05-12 RX ADMIN — ENOXAPARIN SODIUM 40 MG: 40 INJECTION SUBCUTANEOUS at 09:11

## 2020-05-12 RX ADMIN — METHYLPHENIDATE HYDROCHLORIDE 10 MG: 10 TABLET ORAL at 06:39

## 2020-05-12 RX ADMIN — FAMOTIDINE 20 MG: 20 TABLET ORAL at 22:54

## 2020-05-12 RX ADMIN — CLOPIDOGREL 75 MG: 75 TABLET, FILM COATED ORAL at 09:08

## 2020-05-12 RX ADMIN — AMLODIPINE BESYLATE 10 MG: 5 TABLET ORAL at 09:08

## 2020-05-12 RX ADMIN — BETHANECHOL CHLORIDE 10 MG: 10 TABLET ORAL at 09:07

## 2020-05-12 RX ADMIN — NITROFURANTOIN MONOHYDRATE/MACROCRYSTALLINE 100 MG: 25; 75 CAPSULE ORAL at 22:54

## 2020-05-12 RX ADMIN — BETHANECHOL CHLORIDE 10 MG: 10 TABLET ORAL at 22:54

## 2020-05-12 RX ADMIN — TRAMADOL HYDROCHLORIDE 50 MG: 50 TABLET, FILM COATED ORAL at 06:39

## 2020-05-12 RX ADMIN — LOSARTAN POTASSIUM 100 MG: 100 TABLET, FILM COATED ORAL at 09:13

## 2020-05-12 RX ADMIN — METHYLPHENIDATE HYDROCHLORIDE 10 MG: 10 TABLET ORAL at 12:23

## 2020-05-12 RX ADMIN — ESCITALOPRAM OXALATE 5 MG: 10 TABLET ORAL at 09:07

## 2020-05-12 RX ADMIN — BETHANECHOL CHLORIDE 10 MG: 10 TABLET ORAL at 14:05

## 2020-05-12 RX ADMIN — DESMOPRESSIN ACETATE 40 MG: 0.2 TABLET ORAL at 22:54

## 2020-05-12 RX ADMIN — FAMOTIDINE 20 MG: 20 TABLET ORAL at 09:07

## 2020-05-12 RX ADMIN — ASPIRIN 81 MG: 81 TABLET, COATED ORAL at 09:07

## 2020-05-12 RX ADMIN — TAMSULOSIN HYDROCHLORIDE 0.4 MG: 0.4 CAPSULE ORAL at 09:07

## 2020-05-12 RX ADMIN — CALCIUM 500 MG: 500 TABLET ORAL at 09:07

## 2020-05-12 RX ADMIN — NITROFURANTOIN MONOHYDRATE/MACROCRYSTALLINE 100 MG: 25; 75 CAPSULE ORAL at 09:13

## 2020-05-12 ASSESSMENT — PAIN DESCRIPTION - ORIENTATION: ORIENTATION: RIGHT

## 2020-05-12 ASSESSMENT — PAIN SCALES - GENERAL
PAINLEVEL_OUTOF10: 1
PAINLEVEL_OUTOF10: 0
PAINLEVEL_OUTOF10: 5

## 2020-05-12 ASSESSMENT — PAIN DESCRIPTION - LOCATION: LOCATION: KNEE

## 2020-05-12 NOTE — PROGRESS NOTES
Contact Guard;Minimal assistance            Plan   Plan  Times per week: 60 minutes 5 days per week   Times per day: Daily  Specific instructions for Next Treatment: no longer requires cotx   Current Treatment Recommendations: Functional Mobility Training, Endurance Training, Safety Education & Training, Self-Care / ADL, Home Management Training          Goals  Short term goals  Time Frame for Short term goals: 1 week   Short term goal 1: mod A of 1 functional transfers- dependent (max A of 1, mod A of 1) 4/23  Short term goal 2: mod A of 1 UB bathing/dressing- max A 4/23  Short term goal 3: max A of 1 LB bathing/dressing- max A to dependent 4/23  Long term goals  Time Frame for Long term goals : 3 weeks   Long term goal 1: CGA functional transfers   Long term goal 2: min A UB bathing/dressing, set up grooming  Long term goal 3: mod A LB bathing/dressing and toileting   Long term goal 4: w/c mobility for ADL and IADL tasks mod I   Long term goal 5: min A simple IADLs from w/c level   Patient Goals   Patient goals : does not state       Therapy Time   Individual Concurrent Group Co-treatment   Time In 1000         Time Out 1100         Minutes 60         Timed Code Treatment Minutes: 60 Minutes   Total minutes 60    Leo Klein, OT   Leo Andrade OTR/L [id number], 5/12/2020, 11:41 AM

## 2020-05-12 NOTE — PROGRESS NOTES
Mira Cross  5/12/2020  [id number]    Chief Complaint: Acute ischemic stroke ([address]    Subjective:   No overnight events. No current complaints. Participating well in therapy. ROS: No CP, SOB, dyspnea    Objective:  Patient Vitals for the past 24 hrs:   BP Temp Temp src Pulse Resp SpO2   05/12/20 0907 112/71 97.7 °F (36.5 °C) Oral 100 16 --   05/11/20 1543 111/67 99.8 °F (37.7 °C) Oral 89 16 93 %     Gen: No distress, pleasant. Resting in WC  HEENT: Normocephalic, atraumatic   CV: Regular rate and rhythm. No MRG   Resp: No respiratory distress. CTAB   Abd: Soft, nontender   Ext: No edema. OA changes at BL knees  Neuro: Alert, oriented x2, aphasia, higher-level cognitive deficits appreciated, appropriately interactive. Laboratory data: Available via EMR. Therapy progress:  PT  Position Activity Restriction  Other position/activity restrictions: Mira Cross is a 80 y.o. female who presented to the emergency department  for evaluation for right leg weakness. The patient has a history of hypertension and hyperlipidemia. To ARU 4/20/20  Objective     Sit to Stand: Moderate Assistance, Minimal Assistance(Mod A for initial 2 stands from W/C, progressed to min A for subsequent stands; however, requiring repetition and MAX verbal cues for hand placement and sequencing of task)  Stand to sit: Minimal Assistance(max verbal cues for hand placement)  Bed to Chair: 2 Person Assistance(Mod A and Min A)  Device: [address]:  Moderate assistance  Distance: 30 ft  OT  PT Equipment Recommendations  Equipment Needed: No  Other: Has W/c and RW at home; additional needs to be addressed based on progress  Toilet - Technique: Stand pivot  Equipment Used: Standard bedside commode  Toilet Transfers Comments: mod A SPT to toilet, max A SPT off toilet - attempted STEDY off toilet to assess use of device with nursing due to their report of difficulty with getting out of bed this morning, pt unable to attain

## 2020-05-12 NOTE — PROGRESS NOTES
surgery. Restrictions  Restrictions/Precautions  Restrictions/Precautions: Fall Risk  Required Braces or Orthoses?: No  Position Activity Restriction  Other position/activity restrictions: Soham Villa is a 80 y.o. female who presented to the emergency department  for evaluation for right leg weakness. The patient has a history of hypertension and hyperlipidemia. To ARU 4/20/20  Subjective   General  Chart Reviewed: Yes  Additional Pertinent Hx: CAD, DVT, R TSA, HTN  Response To Previous Treatment: Patient with no complaints from previous session. Family / Caregiver Present: No  Subjective  Subjective: No c/o; agreeable to PT  General Comment  Comments: Pt in bed, watching TV; now with urinary catheter  Pain Screening  Patient Currently in Pain: Denies  Vital Signs  Patient Currently in Pain: Denies       Orientation  Orientation  Overall Orientation Status: Within Functional Limits  Cognition      Objective   Bed mobility  Rolling to Left: Minimal assistance  Supine to Sit: Moderate assistance  Transfers  Sit to Stand: Minimal Assistance  Bed to Chair: Moderate assistance  Stand Pivot Transfers: Moderate Assistance     Neuromuscular Education  Neuromuscular Comments: Standing reach L and RUE; Standing lateral wt shifts; standing taps with TC at R knee to encourage extension; Pt intermittently extends knee and torso with vc. Standing push/pull RUE with improved trunk extension   Comment: 1st session: LB dressing including donning of compression socks. Bed moblity and transfers as above. Focus on session is upright posture and R knee extension with standing functiona moblity. 2nd session: Pt up in w/c, agreeable to PT but reports that she is sleepy. Stand-pivot x 7 throughout session varying from min A to Mod A 1. Seated stepper x 6 minutes with frequent assist to maintain speed. Ambulated 80' with RW and min-CGA. Transferred to EOB and pt voiced need to toilet. Stand-pivot to toilet with min A.

## 2020-05-12 NOTE — PROGRESS NOTES
ACUTE REHAB UNIT  SPEECH/LANGUAGE PATHOLOGY      [x] Daily  [] Weekly Care Conference Note  [] Discharge    Patient:Hanna Hanks     :1934  TONIO:[id number]  Room #: LXJ-8009/3475-64   Rehab Dx/Hx: Acute ischemic stroke [facility]) [I63.9]  Date of Admit: 2020      Precautions: falls and aspirations  Home situation: Pt lives at home with her . Pt was independent prior to admission   ST Dx: Expressive and receptive aphasia with apraxia component; Cognitive deficits; Dysphagia     Daily Treatment Info:   Date: 2020   Tx session 1   Tx session 2    Pain None reported  Denied    Subjective     Pt up in wheelchair. Recalled not feeling well yesterday but reported better today. Pt up in chair. Remained alert throughout session    Objective:  Goals     Pt will tolerate recommended diet and regular solids with no overt s/s of aspiration, noticeable fatigue or significant pocketing. Pt tolerated mixed consistency (meds with water) without observed s/s of aspiration. Took larger pills one at a time, smaller pills, several at once. Goal not targeted this session. Pt will improve comprehension of basic commands and yes/no questions to 70% accuracy via graded tasks. Identified word f/2 to compare 2 items   - 80% accuracy   - significantly improved attention to detail and comprehension of task    Responded to written questions via f/3 selection (subtest of ENEDELIA (Assessment of Language-Related Functional Activities), Reading Instructions)  - 50% accuracy   - poor attention to detail impacted accuracy      Pt will complete basic expressive language tasks (sentence completion, naming, automatics) to 70% accuracy with min cues     Addend: Patient will complete moderate level verbal description and word retrieval tasks with >80% acc min cues.     Abstract naming of low frequency words using initial letter stimulus   - 25% accuracy   - pt aware of errors, stated \"I'm not doing well with this\"

## 2020-05-13 LAB
ANION GAP SERPL CALCULATED.3IONS-SCNC: 6 MMOL/L (ref 3–16)
BUN BLDV-MCNC: 24 MG/DL (ref 7–20)
CALCIUM SERPL-MCNC: 9.2 MG/DL (ref 8.3–10.6)
CHLORIDE BLD-SCNC: 97 MMOL/L (ref 99–110)
CO2: 30 MMOL/L (ref 21–32)
CREAT SERPL-MCNC: <0.5 MG/DL (ref 0.6–1.2)
GFR AFRICAN AMERICAN: >60
GFR NON-AFRICAN AMERICAN: >60
GLUCOSE BLD-MCNC: 100 MG/DL (ref 70–99)
HCT VFR BLD CALC: 31.9 % (ref 36–48)
HEMOGLOBIN: 10.8 G/DL (ref 12–16)
MCH RBC QN AUTO: 28.7 PG (ref 26–34)
MCHC RBC AUTO-ENTMCNC: 33.9 G/DL (ref 31–36)
MCV RBC AUTO: 84.8 FL (ref 80–100)
PDW BLD-RTO: 14.8 % (ref 12.4–15.4)
PLATELET # BLD: 477 K/UL (ref 135–450)
PMV BLD AUTO: 7.7 FL (ref 5–10.5)
POTASSIUM SERPL-SCNC: 4.1 MMOL/L (ref 3.5–5.1)
RBC # BLD: 3.77 M/UL (ref 4–5.2)
SODIUM BLD-SCNC: 133 MMOL/L (ref 136–145)
WBC # BLD: 5.9 K/UL (ref 4–11)

## 2020-05-13 PROCEDURE — 6370000000 HC RX 637 (ALT 250 FOR IP): Performed by: PHYSICAL MEDICINE & REHABILITATION

## 2020-05-13 PROCEDURE — 97535 SELF CARE MNGMENT TRAINING: CPT

## 2020-05-13 PROCEDURE — 97130 THER IVNTJ EA ADDL 15 MIN: CPT

## 2020-05-13 PROCEDURE — 97129 THER IVNTJ 1ST 15 MIN: CPT

## 2020-05-13 PROCEDURE — 92526 ORAL FUNCTION THERAPY: CPT

## 2020-05-13 PROCEDURE — 97112 NEUROMUSCULAR REEDUCATION: CPT

## 2020-05-13 PROCEDURE — 97116 GAIT TRAINING THERAPY: CPT

## 2020-05-13 PROCEDURE — 80048 BASIC METABOLIC PNL TOTAL CA: CPT

## 2020-05-13 PROCEDURE — 6370000000 HC RX 637 (ALT 250 FOR IP): Performed by: UROLOGY

## 2020-05-13 PROCEDURE — 97530 THERAPEUTIC ACTIVITIES: CPT

## 2020-05-13 PROCEDURE — 6360000002 HC RX W HCPCS: Performed by: PHYSICAL MEDICINE & REHABILITATION

## 2020-05-13 PROCEDURE — 1280000000 HC REHAB R&B

## 2020-05-13 PROCEDURE — 85027 COMPLETE CBC AUTOMATED: CPT

## 2020-05-13 PROCEDURE — 92507 TX SP LANG VOICE COMM INDIV: CPT

## 2020-05-13 RX ADMIN — FAMOTIDINE 20 MG: 20 TABLET ORAL at 21:47

## 2020-05-13 RX ADMIN — BETHANECHOL CHLORIDE 10 MG: 10 TABLET ORAL at 07:44

## 2020-05-13 RX ADMIN — NITROFURANTOIN MONOHYDRATE/MACROCRYSTALLINE 100 MG: 25; 75 CAPSULE ORAL at 07:45

## 2020-05-13 RX ADMIN — CLOPIDOGREL 75 MG: 75 TABLET, FILM COATED ORAL at 07:45

## 2020-05-13 RX ADMIN — BETHANECHOL CHLORIDE 10 MG: 10 TABLET ORAL at 21:48

## 2020-05-13 RX ADMIN — ASPIRIN 81 MG: 81 TABLET, COATED ORAL at 07:44

## 2020-05-13 RX ADMIN — ENOXAPARIN SODIUM 40 MG: 40 INJECTION SUBCUTANEOUS at 07:50

## 2020-05-13 RX ADMIN — DESMOPRESSIN ACETATE 40 MG: 0.2 TABLET ORAL at 21:47

## 2020-05-13 RX ADMIN — FAMOTIDINE 20 MG: 20 TABLET ORAL at 07:45

## 2020-05-13 RX ADMIN — ESCITALOPRAM OXALATE 5 MG: 10 TABLET ORAL at 07:45

## 2020-05-13 RX ADMIN — METHYLPHENIDATE HYDROCHLORIDE 10 MG: 10 TABLET ORAL at 13:45

## 2020-05-13 RX ADMIN — TAMSULOSIN HYDROCHLORIDE 0.4 MG: 0.4 CAPSULE ORAL at 07:44

## 2020-05-13 RX ADMIN — NITROFURANTOIN MONOHYDRATE/MACROCRYSTALLINE 100 MG: 25; 75 CAPSULE ORAL at 21:47

## 2020-05-13 RX ADMIN — BETHANECHOL CHLORIDE 10 MG: 10 TABLET ORAL at 13:45

## 2020-05-13 RX ADMIN — METHYLPHENIDATE HYDROCHLORIDE 10 MG: 10 TABLET ORAL at 06:31

## 2020-05-13 RX ADMIN — TRAMADOL HYDROCHLORIDE 50 MG: 50 TABLET, FILM COATED ORAL at 06:30

## 2020-05-13 RX ADMIN — AMLODIPINE BESYLATE 10 MG: 5 TABLET ORAL at 07:44

## 2020-05-13 RX ADMIN — LOSARTAN POTASSIUM 100 MG: 100 TABLET, FILM COATED ORAL at 07:45

## 2020-05-13 RX ADMIN — CALCIUM 500 MG: 500 TABLET ORAL at 07:45

## 2020-05-13 ASSESSMENT — PAIN SCALES - GENERAL
PAINLEVEL_OUTOF10: 0
PAINLEVEL_OUTOF10: 1
PAINLEVEL_OUTOF10: 4
PAINLEVEL_OUTOF10: 0

## 2020-05-13 NOTE — PATIENT CARE CONFERENCE
[address]  Inpatient Rehabilitation  Weekly Team Conference Note    Patient Name: Hanh Islas        MRN: [id number]    : 1934  (80 y.o.)  Gender: female      Diagnosis: L frontal CVA     The team conference for this patient was held on 20 at 11:00am by:  Juan De Leon MD    CASE MANAGEMENT:  Assessment: Pt lives with spouse at home in a two story house with a full bathroom on first floor, no home care prior. Pt already has a walker and wheelchair at home. Pt was an active  prior. Dtr is uncertain if pt has advance directives in place, no documents in Epic. PSYCHOLOGY:  Assessment:     PHYSICAL THERAPY:    Bed Mobility:   Scooting: Contact guard assistance(cues for technique and hand placement)    Transfers:  Sit to Stand: Minimal Assistance  Stand to sit: Minimal Assistance(max verbal cues for hand placement)  Bed to Chair: Moderate assistance  .       Ambulation 1  Surface: level tile  Device: Rolling Walker  Assistance: Min A  Quality of Gait: Slow, steady wes with forward flexed trunk  Distance: 79'      Stairs  # Steps : 4  Stairs Height: 4\"  Rails: Bilateral  Assistance: Minimal assistance  Comment: Min A and cues for sequence; able to ascend leading L or R    QM:  Roll Left and Right  Assistance Needed: Partial/moderate assistance  CARE Score: 3  Discharge Goal: Independent  Sit to Lying  Assistance Needed: Substantial/maximal assistance  CARE Score: 2  Discharge Goal: Supervision or touching assistance  Lying to Sitting on Side of Bed  Assistance Needed: Partial/moderate assistance  CARE Score: 3  Discharge Goal: Supervision or touching assistance  Sit to Stand  Assistance Needed: Substantial/maximal assistance  Reason if not Attempted: Not attempted due to medical condition or safety concerns  CARE Score: 2  Discharge Goal: Supervision or touching assistance  Chair/Bed-to-Chair Transfer  Assistance Needed: Substantial/maximal assistance  CARE Score: 2  Discharge Goal: Supervision or touching assistance  Car Transfer  Reason if not Attempted: Not attempted due to medical condition or safety concerns  CARE Score: 88  Discharge Goal: Supervision or touching assistance  Walk 10 Feet  Assistance Needed: Partial/moderate assistance  Reason if not Attempted: Not attempted due to medical condition or safety concerns  CARE Score: 3  Discharge Goal: Supervision or touching assistance  Walk 50 Feet with Two Turns  Assistance Needed: Partial/moderate assistance  Reason if not Attempted: Not applicable  CARE Score: 3  Walk 150 Feet  Assistance Needed: Partial/moderate assistance  Reason if not Attempted: Not applicable  CARE Score: 3  Walking 10 Feet on Uneven Surfaces  Reason if not Attempted: Not attempted due to medical condition or safety concerns  CARE Score: 88  Discharge Goal: Partial/moderate assistance  1 Step (Curb)  Assistance Needed: Dependent  Reason if not Attempted: Not applicable  CARE Score: 1  4 Steps  Assistance Needed: Partial/moderate assistance  Reason if not Attempted: Not applicable  CARE Score: 3  12 Steps  Reason if not Attempted: Not applicable  CARE Score: 9  Picking Up Object  Reason if not Attempted: Not attempted due to medical condition or safety concerns  CARE Score: 88  Discharge Goal: Supervision or touching assistance  Wheelchair Ability  Uses a Wheelchair and/or Scooter?: No    SPEECH THERAPY:    Diet Level:Dietary Nutrition Supplements: Standard High Calorie Oral Supplement  DIET GENERAL;    Assessment: Pt progressing significantly towards goals. Attention improving but remains inconsistent and impacts accuracy. Word finding is still evident during structured tasks in addition to a motor planning component. Orientation has improved however, based upon attention, pt is intermittently disoriented in conversation.  Reception is limited by hearing deficits, comprehension appears to be improving in functional tasks, although pt outcomes: aphasia, cognition   Patient Goals:None stated, does not state    Rehab Team Members in attendance for Team Conference:  Karthik Shin, MSW, LSW  Briseida Castaneda LD Dessie Pretty, OTR/KENNY Pedersen Ar, PT, CLT, NDT    Karey Cook M.A., January Nava, MOHSEN    [facility], [address] [place] [address],     I approve the established interdisciplinary plan of care as documented within the medical record of [facility].     Adilson Gonzales MD  Electronically signed by Adilson Gonzales MD on 5/14/2020 at 11:27 AM

## 2020-05-13 NOTE — PROGRESS NOTES
Physical Therapy  Facility/Department: [facility] ACUTE REHAB UNIT  Daily Treatment Note  NAME: Tony Elder  : 1934  MRN: [id number]    Date of Service: 2020    Discharge Recommendations:  Patient would benefit from continued therapy after discharge, S Level 3, 24 hour supervision or assist   PT Equipment Recommendations  Other: Has W/c and RW at home; additional needs to be addressed based on progress    Assessment   Body structures, Functions, Activity limitations: Decreased functional mobility ; Decreased ROM; Decreased strength;Decreased ADL status; Decreased safe awareness;Decreased cognition;Decreased endurance;Decreased sensation;Decreased balance;Decreased fine motor control;Decreased coordination;Decreased posture  Assessment: Progressing with gait and transfers. Treatment Diagnosis: RLE weakness, impaired balance, ambulation deficits  Prognosis: Fair;Good  Patient Education:  & : Transfers, midline alignment. Pt need reinforcement.  - : Transfers. Pt able to follow one step commands, needs reinforcement. : Gait - needs single step commands and reinforcement. : Transfers - needs reinforcement. : Stairs, gait, transfers: continues to require single step commands for functional tasks : Review of progress; pt verbalizes understanding. Barriers to Learning: Cognition,communication  REQUIRES PT FOLLOW UP: Yes  Activity Tolerance  Activity Tolerance: Patient Tolerated treatment well     Patient Diagnosis(es): Stroke   has a past medical history of Arthritis, CAD (coronary artery disease), GERD (gastroesophageal reflux disease), Hx of blood clots, Hyperlipidemia, and Hypertension. has a past surgical history that includes Cholecystectomy; Appendectomy; Varicose vein surgery; Endoscopy, colon, diagnostic; eye surgery; joint replacement; and Breast surgery.     Restrictions  Restrictions/Precautions  Restrictions/Precautions: Fall Risk  Required Braces or Orthoses?: No  Position Activity Restriction  Other position/activity restrictions: Mimi Phelps is a 80 y.o. female who presented to the emergency department  for evaluation for right leg weakness. The patient has a history of hypertension and hyperlipidemia. To ARU 4/20/20  Subjective   General  Chart Reviewed: Yes  Additional Pertinent Hx: CAD, DVT, R TSA, HTN  Response To Previous Treatment: Patient with no complaints from previous session. Family / Caregiver Present: No  Subjective  Subjective: No c/o; agreeable to PT  General Comment  Comments: Pt alone in bathroom upon PT's arrival.   Pain Screening  Patient Currently in Pain: Denies       Orientation  Orientation  Overall Orientation Status: Impaired     Objective   Bed mobility  Supine to Sit: Moderate assistance  Transfers  Sit to Stand: Minimal Assistance  Stand to sit: Minimal Assistance  Bed to Chair: Moderate assistance  Stand Pivot Transfers: Minimal Assistance  Lateral Transfers: Minimal Assistance     Ambulation  Ambulation?: Yes  More Ambulation?: Yes  Ambulation 1  Surface: level tile  Device: Rolling Walker  Assistance: Minimal assistance  Quality of Gait: Improved balance,  slow, steady gait. Distance: 79' x 1 , 41' x 1      Comment: 1st session: Found on toilet. Stood for dependent otf care and dependent clothing management. Toilet transfer with mod A 1. Performed standing reach activities in parallel bars progressing to L toe taps with TC at R knee. Performed seated sensory ball rolls with improved control RLE and hip flex each LE. Performed side stepping L and R at [facility] with min A; pt became confused midway through lateral stepping L and required mod verbal cues to correct. Returned to room and left up in chair with Chair alarm activated, call light and needs in reach     2nd session: Pt sleeping in w/c. Agreeable to PT with no c/o.   Seated stepper x 8 minutes for ROM and reciprocal patterns with decreased assist to stay on

## 2020-05-13 NOTE — PROGRESS NOTES
Occupational Therapy  Facility/Department: [facility] ACUTE REHAB UNIT  Daily Treatment Note  NAME: Michoacano Gray  : 1934  MRN: [id number]    Date of Service: 2020    Discharge Recommendations:  24 hour supervision or assist, S Level 3  OT Equipment Recommendations  Other: continue to assess    Assessment   Performance deficits / Impairments: Decreased functional mobility ; Decreased ADL status; Decreased cognition;Decreased safe awareness;Decreased endurance;Decreased vision/visual deficit; Decreased balance;Decreased high-level IADLs  Assessment: Patient presents with the above deficits impacting occupational performance and functioning below baseline level. Recommend skilled OT to address deficits and promote functional independence. Able to ambulate to bathroom with CGA/min A with RW today   Treatment Diagnosis: Decreased functional mobility, ADL status, decreased safety, decreased cognition, decreased balance and endurance associated with CVA  Prognosis: Fair;Good  OT Education: Transfer Training;OT Role;ADL Adaptive Strategies; Plan of Care;Orientation  Patient Education: pt would benefit from continued reinforcement  Barriers to Learning: cognition, aphasia   REQUIRES OT FOLLOW UP: Yes  Activity Tolerance  Activity Tolerance: Patient Tolerated treatment well  Safety Devices  Safety Devices in place: Yes  Type of devices: (with RN on toilet at end of session )  Restraints  Initially in place: No         Patient Diagnosis(es): CVA     has a past medical history of Arthritis, CAD (coronary artery disease), GERD (gastroesophageal reflux disease), Hx of blood clots, Hyperlipidemia, and Hypertension. has a past surgical history that includes Cholecystectomy; Appendectomy; Varicose vein surgery; Endoscopy, colon, diagnostic; eye surgery; joint replacement; and Breast surgery.     Restrictions  Restrictions/Precautions  Restrictions/Precautions: Fall Risk  Required Braces or Orthoses?: No  Position Activity Westly Blizzard Ami Arbour OTR/KENNY [id number], 5/13/2020, 10:10 AM

## 2020-05-13 NOTE — PROGRESS NOTES
moderate to severe expressive and receptive aphasia with suspected apraxia component. Pt with difficulty initiating verbalization and inconsistent errors during evaluation. Pt was limited in responses to yes/no questions and when following basic commands. Pt made few intelligible verbalizations in response to automatic questions. Paraphasias were evident during naming tasks. Current Diet Order: Dietary Nutrition Supplements: Standard High Calorie Oral Supplement  DIET GENERAL;  * Assist feed/ set up *   Recommended Form of Meds: Whole with water(One at a time )  Compensatory Swallowing Strategies: Small bites/sips, Eat/Feed slowly, No straws, Upright as possible for all oral intake     Plan:   Frequency:  5days/week   60 minutes/day    Discharge Recommendations:   Barriers: Communication deficits; Awareness; Initiation; Attention   Discharge Recommendations:  [] Home independently  [] Home with assistance [x]  24 hour supervision  [] SNF [] Other:  Continued SLP Treatment:  [x] Yes [] No [] TBD based on progress while on ARU [] Vital Stim indicated [] Other:   Estimated discharge date: 5/20/20    Type of Total Treatment Minutes   Session 1   Session 2   Time In 0900 1350   Time Out 0930 1420   Timed Code Minutes  20 10   Individual Treatment Minutes  30 30   Co-Treatment Minutes      Group Treatment Minutes      Concurrent Treatment Minutes        TOTAL DAILY MINUTES: 60     Electronically Signed by     Vida Morales M.A.  CCC-SLP ENRRIQUE [id number]  Speech-Language Pathologist [phone]  5/13/2020 12:31 PM

## 2020-05-13 NOTE — DISCHARGE INSTR - COC
Equipment (for information only, NOT a DME order):  walker  Other Treatments: HOME HEALTH CARE: LEVEL 3 SAFETY        -Initial home health evaluation to occur within 24-48 hours, in patient home    -Home health agency to establish plan of care for patient over 60 day period    -Medication Reconciliation    -PT/OT/Speech evaluations in home within 24-48 hours of discharge; including  -DME and home safety    -Frontload therapy 5 days, then 3x a week    -OT to evaluate if patient has [address] needs for personal care    - evaluation within 24-48 hours, includes evaluation of resources   and insurance to determine [place], [place], LTC, and Medicaid options    -PCP Visit scheduled within three to seven days of discharge    -Telehealth-Homecare Vitals(If patient is agreeable and meets guidelines)        Patient's personal belongings (please select all that are sent with patient):  clothes, flowers, cards, pictures    RN SIGNATURE:  Electronically signed by Bebeto Anne RN on 5/20/20 at 12:13 PM EDT    CASE MANAGEMENT/SOCIAL WORK SECTION    Inpatient Status Date: 4/20/2020    Readmission Risk Assessment Score:  Readmission Risk              Risk of Unplanned Readmission:        14           Discharging to Facility/ Agency   Name: Niko Torres will call for Appointment  Phone: [phone]  Fax: [phone]    / signature: Electronically signed by CHARLES Mosqueda, LSW on 5/19/20 at 9:38 AM EDT    PHYSICIAN SECTION    Prognosis: Fair    Condition at Discharge: Stable    Rehab Potential (if transferring to Rehab): Fair    Recommended Labs or Other Treatments After Discharge: PT/OT/SLP/RN/Aide/MSW. Physician Certification: I certify the above information and transfer of Rosa Zee  is necessary for the continuing treatment of the diagnosis listed and that she requires Home Care for greater 30 days.      Update Admission H&P: No change in H&P    PHYSICIAN SIGNATURE:  Electronically signed by Dennis Santana MD on 5/19/20 at 12:04 PM EDT

## 2020-05-14 PROCEDURE — 92526 ORAL FUNCTION THERAPY: CPT

## 2020-05-14 PROCEDURE — 97530 THERAPEUTIC ACTIVITIES: CPT

## 2020-05-14 PROCEDURE — 1280000000 HC REHAB R&B

## 2020-05-14 PROCEDURE — 6370000000 HC RX 637 (ALT 250 FOR IP): Performed by: PHYSICAL MEDICINE & REHABILITATION

## 2020-05-14 PROCEDURE — 97116 GAIT TRAINING THERAPY: CPT

## 2020-05-14 PROCEDURE — 6370000000 HC RX 637 (ALT 250 FOR IP): Performed by: UROLOGY

## 2020-05-14 PROCEDURE — 97130 THER IVNTJ EA ADDL 15 MIN: CPT

## 2020-05-14 PROCEDURE — 97535 SELF CARE MNGMENT TRAINING: CPT

## 2020-05-14 PROCEDURE — 97129 THER IVNTJ 1ST 15 MIN: CPT

## 2020-05-14 PROCEDURE — 6360000002 HC RX W HCPCS: Performed by: PHYSICAL MEDICINE & REHABILITATION

## 2020-05-14 PROCEDURE — 92507 TX SP LANG VOICE COMM INDIV: CPT

## 2020-05-14 RX ADMIN — AMLODIPINE BESYLATE 10 MG: 5 TABLET ORAL at 07:56

## 2020-05-14 RX ADMIN — ASPIRIN 81 MG: 81 TABLET, COATED ORAL at 07:56

## 2020-05-14 RX ADMIN — NITROFURANTOIN MONOHYDRATE/MACROCRYSTALLINE 100 MG: 25; 75 CAPSULE ORAL at 07:56

## 2020-05-14 RX ADMIN — CALCIUM 500 MG: 500 TABLET ORAL at 07:56

## 2020-05-14 RX ADMIN — CLOPIDOGREL 75 MG: 75 TABLET, FILM COATED ORAL at 07:56

## 2020-05-14 RX ADMIN — BETHANECHOL CHLORIDE 10 MG: 10 TABLET ORAL at 12:33

## 2020-05-14 RX ADMIN — NITROFURANTOIN MONOHYDRATE/MACROCRYSTALLINE 100 MG: 25; 75 CAPSULE ORAL at 22:15

## 2020-05-14 RX ADMIN — DESMOPRESSIN ACETATE 40 MG: 0.2 TABLET ORAL at 22:15

## 2020-05-14 RX ADMIN — LOSARTAN POTASSIUM 100 MG: 100 TABLET, FILM COATED ORAL at 07:56

## 2020-05-14 RX ADMIN — METHYLPHENIDATE HYDROCHLORIDE 10 MG: 10 TABLET ORAL at 12:33

## 2020-05-14 RX ADMIN — TAMSULOSIN HYDROCHLORIDE 0.4 MG: 0.4 CAPSULE ORAL at 07:56

## 2020-05-14 RX ADMIN — BETHANECHOL CHLORIDE 10 MG: 10 TABLET ORAL at 22:15

## 2020-05-14 RX ADMIN — FAMOTIDINE 20 MG: 20 TABLET ORAL at 22:15

## 2020-05-14 RX ADMIN — ENOXAPARIN SODIUM 40 MG: 40 INJECTION SUBCUTANEOUS at 07:55

## 2020-05-14 RX ADMIN — FAMOTIDINE 20 MG: 20 TABLET ORAL at 07:56

## 2020-05-14 RX ADMIN — BETHANECHOL CHLORIDE 10 MG: 10 TABLET ORAL at 07:56

## 2020-05-14 RX ADMIN — METHYLPHENIDATE HYDROCHLORIDE 10 MG: 10 TABLET ORAL at 06:58

## 2020-05-14 RX ADMIN — ESCITALOPRAM OXALATE 5 MG: 10 TABLET ORAL at 07:55

## 2020-05-14 NOTE — CARE COORDINATION
Team conference/Weekly Summary     Team conference held today. Spoke with pt's son and he reports himself and pt's dtr are working but taking turns care giving for pt's . Son reports they are looking into a private duty home care agency that a relative used in the past. Discussed doing family training Monday, that works for son- he requested this worker call pt's house phone to speak with pt's dtr to see if that works for her. Called house phone and no answer and no voicemail available. Will attempt again later as schedule permits. Estimated discharge date set for 5/20.  Son requested covid test or antibody test, message left for MD. Brennan Farmer, MSW, LSW

## 2020-05-14 NOTE — PROGRESS NOTES
Problem List   Diagnosis    Acute cerebral infarction ([facility]    Monoparesis of leg ([facility]    Expressive aphasia    Essential hypertension    Cerebrovascular disease    ASHD (arteriosclerotic heart disease)    Ataxia    Acute ischemic stroke ([facility]       Plan:   Acute ischemic infarcts: ASA, plavix, statin. PT/OT for right hemiparesis. SLP for cognition. Increased stroke burden and decline in physical status upon initial admission to ARU. Neuro evaluated and suggested continued DAPT Tx. Started ritalin for stimulation. Started lexapro for motor recovery.      SHIELA stenosis: as above     HTN: norvasc 10, losartan 100     HLD: lipitor 40     GERD: pepcid 20    Proteus UTI: Cipro completed. Retention remains. Now with Staph epi, Started macrobid    Urinary retention: started flomax and urecholine, urology consulted. Placed santana as pt still requiring ICs. Required urethral stretching in past per son. - Void trial today.     Bowels: Per protocol  Bladder: Per protocol   Sleep: Trazodone provided prn. Pain: tylenol, tramadol PRN   DVT PPx: Lovenox     Team conference was held today on the patient and discussed directly with the patient utilizing their entire treatment team. Please see separate team note for details. Total treatment time for today's care >33 min. Magdi Guthrie MD 5/14/2020, 9:35 AM    * This document was created using dictation software. While all precautions were taken to ensure accuracy, errors may have occurred. Please disregard any typographical errors.

## 2020-05-14 NOTE — PROGRESS NOTES
aids/ strategies to state orientation to time, place, situation with 100% accuracy across 3 consecutive sessions. Oriented  - facility  - situation  - month  - date and estephanie (with use of clock in room)  - year    -Intermittent confusion noted in conversation, Pt initially stated she lives at home with her \"mom and dad\" however Pt quickly self-corrected    -Difficulty with stating where she lives, suspect word finding deficits were contributing     GOAL MET - Continue to monitor as pt has been intermittently disoriented in conversation   GOAL MET - Continue to monitor as pt has been intermittently disoriented in conversation     Other areas targeted:     Education: Provided education to pt re: rationale for speech therapy and plan of care, requires ongoing learning. Provided education to pt re: rationale for speech therapy and plan of care, requires ongoing learning. Safety Devices:    X   Call light within reach  [x]? Chair alarm activated  []? Bed alarm activated  []? Other:   X   Call light within reach  []? Chair alarm activated  [x]? Bed alarm activated  []? 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 Pt with difficulty initiating verbalization and inconsistent errors during evaluation. Pt was limited in responses to yes/no questions and when following basic commands. Pt made few intelligible verbalizations in response to automatic questions. Paraphasias were evident during naming tasks.      Current Diet Order: Dietary Nutrition Supplements: Standard High Calorie Oral Supplement  DIET GENERAL;  * Assist feed/ set up *   Recommended Form of Meds: Whole with water(One at a time )  Compensatory Swallowing Strategies: Small bites/sips, Eat/Feed slowly, No straws, Upright as possible for all oral intake     Plan:   Frequency:  5days/week   60 minutes/day    Discharge Recommendations:   Barriers: Communication deficits; Awareness; Initiation; Attention   Discharge Recommendations:  [] Home independently  [] Home with assistance [x]  24 hour supervision  [] SNF [] Other:  Continued SLP Treatment:  [x] Yes [] No [] TBD based on progress while on ARU [] Vital Stim indicated [] Other:   Estimated discharge date: 5/20/20    Type of Total Treatment Minutes   Session 1   Session 2   Time In 0800 1245   Time Out 0830 1315   Timed Code Minutes  0 30   Individual Treatment Minutes  30 30   Co-Treatment Minutes      Group Treatment Minutes      Concurrent Treatment Minutes        TOTAL DAILY MINUTES: 60     Electronically Signed by     Treatment session 1:   Melva Krabbe, M.A., [address] [place] [address]  Speech-Language Pathologist    Treatment session 2:  Laura Westfall #[id number] 5/14/2020 3:06 PM  Speech-Language Pathologist

## 2020-05-15 LAB
ANION GAP SERPL CALCULATED.3IONS-SCNC: 9 MMOL/L (ref 3–16)
BUN BLDV-MCNC: 17 MG/DL (ref 7–20)
CALCIUM SERPL-MCNC: 9.1 MG/DL (ref 8.3–10.6)
CHLORIDE BLD-SCNC: 99 MMOL/L (ref 99–110)
CO2: 28 MMOL/L (ref 21–32)
CREAT SERPL-MCNC: <0.5 MG/DL (ref 0.6–1.2)
GFR AFRICAN AMERICAN: >60
GFR NON-AFRICAN AMERICAN: >60
GLUCOSE BLD-MCNC: 110 MG/DL (ref 70–99)
HCT VFR BLD CALC: 32.3 % (ref 36–48)
HEMOGLOBIN: 10.9 G/DL (ref 12–16)
MCH RBC QN AUTO: 28.7 PG (ref 26–34)
MCHC RBC AUTO-ENTMCNC: 33.6 G/DL (ref 31–36)
MCV RBC AUTO: 85.5 FL (ref 80–100)
PDW BLD-RTO: 14.1 % (ref 12.4–15.4)
PLATELET # BLD: 423 K/UL (ref 135–450)
PMV BLD AUTO: 7.5 FL (ref 5–10.5)
POTASSIUM SERPL-SCNC: 3.7 MMOL/L (ref 3.5–5.1)
RBC # BLD: 3.78 M/UL (ref 4–5.2)
SODIUM BLD-SCNC: 136 MMOL/L (ref 136–145)
WBC # BLD: 6.4 K/UL (ref 4–11)

## 2020-05-15 PROCEDURE — 6370000000 HC RX 637 (ALT 250 FOR IP): Performed by: PHYSICAL MEDICINE & REHABILITATION

## 2020-05-15 PROCEDURE — 85027 COMPLETE CBC AUTOMATED: CPT

## 2020-05-15 PROCEDURE — 92507 TX SP LANG VOICE COMM INDIV: CPT

## 2020-05-15 PROCEDURE — 97116 GAIT TRAINING THERAPY: CPT

## 2020-05-15 PROCEDURE — 97130 THER IVNTJ EA ADDL 15 MIN: CPT

## 2020-05-15 PROCEDURE — 97535 SELF CARE MNGMENT TRAINING: CPT

## 2020-05-15 PROCEDURE — 6360000002 HC RX W HCPCS: Performed by: PHYSICAL MEDICINE & REHABILITATION

## 2020-05-15 PROCEDURE — 97129 THER IVNTJ 1ST 15 MIN: CPT

## 2020-05-15 PROCEDURE — 80048 BASIC METABOLIC PNL TOTAL CA: CPT

## 2020-05-15 PROCEDURE — 1280000000 HC REHAB R&B

## 2020-05-15 PROCEDURE — 97530 THERAPEUTIC ACTIVITIES: CPT

## 2020-05-15 PROCEDURE — 6370000000 HC RX 637 (ALT 250 FOR IP): Performed by: UROLOGY

## 2020-05-15 RX ADMIN — METHYLPHENIDATE HYDROCHLORIDE 10 MG: 10 TABLET ORAL at 12:08

## 2020-05-15 RX ADMIN — AMLODIPINE BESYLATE 10 MG: 5 TABLET ORAL at 07:48

## 2020-05-15 RX ADMIN — DESMOPRESSIN ACETATE 40 MG: 0.2 TABLET ORAL at 22:35

## 2020-05-15 RX ADMIN — NITROFURANTOIN MONOHYDRATE/MACROCRYSTALLINE 100 MG: 25; 75 CAPSULE ORAL at 22:35

## 2020-05-15 RX ADMIN — FAMOTIDINE 20 MG: 20 TABLET ORAL at 22:35

## 2020-05-15 RX ADMIN — ESCITALOPRAM OXALATE 5 MG: 10 TABLET ORAL at 07:48

## 2020-05-15 RX ADMIN — BETHANECHOL CHLORIDE 10 MG: 10 TABLET ORAL at 07:47

## 2020-05-15 RX ADMIN — LOSARTAN POTASSIUM 100 MG: 100 TABLET, FILM COATED ORAL at 07:48

## 2020-05-15 RX ADMIN — BETHANECHOL CHLORIDE 10 MG: 10 TABLET ORAL at 12:08

## 2020-05-15 RX ADMIN — CALCIUM 500 MG: 500 TABLET ORAL at 07:48

## 2020-05-15 RX ADMIN — CLOPIDOGREL 75 MG: 75 TABLET, FILM COATED ORAL at 07:48

## 2020-05-15 RX ADMIN — NITROFURANTOIN MONOHYDRATE/MACROCRYSTALLINE 100 MG: 25; 75 CAPSULE ORAL at 07:48

## 2020-05-15 RX ADMIN — TAMSULOSIN HYDROCHLORIDE 0.4 MG: 0.4 CAPSULE ORAL at 07:48

## 2020-05-15 RX ADMIN — ONDANSETRON 4 MG: 4 TABLET, ORALLY DISINTEGRATING ORAL at 09:25

## 2020-05-15 RX ADMIN — BETHANECHOL CHLORIDE 10 MG: 10 TABLET ORAL at 22:35

## 2020-05-15 RX ADMIN — ENOXAPARIN SODIUM 40 MG: 40 INJECTION SUBCUTANEOUS at 07:47

## 2020-05-15 RX ADMIN — METHYLPHENIDATE HYDROCHLORIDE 10 MG: 10 TABLET ORAL at 06:21

## 2020-05-15 RX ADMIN — FAMOTIDINE 20 MG: 20 TABLET ORAL at 07:48

## 2020-05-15 RX ADMIN — ASPIRIN 81 MG: 81 TABLET, COATED ORAL at 07:48

## 2020-05-15 ASSESSMENT — PAIN DESCRIPTION - ORIENTATION: ORIENTATION: RIGHT

## 2020-05-15 ASSESSMENT — PAIN SCALES - GENERAL: PAINLEVEL_OUTOF10: 2

## 2020-05-15 ASSESSMENT — PAIN DESCRIPTION - LOCATION: LOCATION: KNEE

## 2020-05-15 ASSESSMENT — PAIN DESCRIPTION - ONSET: ONSET: ON-GOING

## 2020-05-15 ASSESSMENT — PAIN DESCRIPTION - PAIN TYPE: TYPE: CHRONIC PAIN

## 2020-05-15 NOTE — PROGRESS NOTES
Physical Therapy  Facility/Department: [facility]  Daily Treatment Note  NAME: Ruth Ann Go  : 1934  MRN: [id number]    Date of Service: 5/15/2020    Discharge Recommendations:  Patient would benefit from continued therapy after discharge, S Level 3, 24 hour supervision or assist   PT Equipment Recommendations  Other: Has W/c and RW at home; additional needs to be addressed based on progress    Assessment   Body structures, Functions, Activity limitations: Decreased functional mobility ; Decreased ROM; Decreased strength;Decreased ADL status; Decreased safe awareness;Decreased cognition;Decreased endurance;Decreased sensation;Decreased balance;Decreased fine motor control;Decreased coordination;Decreased posture  Assessment: Pt continues to progress physically; achieving all short-term functional goals. Pt remains limited by cognition and variable safety awareness. Pt would benefit from continued skilled PT to improve function  Treatment Diagnosis: RLE weakness, impaired balance, ambulation deficits  Prognosis: Fair;Good  Barriers to Learning: Cognition,communication  REQUIRES PT FOLLOW UP: Yes  Activity Tolerance  Activity Tolerance: Patient Tolerated treatment well     Patient Diagnosis(es): Stroke   has a past medical history of Arthritis, CAD (coronary artery disease), GERD (gastroesophageal reflux disease), Hx of blood clots, Hyperlipidemia, and Hypertension. has a past surgical history that includes Cholecystectomy; Appendectomy; Varicose vein surgery; Endoscopy, colon, diagnostic; eye surgery; joint replacement; and Breast surgery. Restrictions  Restrictions/Precautions  Restrictions/Precautions: Fall Risk  Required Braces or Orthoses?: No  Position Activity Restriction  Other position/activity restrictions: Ruth Ann Go is a 80 y.o. female who presented to the emergency department  for evaluation for right leg weakness.   The patient has a history of hypertension and hyperlipidemia. To ARU 4/20/20     Subjective   General  Chart Reviewed: Yes  Additional Pertinent Hx: CAD, DVT, R TSA, HTN  Response To Previous Treatment: Patient with no complaints from previous session. Family / Caregiver Present: No  Subjective  Subjective: Pt slept well; no c/o pain  General Comment  Comments: Pt up in recliner, finishing breakfast  Pain Screening  Patient Currently in Pain: Denies       Orientation  Orientation  Overall Orientation Status: Impaired  Orientation Level: Oriented to place;Oriented to time;Oriented to person;Disoriented to situation     Objective   Transfers  Sit to Stand: Minimal Assistance  Stand to sit: Minimal Assistance  Stand Pivot Transfers: Minimal Assistance  Lateral Transfers: Minimal Assistance    Ambulation 1  Surface: level tile  Device: Rolling Walker  Assistance: Contact guard assistance;Minimal assistance  Quality of Gait: Slow, steady gait  Distance: 10' x 1, 105', 111'     Comment: 1st session: Ambulated to bathroom as above. Toileted with assist for otf care and clothing. Ambulation as above. Seated ex: iso hip ADD, reciprocal marching, LAQ.      2nd session: Pt on toilet with OT. Toileted with assist for otf care. Stood at sink for hand washing (forgot to wash hands) but brushed teeth with CGA. Pt went to turn to walk away from sink and had 1 LOB requiring min A to correct. Seated stepper x 8 minutes for reciprocal patterns. Returned to room and transferred to bed with SBA. Air overlay inflated, call light and needs in reach.          Goals  Short term goals  Time Frame for Short term goals: 1 week  Short term goal 1: Pt will perform bed mobilty with Mod A 1: goal met  Short term goal 2: Pt will transfer bed to chair with Mod A 2: goal met  Short term goal 3: Pt will tolerate sitting EOB for functional task x 5 minutes with CGA: goal met  Short term goal 4: Pt will  parallel bars with Mod A 2: goal met  Long term goals  Time Frame for Long term goals : 3 weeks  Long term goal 1: Pt will be mod I for bed mobility  Long term goal 2: Pt will transfer bed to chair with CGA  Long term goal 3: Pt will propel w/c 48' with Mod I  Long term goal 4: Pt will ambulate 22' with AAD and CGA  Long term goal 5: Pt will ascend/descend ramp with Min A  Patient Goals   Patient goals : None stated    Plan    Plan  Times per week: 60 minutes on 5 days/week  Plan weeks: 3-4 weeks  Current Treatment Recommendations: Strengthening, ROM, Balance Training, Functional Mobility Training, Transfer Training, Endurance Training, Wheelchair Mobility Training, Gait Training, Stair training, Neuromuscular Re-education, Pain Management, Safety Education & Training, Home Exercise Program, Patient/Caregiver Education & Training, Equipment Evaluation, Education, & procurement, Modalities, Positioning  Plan Comment: SW to arrange family/caregiver training session  Safety Devices  Type of devices: Left in chair, Call light within reach, Chair alarm in place  Restraints  Initially in place: No     Therapy Time   Individual Concurrent Group Co-treatment   Time In 0900         Time Out 0930         Minutes 30              Second Session Therapy Time:   Individual Concurrent Group Co-treatment   Time In 1245         Time Out 1315         Minutes 30           Timed Code Treatment Minutes:  30, 30    Total Treatment Minutes:  Christopher Ramirez,  C/NDT  Judith Palacios, PT

## 2020-05-15 NOTE — PROGRESS NOTES
surgery; joint replacement; and Breast surgery. Restrictions  Restrictions/Precautions  Restrictions/Precautions: Fall Risk  Required Braces or Orthoses?: No  Position Activity Restriction  Other position/activity restrictions: [place] Gray is a 80 y.o. female who presented to the emergency department  for evaluation for right leg weakness. The patient has a history of hypertension and hyperlipidemia. To ARU 4/20/20  Subjective   General  Chart Reviewed: Yes  Patient assessed for rehabilitation services?: Yes  Additional Pertinent Hx: HTN, HLD, CAD, arthritis  Response to previous treatment: Patient with no complaints from previous session  Family / Caregiver Present: No  Diagnosis: CVA  Subjective  Subjective: pt in bed on arrival - denies pain - agreeable to session - agreeable to showering  General Comment  Comments: . Orientation  Orientation  Orientation Level: Oriented to situation;Oriented to person;Oriented to place  Objective    ADL  Grooming: Supervision;Setup(hand washing in standing )  UE Bathing: Stand by assistance;Setup;Verbal cueing; Increased time to complete  LE Bathing: Minimal assistance;Verbal cueing; Increased time to complete(assist with pericare )  UE Dressing: Minimal assistance(pull over shirt )  LE Dressing: Maximum assistance(assist to thread pants, assist to ashish stockings and shoes - pt able to assist with pulling over hips)  Toileting: Maximum assistance(assist with clothing management and pericare )  Additional Comments: pt seated in w/c on arrival - ambulated to/from bathroom with RW and CGA - completed showering and dressing from shower chair in walk in shower - requesting to use toilet - CGA ambulating to sink to wash hands and to return to w/c - provided with built up  for pen and states it is easier to complete word finds with  on pen         Toilet Transfers  Toilet - Technique: Ambulating  Equipment Used: Standard toilet  Toilet Transfer:  Moderate assistance  Toilet Transfers Comments: to standard toilet after shower due to use of BSC as shower chair, pt with increased difficulty with sit to/from stand off lower surface of toilet   Shower Transfers  Shower - Transfer From: Mara Citizen - Transfer Type: To and From  Shower - Transfer To: Shower seat with back  Shower Transfers: Contact Guard  Shower Transfers Comments: RW to/from shower chair             Plan   Plan  Times per week: 60 minutes 5 days per week   Times per day: Daily  Specific instructions for Next Treatment: no longer requires cotx   Current Treatment Recommendations: Functional Mobility Training, Endurance Training, Safety Education & Training, Self-Care / ADL, Home Management Training    Goals  Short term goals  Time Frame for Short term goals: 1 week   Short term goal 1: mod A of 1 functional transfers-  Short term goal 2: mod A of 1 UB bathing/dressing-  Short term goal 3: max A of 1 LB bathing/dressing-   Long term goals  Time Frame for Long term goals : 3 weeks   Long term goal 1: CGA functional transfers   Long term goal 2: min A UB bathing/dressing, set up grooming  Long term goal 3: mod A LB bathing/dressing and toileting   Long term goal 4: w/c mobility for ADL and IADL tasks mod I   Long term goal 5: min A simple IADLs from w/c level   Patient Goals   Patient goals : does not state       Therapy Time   Individual Concurrent Group Co-treatment   Time In  0930         Time Out  1030         Minutes  60          Total minutes 60  Timed minutes [address], OT   Leo Fox OTR/L [id number], 5/15/2020, 7:32 AM

## 2020-05-15 NOTE — PROGRESS NOTES
Rosi Paniagua  5/15/2020  [id number]    Chief Complaint: Acute ischemic stroke ([address]    Subjective:   No overnight events. No current complaints. Participating well in therapy. Urinating well without need for ICs following santana removal yesterday. ROS: No CP, SOB, dyspnea    Objective:  Patient Vitals for the past 24 hrs:   BP Temp Temp src Pulse Resp SpO2   05/15/20 0745 133/70 98.2 °F (36.8 °C) Oral 100 16 95 %   05/14/20 2223 133/70 98.2 °F (36.8 °C) Oral 84 16 96 %     Gen: No distress, pleasant. Resting in bedside chair  HEENT: Normocephalic, atraumatic   CV: Regular rate and rhythm. No MRG   Resp: No respiratory distress. CTAB   Abd: Soft, nontender   Ext: No edema. OA changes at BL knees  Neuro: Alert, oriented x2, aphasia, higher-level cognitive deficits appreciated, appropriately interactive. Laboratory data: Available via EMR. Therapy progress:  PT  Position Activity Restriction  Other position/activity restrictions: Rosi Paniagua is a 80 y.o. female who presented to the emergency department  for evaluation for right leg weakness. The patient has a history of hypertension and hyperlipidemia.  To ARU 4/20/20  Objective     Sit to Stand: Minimal Assistance  Stand to sit: Minimal Assistance  Bed to Chair: Minimal assistance  Device: Rolling Walker  Assistance: Minimal assistance, Contact guard assistance  Distance: 10' x 2 (to/from bathroom first session); 80' x 1 2nd session  OT  PT Equipment Recommendations  Equipment Needed: No  Other: Has W/c and RW at home; additional needs to be addressed based on progress  Toilet - Technique: Ambulating  Equipment Used: Standard toilet  Toilet Transfers Comments: to standard toilet after shower due to use of BS as shower chair, pt with increased difficulty with sit to/from stand off lower surface of toilet   Assessment        SLP  Current Diet : Regular  Current Liquid Diet : Thin  Diet Solids Recommendation: Dysphagia Soft and Bite-Sized (Dysphagia

## 2020-05-15 NOTE — PROGRESS NOTES
surgery. Restrictions  Restrictions/Precautions  Restrictions/Precautions: Fall Risk  Required Braces or Orthoses?: No  Position Activity Restriction  Other position/activity restrictions: Felisha Ann is a 80 y.o. female who presented to the emergency department  for evaluation for right leg weakness. The patient has a history of hypertension and hyperlipidemia. To ARU 4/20/20  Subjective   General  Chart Reviewed: Yes  Patient assessed for rehabilitation services?: Yes  Additional Pertinent Hx: HTN, HLD, CAD, arthritis  Response to previous treatment: Patient with no complaints from previous session  Family / Caregiver Present: No  Diagnosis: CVA  Subjective  Subjective: pt in chair on arrival - states her stomach is bothering her, belching and endorses nausea but then c/o stomach cramps and passing gas - after RN provided Neelima Blankenship and attempting to toilet pt asking to return to bed   General Comment  Comments: . Orientation  Orientation  Overall Orientation Status: Impaired  Orientation Level: Oriented to situation;Oriented to person;Oriented to place  Objective    ADL  Grooming: Supervision;Setup(washing hands at sink )  UE Bathing: Stand by assistance;Setup;Verbal cueing; Increased time to complete  LE Bathing: Minimal assistance;Verbal cueing; Increased time to complete(assist with pericare )  UE Dressing: Minimal assistance(pull over shirt )  LE Dressing: Maximum assistance(assist to thread pants, assist to ashish stockings and shoes - pt able to assist with pulling over hips)  Toileting: Minimal assistance(pt able to pull pants up/down and wipe with assist to tie pants)  Additional Comments: therapist assist to ashish stockings, pt donned shoes with mod A - ambulated to bathroom with RW and CGA to toilet, cues to reach for C arms prior to sitting - assist to change brief - ambulated to sink and washed hands in standing with CGA - returned to chair end of session - min A approaching chair to keep walker close and get all the way to chair prior to sitting - pt continued to complain of stomach discomfort and requesting to return to bed end of session         Toilet Transfers  Toilet - Technique: Ambulating  Equipment Used: Standard toilet  Toilet Transfer: Minimal assistance  Toilet Transfers Comments: to standard toilet after shower due to use of BS as shower chair, pt with increased difficulty with sit to/from stand off lower surface of toilet   Shower Transfers  Shower - Transfer From: Walker  Shower - Transfer Type: To and From  Shower - Transfer To: Shower seat with back  Shower Transfers: Contact Guard  Shower Transfers Comments: RW to/from shower chair   Bed mobility  Sit to Supine:  Moderate assistance  Transfers  Stand Step Transfers: Contact guard assistance       Pt in chair on arrival for second session - pt folded laundry from w/c set up and extra time, good use of R hand but continues to have limited strength/ROM at R shoulder, compensates well for decreased strength during ADl/IADL tasks - ambulated to toilet with RW and CGA - CGA for clothing management, CGA pericare- pt able to doff pants SBA - mod A to thread pants and underwear - session ended on toilet with PT assisting patient       Plan   Plan  Times per week: 60 minutes 5 days per week   Times per day: Daily  Specific instructions for Next Treatment: no longer requires cotx   Current Treatment Recommendations: Functional Mobility Training, Endurance Training, Safety Education & Training, Self-Care / ADL, Home Management Training    Goals  Short term goals  Time Frame for Short term goals: 1 week   Short term goal 1: mod A of 1 functional transfers- dependent (max A of 1, mod A of 1) 4/23  Short term goal 2: mod A of 1 UB bathing/dressing- max A 4/23  Short term goal 3: max A of 1 LB bathing/dressing- max A to dependent 4/23  Long term goals  Time Frame for Long term goals : 3 weeks   Long term goal 1: CGA functional transfers   Long term goal 2: min A UB bathing/dressing, set up grooming  Long term goal 3: mod A LB bathing/dressing and toileting   Long term goal 4: w/c mobility for ADL and IADL tasks mod I   Long term goal 5: min A simple IADLs from w/c level   Patient Goals   Patient goals : does not state       Therapy Time   Individual Concurrent Group Co-treatment   Time In  0915         Time Out  0950         Minutes  35           Second Session Therapy Time:   Individual Concurrent Group Co-treatment   Time In 1225        Time Out 1250        Minutes 25             Total minutes 35 + 25   Timed minutes Yoselin Mejia, OT   Leo Caruso OTR/L [id number], 5/15/2020, 10:14 AM

## 2020-05-15 NOTE — PROGRESS NOTES
ACUTE REHAB UNIT  SPEECH/LANGUAGE PATHOLOGY      [x] Daily  [] Weekly Care Conference Note  [] Discharge    Patient:Hanna Hicks     :1934  IFL:[id number]  Room #: PMY-4475/4967-63   Rehab Dx/Hx: Acute ischemic stroke [facility]) [I63.9]  Date of Admit: 2020      Precautions: falls and aspirations  Home situation: Pt lives at home with her . Pt was independent prior to admission   ST Dx: Expressive and receptive aphasia with apraxia component; Cognitive deficits; Dysphagia     Daily Treatment Info:   Date: 5/15/2020   Tx session 1 Tx session 2    Pain Denied  Denied    Subjective     Pt up in chair for session. Alert and attentive. Reduced comprehension/ responsiveness to cues  Pt in bed but willing to sit up in chair for session. Alert and attentive despite slower processing with cognitive tasks. Objective:  Goals     Pt will tolerate recommended diet and regular solids with no overt s/s of aspiration, noticeable fatigue or significant pocketing. Goal not targeted this session. Goal not targeted this session. Pt will improve comprehension of basic commands and yes/no questions to 70% accuracy via graded tasks. Goal not targeted this session. Goal not targeted this session. Pt will complete basic expressive language tasks (sentence completion, naming, automatics) to 70% accuracy with min cues     Addend: Patient will complete moderate level verbal description and word retrieval tasks with >80% acc min cues.     Filling in missing letters of words in given category   - 60% accuracy   - mod cues to achieve 100%     Verbal description (home situation)   - pt with moderate errors in description, without awareness   - pt required max cues to comprehend difference between  vs father (reported she lives with her  and father, provided description of father that matches husbands, with the except of first name)  - the errors appear related to cognition / orientation vs language errors given her response to cueing and limited awareness of the errors    Verbal description of 4 step sequences   - required max cues to provide sequential story   - pt with intermittent perseverative errors and     Description of picture exclusion from category f/4  - 80% accuracy for identifying the excluded item   - max cues for providing description of the similarities of remaining items   - required increased time and modeling cues initially      Pt will complete graded problem-solving tasks with >80% accuracy. Goal not targeted this session. Picture sequence f/4   - 0% accuracy   - required max cues for comprehension of directions   - able to identify errors given mod-max cues      Pt will communicate basic wants/ needs and personal information utilizing multi-modalities with < mod cues. GOAL MET      GOAL MET    Pt will use visual aids/ strategies to state orientation to time, place, situation with 100% accuracy across 3 consecutive sessions. GOAL MET - Continue to monitor as pt has been intermittently disoriented in conversation    Oriented:   - place   - situation   - month   - year  - day of week    GOAL MET - Continue to monitor as pt has been intermittently disoriented in conversation     Other areas targeted:     Education: Provided education to pt re: rationale for speech therapy and plan of care, requires ongoing learning. Provided education to pt re: rationale for speech therapy and plan of care, requires ongoing learning. Safety Devices:    X   Call light within reach  [x]? Chair alarm activated  []? Bed alarm activated  []? Other:   X   Call light within reach  []? Chair alarm activated  [x]? Bed alarm activated  []?  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 Pt with difficulty initiating verbalization and inconsistent errors during evaluation. Pt was limited in responses to yes/no questions and when following basic commands. Pt made few intelligible verbalizations in response to automatic questions. Paraphasias were evident during naming tasks. Current Diet Order: Dietary Nutrition Supplements: Standard High Calorie Oral Supplement  DIET GENERAL;  * Assist feed/ set up *   Recommended Form of Meds: Whole with water(One at a time )  Compensatory Swallowing Strategies: Small bites/sips, Eat/Feed slowly, No straws, Upright as possible for all oral intake     Plan:   Frequency:  5days/week   60 minutes/day    Discharge Recommendations:   Barriers: Communication deficits; Awareness; Initiation; Attention   Discharge Recommendations:  [] Home independently  [] Home with assistance [x]  24 hour supervision  [] SNF [] Other:  Continued SLP Treatment:  [x] Yes [] No [] TBD based on progress while on ARU [] Vital Stim indicated [] Other:   Estimated discharge date: 5/20/20    Type of Total Treatment Minutes   Session 1   Session 2   Time In 0800 1125   Time Out 0830 1155   Timed Code Minutes  15 30   Individual Treatment Minutes  30 30   Co-Treatment Minutes      Group Treatment Minutes      Concurrent Treatment Minutes        TOTAL DAILY MINUTES: 60     Electronically Signed by     Chandler Sullivan M.A.  [facility]-SLP SMaximilianoPMaximiliano Lozada Pathologist [phone]  5/15/2020 1:30 PM

## 2020-05-16 PROCEDURE — 6360000002 HC RX W HCPCS: Performed by: PHYSICAL MEDICINE & REHABILITATION

## 2020-05-16 PROCEDURE — 1280000000 HC REHAB R&B

## 2020-05-16 PROCEDURE — 6370000000 HC RX 637 (ALT 250 FOR IP): Performed by: UROLOGY

## 2020-05-16 PROCEDURE — 6370000000 HC RX 637 (ALT 250 FOR IP): Performed by: PHYSICAL MEDICINE & REHABILITATION

## 2020-05-16 RX ADMIN — ESCITALOPRAM OXALATE 5 MG: 10 TABLET ORAL at 09:39

## 2020-05-16 RX ADMIN — METHYLPHENIDATE HYDROCHLORIDE 10 MG: 10 TABLET ORAL at 13:15

## 2020-05-16 RX ADMIN — METHYLPHENIDATE HYDROCHLORIDE 10 MG: 10 TABLET ORAL at 06:42

## 2020-05-16 RX ADMIN — CLOPIDOGREL 75 MG: 75 TABLET, FILM COATED ORAL at 09:39

## 2020-05-16 RX ADMIN — AMLODIPINE BESYLATE 10 MG: 5 TABLET ORAL at 09:39

## 2020-05-16 RX ADMIN — TAMSULOSIN HYDROCHLORIDE 0.4 MG: 0.4 CAPSULE ORAL at 09:39

## 2020-05-16 RX ADMIN — ENOXAPARIN SODIUM 40 MG: 40 INJECTION SUBCUTANEOUS at 09:41

## 2020-05-16 RX ADMIN — BETHANECHOL CHLORIDE 10 MG: 10 TABLET ORAL at 13:16

## 2020-05-16 RX ADMIN — ASPIRIN 81 MG: 81 TABLET, COATED ORAL at 09:39

## 2020-05-16 RX ADMIN — BETHANECHOL CHLORIDE 10 MG: 10 TABLET ORAL at 09:40

## 2020-05-16 RX ADMIN — CALCIUM 500 MG: 500 TABLET ORAL at 09:40

## 2020-05-16 RX ADMIN — LOSARTAN POTASSIUM 100 MG: 100 TABLET, FILM COATED ORAL at 09:39

## 2020-05-16 RX ADMIN — FAMOTIDINE 20 MG: 20 TABLET ORAL at 09:39

## 2020-05-16 RX ADMIN — NITROFURANTOIN MONOHYDRATE/MACROCRYSTALLINE 100 MG: 25; 75 CAPSULE ORAL at 09:39

## 2020-05-16 ASSESSMENT — PAIN SCALES - GENERAL
PAINLEVEL_OUTOF10: 0

## 2020-05-16 NOTE — PLAN OF CARE
Problem: SAFETY  Goal: LTG - patient will adhere to hip precautions during ADL's and transfers  5/16/2020 1233 by Ofelia Espitia RN  Outcome: Ongoing  Goal: LTG - Patient will demonstrate safety requirements appropriate to situation/environment  5/16/2020 1233 by Ofelia Espitia RN  Outcome: Ongoing  Goal: LTG - patient will utilize safety techniques  5/16/2020 1233 by Ofelia Espitia RN  Outcome: Ongoing  Goal: STG - patient locks brakes on wheelchair  5/16/2020 1233 by Ofelia Espitia RN  Outcome: Ongoing  Goal: STG - Patient uses call light consistently to request assistance with transfers  5/16/2020 1233 by Ofelia Espitia RN  Outcome: Ongoing  Goal: STG - patient uses gait belt during all transfers  5/16/2020 1233 by Ofelia Espitia RN  Outcome: Ongoing

## 2020-05-16 NOTE — PROGRESS NOTES
Yin Russo  5/16/2020  [id number]    Chief Complaint: Acute ischemic stroke ([address]    Subjective:   No overnight events. No current complaints. Participating well in therapy. Continues to void spontaneously following santana removal.    ROS: No CP, SOB, dyspnea    Objective:  Patient Vitals for the past 24 hrs:   BP Temp Temp src Pulse Resp SpO2   05/15/20 2230 134/70 98.1 °F (36.7 °C) Oral 85 16 95 %     Gen: No distress, pleasant. Resting in bedside chair  HEENT: Normocephalic, atraumatic   CV: Regular rate and rhythm. No MRG   Resp: No respiratory distress. CTAB   Abd: Soft, nontender   Ext: No edema. OA changes at BL knees  Neuro: Alert, oriented x2, aphasia, higher-level cognitive deficits appreciated, appropriately interactive. Laboratory data: Available via EMR. Therapy progress:  PT  Position Activity Restriction  Other position/activity restrictions: Yin Russo is a 80 y.o. female who presented to the emergency department  for evaluation for right leg weakness. The patient has a history of hypertension and hyperlipidemia. To ARU 4/20/20  Objective     Sit to Stand: Minimal Assistance  Stand to sit: Minimal Assistance  Bed to Chair: Minimal assistance  Device: Rolling Walker  Assistance: Contact guard assistance, Minimal assistance  Distance: 10' x 1, 105', 111'  OT  PT Equipment Recommendations  Equipment Needed: No  Other: Has W/c and RW at home; additional needs to be addressed based on progress  Toilet - Technique: Ambulating  Equipment Used: Standard toilet  Toilet Transfers Comments: to standard toilet after shower due to use of BS as shower chair, pt with increased difficulty with sit to/from stand off lower surface of toilet   Assessment        SLP  Current Diet : Regular  Current Liquid Diet : Thin  Diet Solids Recommendation: Dysphagia Soft and Bite-Sized (Dysphagia III)  Liquid Consistency Recommendation: Thin    Body mass index is 26.38 kg/m².     Assessment:  Patient Active

## 2020-05-17 LAB
REPORT: NORMAL
SARS-COV-2: NOT DETECTED
THIS TEST SENT TO: NORMAL

## 2020-05-17 PROCEDURE — 6370000000 HC RX 637 (ALT 250 FOR IP): Performed by: PHYSICAL MEDICINE & REHABILITATION

## 2020-05-17 PROCEDURE — U0003 INFECTIOUS AGENT DETECTION BY NUCLEIC ACID (DNA OR RNA); SEVERE ACUTE RESPIRATORY SYNDROME CORONAVIRUS 2 (SARS-COV-2) (CORONAVIRUS DISEASE [COVID-19]), AMPLIFIED PROBE TECHNIQUE, MAKING USE OF HIGH THROUGHPUT TECHNOLOGIES AS DESCRIBED BY CMS-2020-01-R: HCPCS

## 2020-05-17 PROCEDURE — 1280000000 HC REHAB R&B

## 2020-05-17 PROCEDURE — 6360000002 HC RX W HCPCS: Performed by: PHYSICAL MEDICINE & REHABILITATION

## 2020-05-17 RX ADMIN — AMLODIPINE BESYLATE 10 MG: 5 TABLET ORAL at 08:30

## 2020-05-17 RX ADMIN — FAMOTIDINE 20 MG: 20 TABLET ORAL at 17:27

## 2020-05-17 RX ADMIN — CALCIUM 500 MG: 500 TABLET ORAL at 08:30

## 2020-05-17 RX ADMIN — CLOPIDOGREL 75 MG: 75 TABLET, FILM COATED ORAL at 08:30

## 2020-05-17 RX ADMIN — ENOXAPARIN SODIUM 40 MG: 40 INJECTION SUBCUTANEOUS at 08:29

## 2020-05-17 RX ADMIN — ESCITALOPRAM OXALATE 5 MG: 10 TABLET ORAL at 08:30

## 2020-05-17 RX ADMIN — ASPIRIN 81 MG: 81 TABLET, COATED ORAL at 08:30

## 2020-05-17 RX ADMIN — TAMSULOSIN HYDROCHLORIDE 0.4 MG: 0.4 CAPSULE ORAL at 08:30

## 2020-05-17 RX ADMIN — NITROFURANTOIN MONOHYDRATE/MACROCRYSTALLINE 100 MG: 25; 75 CAPSULE ORAL at 08:30

## 2020-05-17 RX ADMIN — DESMOPRESSIN ACETATE 40 MG: 0.2 TABLET ORAL at 17:27

## 2020-05-17 RX ADMIN — LOSARTAN POTASSIUM 100 MG: 100 TABLET, FILM COATED ORAL at 08:30

## 2020-05-17 RX ADMIN — METHYLPHENIDATE HYDROCHLORIDE 10 MG: 10 TABLET ORAL at 13:25

## 2020-05-17 RX ADMIN — NITROFURANTOIN MONOHYDRATE/MACROCRYSTALLINE 100 MG: 25; 75 CAPSULE ORAL at 17:28

## 2020-05-17 RX ADMIN — FAMOTIDINE 20 MG: 20 TABLET ORAL at 08:30

## 2020-05-17 RX ADMIN — METHYLPHENIDATE HYDROCHLORIDE 10 MG: 10 TABLET ORAL at 06:42

## 2020-05-17 ASSESSMENT — PAIN SCALES - GENERAL
PAINLEVEL_OUTOF10: 0

## 2020-05-17 NOTE — PROGRESS NOTES
Govind Rangel  5/17/2020  [id number]    Chief Complaint: Acute ischemic stroke ([address]    Subjective:   No overnight events. No current complaints. \"I didn't expect to see you today\". Cognition continues to improve. Continues to void spontaneously following santana removal.    ROS: No CP, SOB, dyspnea    Objective:  Patient Vitals for the past 24 hrs:   BP Temp Temp src Pulse Resp SpO2   05/17/20 0744 130/71 98.3 °F (36.8 °C) Oral 75 17 93 %   05/16/20 2259 132/71 98.7 °F (37.1 °C) Oral 92 16 96 %   05/16/20 0932 133/77 97.9 °F (36.6 °C) Oral 93 17 94 %     Gen: No distress, pleasant. Resting in bed  HEENT: Normocephalic, atraumatic   CV: Regular rate and rhythm. No MRG   Resp: No respiratory distress. CTAB   Abd: Soft, nontender   Ext: No edema. OA changes at BL knees  Neuro: Alert, oriented x2, aphasia, higher-level cognitive deficits appreciated, appropriately interactive. Laboratory data: Available via EMR. Therapy progress:  PT  Position Activity Restriction  Other position/activity restrictions: Govind Rangel is a 80 y.o. female who presented to the emergency department  for evaluation for right leg weakness. The patient has a history of hypertension and hyperlipidemia.  To ARU 4/20/20  Objective     Sit to Stand: Minimal Assistance  Stand to sit: Minimal Assistance  Bed to Chair: Minimal assistance  Device: Rolling Walker  Assistance: Contact guard assistance, Minimal assistance  Distance: 10' x 1, 105', 111'  OT  PT Equipment Recommendations  Equipment Needed: No  Other: Has W/c and RW at home; additional needs to be addressed based on progress  Toilet - Technique: Ambulating  Equipment Used: Standard toilet  Toilet Transfers Comments: to standard toilet after shower due to use of BSC as shower chair, pt with increased difficulty with sit to/from stand off lower surface of toilet   Assessment        SLP  Current Diet : Regular  Current Liquid Diet : Thin  Diet Solids Recommendation: Dysphagia Soft

## 2020-05-18 LAB
ANION GAP SERPL CALCULATED.3IONS-SCNC: 9 MMOL/L (ref 3–16)
BUN BLDV-MCNC: 21 MG/DL (ref 7–20)
CALCIUM SERPL-MCNC: 9.6 MG/DL (ref 8.3–10.6)
CHLORIDE BLD-SCNC: 97 MMOL/L (ref 99–110)
CO2: 29 MMOL/L (ref 21–32)
CREAT SERPL-MCNC: 0.6 MG/DL (ref 0.6–1.2)
GFR AFRICAN AMERICAN: >60
GFR NON-AFRICAN AMERICAN: >60
GLUCOSE BLD-MCNC: 111 MG/DL (ref 70–99)
HCT VFR BLD CALC: 35.4 % (ref 36–48)
HEMOGLOBIN: 11.7 G/DL (ref 12–16)
MCH RBC QN AUTO: 28.1 PG (ref 26–34)
MCHC RBC AUTO-ENTMCNC: 33 G/DL (ref 31–36)
MCV RBC AUTO: 85 FL (ref 80–100)
PDW BLD-RTO: 14.9 % (ref 12.4–15.4)
PLATELET # BLD: 463 K/UL (ref 135–450)
PMV BLD AUTO: 7.6 FL (ref 5–10.5)
POTASSIUM SERPL-SCNC: 3.7 MMOL/L (ref 3.5–5.1)
RBC # BLD: 4.16 M/UL (ref 4–5.2)
SODIUM BLD-SCNC: 135 MMOL/L (ref 136–145)
WBC # BLD: 5.1 K/UL (ref 4–11)

## 2020-05-18 PROCEDURE — 36415 COLL VENOUS BLD VENIPUNCTURE: CPT

## 2020-05-18 PROCEDURE — 6360000002 HC RX W HCPCS: Performed by: PHYSICAL MEDICINE & REHABILITATION

## 2020-05-18 PROCEDURE — 97535 SELF CARE MNGMENT TRAINING: CPT

## 2020-05-18 PROCEDURE — 97116 GAIT TRAINING THERAPY: CPT

## 2020-05-18 PROCEDURE — 97130 THER IVNTJ EA ADDL 15 MIN: CPT

## 2020-05-18 PROCEDURE — 85027 COMPLETE CBC AUTOMATED: CPT

## 2020-05-18 PROCEDURE — 80048 BASIC METABOLIC PNL TOTAL CA: CPT

## 2020-05-18 PROCEDURE — 6370000000 HC RX 637 (ALT 250 FOR IP): Performed by: PHYSICAL MEDICINE & REHABILITATION

## 2020-05-18 PROCEDURE — 97530 THERAPEUTIC ACTIVITIES: CPT

## 2020-05-18 PROCEDURE — 92507 TX SP LANG VOICE COMM INDIV: CPT

## 2020-05-18 PROCEDURE — 1280000000 HC REHAB R&B

## 2020-05-18 PROCEDURE — 97129 THER IVNTJ 1ST 15 MIN: CPT

## 2020-05-18 RX ADMIN — ESCITALOPRAM OXALATE 5 MG: 10 TABLET ORAL at 08:43

## 2020-05-18 RX ADMIN — CALCIUM 500 MG: 500 TABLET ORAL at 08:44

## 2020-05-18 RX ADMIN — AMLODIPINE BESYLATE 10 MG: 5 TABLET ORAL at 08:44

## 2020-05-18 RX ADMIN — ENOXAPARIN SODIUM 40 MG: 40 INJECTION SUBCUTANEOUS at 08:43

## 2020-05-18 RX ADMIN — TAMSULOSIN HYDROCHLORIDE 0.4 MG: 0.4 CAPSULE ORAL at 08:47

## 2020-05-18 RX ADMIN — METHYLPHENIDATE HYDROCHLORIDE 10 MG: 10 TABLET ORAL at 06:40

## 2020-05-18 RX ADMIN — TRAZODONE HYDROCHLORIDE 50 MG: 50 TABLET ORAL at 22:49

## 2020-05-18 RX ADMIN — FAMOTIDINE 20 MG: 20 TABLET ORAL at 22:49

## 2020-05-18 RX ADMIN — CLOPIDOGREL 75 MG: 75 TABLET, FILM COATED ORAL at 08:44

## 2020-05-18 RX ADMIN — DESMOPRESSIN ACETATE 40 MG: 0.2 TABLET ORAL at 22:49

## 2020-05-18 RX ADMIN — FAMOTIDINE 20 MG: 20 TABLET ORAL at 08:46

## 2020-05-18 RX ADMIN — LOSARTAN POTASSIUM 100 MG: 100 TABLET, FILM COATED ORAL at 08:43

## 2020-05-18 RX ADMIN — ASPIRIN 81 MG: 81 TABLET, COATED ORAL at 08:44

## 2020-05-18 RX ADMIN — METHYLPHENIDATE HYDROCHLORIDE 10 MG: 10 TABLET ORAL at 12:18

## 2020-05-18 ASSESSMENT — PAIN SCALES - GENERAL: PAINLEVEL_OUTOF10: 0

## 2020-05-18 NOTE — PROGRESS NOTES
(Dysphagia III)  Liquid Consistency Recommendation: Thin    Body mass index is 26.38 kg/m². Assessment:  Patient Active Problem List   Diagnosis    Acute cerebral infarction ([address]    Monoparesis of leg ([address]    Expressive aphasia    Essential hypertension    Cerebrovascular disease    ASHD (arteriosclerotic heart disease)    Ataxia    [facility] [address]       Plan:   Acute ischemic infarcts: ASA, plavix, statin. PT/OT for right hemiparesis. SLP for cognition. Increased stroke burden and decline in physical status upon initial admission to ARU. Neuro evaluated and suggested continued DAPT Tx. Started ritalin for stimulation. Started lexapro for motor recovery.      SHIELA stenosis: as above     HTN: norvasc 10, losartan 100     HLD: lipitor 40     GERD: pepcid 20    Proteus UTI: Cipro completed. Retention remained and repeat Cx with Staph epi. Completed macrobid    Urinary retention: started flomax and urecholine, urology consulted. Placed santana as pt still requiring ICs. Required urethral stretching in past per son. - voiding well, D/C'd urecholine. - Consider d/c of flomax tomorrow if urinating well.     Bowels: Per protocol  Bladder: Per protocol   Sleep: Trazodone provided prn. Pain: tylenol, tramadol PRN   DVT PPx: Loveshan Flower MD 5/18/2020, 9:05 AM    * This document was created using dictation software. While all precautions were taken to ensure accuracy, errors may have occurred. Please disregard any typographical errors.

## 2020-05-19 PROCEDURE — 6360000002 HC RX W HCPCS: Performed by: PHYSICAL MEDICINE & REHABILITATION

## 2020-05-19 PROCEDURE — 97116 GAIT TRAINING THERAPY: CPT

## 2020-05-19 PROCEDURE — 1280000000 HC REHAB R&B

## 2020-05-19 PROCEDURE — 92507 TX SP LANG VOICE COMM INDIV: CPT

## 2020-05-19 PROCEDURE — 97530 THERAPEUTIC ACTIVITIES: CPT

## 2020-05-19 PROCEDURE — 6370000000 HC RX 637 (ALT 250 FOR IP): Performed by: PHYSICAL MEDICINE & REHABILITATION

## 2020-05-19 PROCEDURE — 97535 SELF CARE MNGMENT TRAINING: CPT

## 2020-05-19 PROCEDURE — 97130 THER IVNTJ EA ADDL 15 MIN: CPT

## 2020-05-19 PROCEDURE — 97129 THER IVNTJ 1ST 15 MIN: CPT

## 2020-05-19 RX ORDER — ASPIRIN 81 MG/1
81 TABLET ORAL DAILY
Qty: 30 TABLET | Refills: 3 | Status: SHIPPED | OUTPATIENT
Start: 2020-05-20

## 2020-05-19 RX ORDER — FAMOTIDINE 20 MG/1
20 TABLET, FILM COATED ORAL 2 TIMES DAILY
Qty: 60 TABLET | Refills: 3 | Status: SHIPPED | OUTPATIENT
Start: 2020-05-19

## 2020-05-19 RX ORDER — CLOPIDOGREL BISULFATE 75 MG/1
75 TABLET ORAL DAILY
Qty: 30 TABLET | Refills: 3 | Status: SHIPPED | OUTPATIENT
Start: 2020-05-19

## 2020-05-19 RX ORDER — AMLODIPINE BESYLATE 10 MG/1
10 TABLET ORAL DAILY
Qty: 30 TABLET | Refills: 3 | Status: SHIPPED | OUTPATIENT
Start: 2020-05-20

## 2020-05-19 RX ORDER — ESCITALOPRAM OXALATE 5 MG/1
5 TABLET ORAL DAILY
Qty: 30 TABLET | Refills: 3 | Status: SHIPPED | OUTPATIENT
Start: 2020-05-20

## 2020-05-19 RX ORDER — METHYLPHENIDATE HYDROCHLORIDE 10 MG/1
10 TABLET ORAL
Qty: 60 TABLET | Refills: 0 | Status: ON HOLD | OUTPATIENT
Start: 2020-05-19 | End: 2020-06-10 | Stop reason: SDUPTHER

## 2020-05-19 RX ADMIN — FAMOTIDINE 20 MG: 20 TABLET ORAL at 08:17

## 2020-05-19 RX ADMIN — TAMSULOSIN HYDROCHLORIDE 0.4 MG: 0.4 CAPSULE ORAL at 08:17

## 2020-05-19 RX ADMIN — AMLODIPINE BESYLATE 10 MG: 5 TABLET ORAL at 08:17

## 2020-05-19 RX ADMIN — LOSARTAN POTASSIUM 100 MG: 100 TABLET, FILM COATED ORAL at 08:16

## 2020-05-19 RX ADMIN — ESCITALOPRAM OXALATE 5 MG: 10 TABLET ORAL at 08:17

## 2020-05-19 RX ADMIN — ASPIRIN 81 MG: 81 TABLET, COATED ORAL at 08:16

## 2020-05-19 RX ADMIN — CALCIUM 500 MG: 500 TABLET ORAL at 08:16

## 2020-05-19 RX ADMIN — FAMOTIDINE 20 MG: 20 TABLET ORAL at 23:37

## 2020-05-19 RX ADMIN — ENOXAPARIN SODIUM 40 MG: 40 INJECTION SUBCUTANEOUS at 08:16

## 2020-05-19 RX ADMIN — METHYLPHENIDATE HYDROCHLORIDE 10 MG: 10 TABLET ORAL at 12:40

## 2020-05-19 RX ADMIN — DESMOPRESSIN ACETATE 40 MG: 0.2 TABLET ORAL at 23:37

## 2020-05-19 RX ADMIN — METHYLPHENIDATE HYDROCHLORIDE 10 MG: 10 TABLET ORAL at 06:03

## 2020-05-19 RX ADMIN — CLOPIDOGREL 75 MG: 75 TABLET, FILM COATED ORAL at 08:17

## 2020-05-19 ASSESSMENT — PAIN SCALES - GENERAL: PAINLEVEL_OUTOF10: 0

## 2020-05-19 NOTE — PROGRESS NOTES
Occupational Therapy  Facility/Department: [facility] ACUTE REHAB UNIT  Daily Treatment Note/Discharge Summary  NAME: Stormy Patricia  : 1934  MRN: [id number]    Date of Service: 2020    Discharge Recommendations:  24 hour supervision or assist, S Level 3, Home with Home health OT, Home with nursing aide  OT Equipment Recommendations  Other: will require tub bench and bedside commode, family aware     Assessment   Performance deficits / Impairments: Decreased functional mobility ; Decreased ADL status; Decreased cognition;Decreased safe awareness;Decreased endurance;Decreased vision/visual deficit; Decreased balance;Decreased high-level IADLs  Assessment: Patient presents with the above deficits impacting occupational performance and functioning below baseline level. Recommend skilled OT to address deficits and promote functional independence. All mobility CGA today except for tub transfer   Treatment Diagnosis: Decreased functional mobility, ADL status, decreased safety, decreased cognition, decreased balance and endurance associated with CVA  Prognosis: Fair;Good  OT Education: Transfer Training;OT Role;ADL Adaptive Strategies; Plan of Care;Orientation; Family Education  Patient Education: pt would benefit from continued reinforcement- family education provided  and family appears receptive and supported   Barriers to Learning: cognition, aphasia   REQUIRES OT FOLLOW UP: Yes  Activity Tolerance  Activity Tolerance: Patient Tolerated treatment well  Safety Devices  Safety Devices in place: Yes  Type of devices: Call light within reach; Chair alarm in place; Left in chair  Restraints  Initially in place: No         Patient Diagnosis(es): CVA     has a past medical history of Arthritis, CAD (coronary artery disease), GERD (gastroesophageal reflux disease), Hx of blood clots, Hyperlipidemia, and Hypertension. has a past surgical history that includes Cholecystectomy;  Appendectomy; Varicose vein surgery; Timed Code Treatment Minutes: 55 Minutes   Total minutes 55 (-5 minutes due to fatigue after showering and additional activities, extra time for OT yesterday)    Memory Maura, OT   Leo M.  Lei Sans OTR/KENNY [id number], 5/19/2020, 11:52 AM

## 2020-05-19 NOTE — PROGRESS NOTES
Mobility Training, Transfer Training, Endurance Training, Wheelchair Mobility Training, Gait Training, Stair training, Neuromuscular Re-education, Pain Management, Safety Education & Training, Home Exercise Program, Patient/Caregiver Education & Training, Equipment Evaluation, Education, & procurement, Modalities, Positioning  Plan Comment: SW to arrange family/caregiver training session  Safety Devices  Type of devices: Left in chair, Call light within reach, Chair alarm in place  Restraints  Initially in place: No     Therapy Time   Individual Concurrent Group Co-treatment   Time In 0745         Time Out 0803         Minutes 18              Second Session Therapy Time:   Individual Concurrent Group Co-treatment   Time In 1030         Time Out 1059         Minutes 29           Second Session Therapy Time:   Individual Concurrent Group Co-treatment   Time In 1338         Time Out 1408         Minutes 30             Timed Code Treatment Minutes:  18, 29, 30    Total Treatment Minutes:  Bhakti Montgomery,  C/KONRAD Guevara, PT

## 2020-05-19 NOTE — CARE COORDINATION
[address] [place] [address] received a referral to this patient for a rolling walker. Rolling walker has been delivered to the patients room. Thank you for the referral.  Electronically signed by Quentin Reinoso on 5/19/2020 at 12:35 PM  Cell ph# [phone]    NOTE: After 5:00 pm, Weekends, Holidays: Call [facility] at [phone] to coordinate delivery of home medical equipment.

## 2020-05-19 NOTE — PLAN OF CARE
Problem: Falls - Risk of:  Goal: Will remain free from falls  Description: Will remain free from falls  Outcome: Ongoing  Note: Pt remains free from falls. Safety precautions in place. Bed in lowest position, bed/chair wheels locked, call light with in reach, bed/chair alarm on,  fall risk wrist band on, SAFE outside of doorway. Will continue to monitor.

## 2020-05-19 NOTE — DISCHARGE SUMMARY
deficits and promote functional independence. All mobility CGA today except for tub transfer     Speech therapy:       History of Present Illness/Hospital Course:  80-year-old female with a history of CAD, GERD, HTN, HLD, and DVT who was admitted on 4/17 with right-sided weakness.  CT head without acute findings.  CTA with 50% stenosis of the left vertebral artery, severe stenosis of the bilateral SHIELA, and severe left PCA stenosis.  MRI revealed multiple cortical infarcts in the left frontal lobe within the left SHIELA territory.  Echo with normal EF and negative bubble study.  No A1c or LDL reported.  Neurology evaluated and suggested adding plavix to ASA. She was evaluated by therapy and suggested to continue in an inpatient setting prior to returning home. Inpatient Rehabilitation Course: Benoit Peñaloza is a 80 y.o. female admitted to inpatient rehabilitation on 4/20/2020 s/p Acute ischemic stroke ([facility] [address] The patient participated in an aggressive multidisciplinary inpatient rehabilitation program involving 3 hours of therapy per day, at least 5 days per week. She progressed very very well in therapy and was able to be discharged home with home health care. Her medical rehab course was significant for below:    Acute ischemic infarcts: ASA, plavix, statin. PT/OT for right hemiparesis. SLP for cognition. Increased stroke burden and decline in physical status upon initial admission to ARU. Neuro evaluated and suggested continued DAPT Tx. Started ritalin for stimulation. Started lexapro for motor recovery. Continue Ritalin and Lexapro at the time of discharge.     SHIELA stenosis: as above     HTN: norvasc 10, losartan 100 per home regimen.     HLD: lipitor 40     GERD: pepcid 20     Proteus UTI: Cipro completed. Retention remained and repeat Cx with Staph epi. Completed macrobid prior to discharge.     Urinary retention: started flomax and urecholine, urology consulted. Replaced santana as pt still requiring ICs. tablet  Commonly known as:  LIPITOR  Take 1 tablet by mouth nightly  Notes to patient:  Atorvastatin (Lipitor)  Use: lower cholesterol; prevent heart attack/stroke  Side effects: headache, muscle pain/weakness     Biotin 1000 MCG Tabs  Notes to patient:  Uses: as supplements for hepatitis, brittle nails, neuropathy  Side effects: seems to be safe and tolerated. calcium carbonate 500 MG Tabs tablet  Commonly known as:  OSCAL  Notes to patient:  Uses: pain, fever, and fever reducer, arthritis , backache. Side effects : nausea, vomiting, stoamch pain , itching     clopidogrel 75 MG tablet  Commonly known as:  PLAVIX  Take 1 tablet by mouth daily     famotidine 20 MG tablet  Commonly known as:  PEPCID  Take 1 tablet by mouth 2 times daily     losartan 100 MG tablet  Commonly known as:  COZAAR  Notes to patient:  Uses; to treat high blood pressure and to protect the kidneys. Side effect; dizziness and light headedness     MULTIVITAMIN ADULT PO  Notes to patient:  Use: Dietary supplement  Side Effects: Constipation, diarrhea, stool discoloration        STOP taking these medications    acetaminophen 500 MG tablet  Commonly known as:  TYLENOL     aspirin 81 MG chewable tablet  Replaced by:  aspirin 81 MG EC tablet     enoxaparin 40 MG/0.4ML injection  Commonly known as:  LOVENOX     meloxicam 7.5 MG tablet  Commonly known as:  Mobic           Where to Get Your Medications      You can get these medications from any pharmacy    Bring a paper prescription for each of these medications  · amLODIPine 10 MG tablet  · aspirin 81 MG EC tablet  · clopidogrel 75 MG tablet  · escitalopram 5 MG tablet  · famotidine 20 MG tablet  · methylphenidate 10 MG tablet         I spent over 36 minutes on this discharge encounter between counseling, coordination of care, and medication reconciliation. To comply with John SALDANA.II.[id number]:  Discharge order placed in advance to facilitate patient's discharge needs.     Juan De Leon

## 2020-05-19 NOTE — PROGRESS NOTES
(Dysphagia III)  Liquid Consistency Recommendation: Thin    Body mass index is 26.38 kg/m². Assessment:  Patient Active Problem List   Diagnosis    Acute cerebral infarction ([address]    Monoparesis of leg ([address]    Expressive aphasia    Essential hypertension    Cerebrovascular disease    ASHD (arteriosclerotic heart disease)    Ataxia    Acute ischemic stroke ([facility]       Plan:   Acute ischemic infarcts: ASA, plavix, statin. PT/OT for right hemiparesis. SLP for cognition. Increased stroke burden and decline in physical status upon initial admission to ARU. Neuro evaluated and suggested continued DAPT Tx. Started ritalin for stimulation. Started lexapro for motor recovery.      SHIELA stenosis: as above     HTN: norvasc 10, losartan 100     HLD: lipitor 40     GERD: pepcid 20    Proteus UTI: Cipro completed. Retention remained and repeat Cx with Staph epi. Completed macrobid    Urinary retention: started flomax and urecholine, urology consulted. Replaced santana as pt still requiring ICs. Required urethral stretching in past per son. - voiding well, D/C'd urecholine. - d/c flomax.     Bowels: Per protocol  Bladder: Per protocol   Sleep: Trazodone provided prn. Pain: tylenol, tramadol PRN   DVT PPx: Lovenox     Dispo: Prep d/c for tomorrow. Agnieszka Mendoza was evaluated today and a DME order was entered for a wheeled walker because she requires this to successfully complete daily living tasks of personal cares and ambulating. A wheeled walker is necessary due to the patient's unsteady gait, upper body weakness, and inability to  an ambulation device; and she can ambulate only by pushing a walker instead of a lesser assistive device such as a cane, crutch, or standard walker. The need for this equipment was discussed with the patient and she understands and is in agreement. Binu Manrique MD 5/19/2020, 9:39 AM    * This document was created using dictation software.   While all precautions were

## 2020-05-20 VITALS
HEIGHT: 63 IN | BODY MASS INDEX: 26.38 KG/M2 | OXYGEN SATURATION: 95 % | WEIGHT: 148.9 LBS | DIASTOLIC BLOOD PRESSURE: 69 MMHG | RESPIRATION RATE: 16 BRPM | HEART RATE: 95 BPM | TEMPERATURE: 97.8 F | SYSTOLIC BLOOD PRESSURE: 113 MMHG

## 2020-05-20 LAB
ANION GAP SERPL CALCULATED.3IONS-SCNC: 9 MMOL/L (ref 3–16)
BUN BLDV-MCNC: 19 MG/DL (ref 7–20)
CALCIUM SERPL-MCNC: 9.7 MG/DL (ref 8.3–10.6)
CHLORIDE BLD-SCNC: 97 MMOL/L (ref 99–110)
CO2: 30 MMOL/L (ref 21–32)
CREAT SERPL-MCNC: <0.5 MG/DL (ref 0.6–1.2)
GFR AFRICAN AMERICAN: >60
GFR NON-AFRICAN AMERICAN: >60
GLUCOSE BLD-MCNC: 113 MG/DL (ref 70–99)
HCT VFR BLD CALC: 36.3 % (ref 36–48)
HEMOGLOBIN: 11.9 G/DL (ref 12–16)
MCH RBC QN AUTO: 28.3 PG (ref 26–34)
MCHC RBC AUTO-ENTMCNC: 32.8 G/DL (ref 31–36)
MCV RBC AUTO: 86.3 FL (ref 80–100)
PDW BLD-RTO: 15 % (ref 12.4–15.4)
PLATELET # BLD: 433 K/UL (ref 135–450)
PMV BLD AUTO: 7.6 FL (ref 5–10.5)
POTASSIUM SERPL-SCNC: 3.7 MMOL/L (ref 3.5–5.1)
RBC # BLD: 4.2 M/UL (ref 4–5.2)
SODIUM BLD-SCNC: 136 MMOL/L (ref 136–145)
WBC # BLD: 5 K/UL (ref 4–11)

## 2020-05-20 PROCEDURE — 6370000000 HC RX 637 (ALT 250 FOR IP): Performed by: PHYSICAL MEDICINE & REHABILITATION

## 2020-05-20 PROCEDURE — 6360000002 HC RX W HCPCS: Performed by: PHYSICAL MEDICINE & REHABILITATION

## 2020-05-20 PROCEDURE — 36415 COLL VENOUS BLD VENIPUNCTURE: CPT

## 2020-05-20 PROCEDURE — 80048 BASIC METABOLIC PNL TOTAL CA: CPT

## 2020-05-20 PROCEDURE — 85027 COMPLETE CBC AUTOMATED: CPT

## 2020-05-20 RX ADMIN — LOSARTAN POTASSIUM 100 MG: 100 TABLET, FILM COATED ORAL at 10:37

## 2020-05-20 RX ADMIN — ASPIRIN 81 MG: 81 TABLET, COATED ORAL at 10:37

## 2020-05-20 RX ADMIN — METHYLPHENIDATE HYDROCHLORIDE 10 MG: 10 TABLET ORAL at 06:28

## 2020-05-20 RX ADMIN — FAMOTIDINE 20 MG: 20 TABLET ORAL at 10:36

## 2020-05-20 RX ADMIN — ENOXAPARIN SODIUM 40 MG: 40 INJECTION SUBCUTANEOUS at 10:38

## 2020-05-20 RX ADMIN — CALCIUM 500 MG: 500 TABLET ORAL at 10:36

## 2020-05-20 RX ADMIN — ESCITALOPRAM OXALATE 5 MG: 10 TABLET ORAL at 10:36

## 2020-05-20 RX ADMIN — CLOPIDOGREL 75 MG: 75 TABLET, FILM COATED ORAL at 10:36

## 2020-05-20 RX ADMIN — AMLODIPINE BESYLATE 10 MG: 5 TABLET ORAL at 10:37

## 2020-05-20 ASSESSMENT — PAIN SCALES - GENERAL: PAINLEVEL_OUTOF10: 0

## 2020-05-20 NOTE — PROGRESS NOTES
CLINICAL PHARMACY NOTE: [address]S TO [address] Select Patient?: No  Total # of Prescriptions Filled: 6   The following medications were delivered to the patient:  · Clopidogrel  · Asa 81  · Methylphenidate 10  · Famotidine 20  · norvasc 10  Total # of Interventions Completed: 0  Time Spent (min): 30    Additional Documentation:  Patients son picked up in OP

## 2020-05-20 NOTE — CARE COORDINATION
Provided pt with IMM Letter, pt initialed and dated the IMM Letter then this worker copied document. Pt received a copy of IMM Letter and original placed on chart with stickers. All questions answered and support provided.  Laura Griggs, MSW, LSW

## 2020-05-21 NOTE — PROGRESS NOTES
Patient seen , discharge dictated scripts given , arrangements made , ABDI completed .  Discussed with nursing staff  And   If applicable ,  Discussed with  Patient's family , all questions answered and concerns addressed  When applicable

## 2020-06-06 ENCOUNTER — HOSPITAL ENCOUNTER (OUTPATIENT)
Age: 85
Setting detail: SPECIMEN
Discharge: HOME | End: 2020-06-06
Payer: MEDICARE

## 2020-06-06 LAB
BILIRUBIN URINE: NEGATIVE
BLOOD, URINE: NEGATIVE
CLARITY: ABNORMAL
COLOR: YELLOW
EPITHELIAL CELLS, UA: 3 /HPF (ref 0–5)
GLUCOSE URINE: NEGATIVE MG/DL
HYALINE CASTS: 2 /LPF (ref 0–8)
KETONES, URINE: NEGATIVE MG/DL
LEUKOCYTE ESTERASE, URINE: ABNORMAL
MICROSCOPIC EXAMINATION: YES
NITRITE, URINE: NEGATIVE
PH UA: 6.5 (ref 5–8)
PROTEIN UA: NEGATIVE MG/DL
RBC UA: 5 /HPF (ref 0–4)
SPECIFIC GRAVITY UA: 1.01 (ref 1–1.03)
URINE TYPE: ABNORMAL
UROBILINOGEN, URINE: 0.2 E.U./DL
WBC UA: 16 /HPF (ref 0–5)

## 2020-06-06 PROCEDURE — 87086 URINE CULTURE/COLONY COUNT: CPT

## 2020-06-06 PROCEDURE — 81001 URINALYSIS AUTO W/SCOPE: CPT

## 2020-06-07 LAB — URINE CULTURE, ROUTINE: NORMAL

## 2020-06-08 ENCOUNTER — APPOINTMENT (OUTPATIENT)
Dept: CT IMAGING | Age: 85
End: 2020-06-08
Payer: MEDICARE

## 2020-06-08 ENCOUNTER — APPOINTMENT (OUTPATIENT)
Dept: GENERAL RADIOLOGY | Age: 85
End: 2020-06-08
Payer: MEDICARE

## 2020-06-08 ENCOUNTER — HOSPITAL ENCOUNTER (OUTPATIENT)
Age: 85
Setting detail: OBSERVATION
Discharge: HOME | End: 2020-06-11
Attending: EMERGENCY MEDICINE | Admitting: INTERNAL MEDICINE
Payer: MEDICARE

## 2020-06-08 PROBLEM — G93.40 ACUTE ENCEPHALOPATHY: Status: ACTIVE | Noted: 2020-06-08

## 2020-06-08 LAB
A/G RATIO: 1.1 (ref 1.1–2.2)
ALBUMIN SERPL-MCNC: 3.3 G/DL (ref 3.4–5)
ALP BLD-CCNC: 91 U/L (ref 40–129)
ALT SERPL-CCNC: 15 U/L (ref 10–40)
ANION GAP SERPL CALCULATED.3IONS-SCNC: 12 MMOL/L (ref 3–16)
AST SERPL-CCNC: 21 U/L (ref 15–37)
BASOPHILS ABSOLUTE: 0.2 K/UL (ref 0–0.2)
BASOPHILS RELATIVE PERCENT: 2.2 %
BILIRUB SERPL-MCNC: 0.8 MG/DL (ref 0–1)
BILIRUBIN URINE: NEGATIVE
BLOOD, URINE: NEGATIVE
BUN BLDV-MCNC: 15 MG/DL (ref 7–20)
CALCIUM SERPL-MCNC: 9.1 MG/DL (ref 8.3–10.6)
CHLORIDE BLD-SCNC: 93 MMOL/L (ref 99–110)
CLARITY: ABNORMAL
CO2: 26 MMOL/L (ref 21–32)
COLOR: YELLOW
CREAT SERPL-MCNC: 0.9 MG/DL (ref 0.6–1.2)
EOSINOPHILS ABSOLUTE: 0.1 K/UL (ref 0–0.6)
EOSINOPHILS RELATIVE PERCENT: 0.8 %
EPITHELIAL CELLS, UA: 1 /HPF (ref 0–5)
GFR AFRICAN AMERICAN: >60
GFR NON-AFRICAN AMERICAN: 59
GLOBULIN: 3 G/DL
GLUCOSE BLD-MCNC: 133 MG/DL (ref 70–99)
GLUCOSE URINE: NEGATIVE MG/DL
HCT VFR BLD CALC: 34.1 % (ref 36–48)
HEMOGLOBIN: 11.4 G/DL (ref 12–16)
HYALINE CASTS: 4 /LPF (ref 0–8)
KETONES, URINE: NEGATIVE MG/DL
LEUKOCYTE ESTERASE, URINE: NEGATIVE
LIPASE: 13 U/L (ref 13–60)
LYMPHOCYTES ABSOLUTE: 0.9 K/UL (ref 1–5.1)
LYMPHOCYTES RELATIVE PERCENT: 11.2 %
MAGNESIUM: 1.8 MG/DL (ref 1.8–2.4)
MCH RBC QN AUTO: 28.1 PG (ref 26–34)
MCHC RBC AUTO-ENTMCNC: 33.3 G/DL (ref 31–36)
MCV RBC AUTO: 84.3 FL (ref 80–100)
MICROSCOPIC EXAMINATION: YES
MONOCYTES ABSOLUTE: 0.6 K/UL (ref 0–1.3)
MONOCYTES RELATIVE PERCENT: 7 %
NEUTROPHILS ABSOLUTE: 6.5 K/UL (ref 1.7–7.7)
NEUTROPHILS RELATIVE PERCENT: 78.8 %
NITRITE, URINE: NEGATIVE
PDW BLD-RTO: 15 % (ref 12.4–15.4)
PH UA: 5.5 (ref 5–8)
PLATELET # BLD: 346 K/UL (ref 135–450)
PMV BLD AUTO: 8.5 FL (ref 5–10.5)
POTASSIUM REFLEX MAGNESIUM: 3 MMOL/L (ref 3.5–5.1)
PROTEIN UA: ABNORMAL MG/DL
RBC # BLD: 4.05 M/UL (ref 4–5.2)
RBC UA: 1 /HPF (ref 0–4)
SODIUM BLD-SCNC: 131 MMOL/L (ref 136–145)
SPECIFIC GRAVITY UA: 1.01 (ref 1–1.03)
TOTAL PROTEIN: 6.3 G/DL (ref 6.4–8.2)
TROPONIN: <0.01 NG/ML
URINE REFLEX TO CULTURE: ABNORMAL
URINE TYPE: ABNORMAL
UROBILINOGEN, URINE: 0.2 E.U./DL
WBC # BLD: 8.2 K/UL (ref 4–11)
WBC UA: 2 /HPF (ref 0–5)

## 2020-06-08 PROCEDURE — 83690 ASSAY OF LIPASE: CPT

## 2020-06-08 PROCEDURE — 6360000002 HC RX W HCPCS: Performed by: NURSE PRACTITIONER

## 2020-06-08 PROCEDURE — 80053 COMPREHEN METABOLIC PANEL: CPT

## 2020-06-08 PROCEDURE — 71045 X-RAY EXAM CHEST 1 VIEW: CPT

## 2020-06-08 PROCEDURE — 93005 ELECTROCARDIOGRAM TRACING: CPT | Performed by: EMERGENCY MEDICINE

## 2020-06-08 PROCEDURE — 81001 URINALYSIS AUTO W/SCOPE: CPT

## 2020-06-08 PROCEDURE — 84484 ASSAY OF TROPONIN QUANT: CPT

## 2020-06-08 PROCEDURE — P9612 CATHETERIZE FOR URINE SPEC: HCPCS

## 2020-06-08 PROCEDURE — 83735 ASSAY OF MAGNESIUM: CPT

## 2020-06-08 PROCEDURE — 1200000000 HC SEMI PRIVATE

## 2020-06-08 PROCEDURE — 85025 COMPLETE CBC W/AUTO DIFF WBC: CPT

## 2020-06-08 PROCEDURE — G0378 HOSPITAL OBSERVATION PER HR: HCPCS

## 2020-06-08 PROCEDURE — 96365 THER/PROPH/DIAG IV INF INIT: CPT

## 2020-06-08 PROCEDURE — 99285 EMERGENCY DEPT VISIT HI MDM: CPT

## 2020-06-08 PROCEDURE — 2580000003 HC RX 258: Performed by: NURSE PRACTITIONER

## 2020-06-08 PROCEDURE — 70450 CT HEAD/BRAIN W/O DYE: CPT

## 2020-06-08 RX ORDER — MORPHINE SULFATE 4 MG/ML
4 INJECTION, SOLUTION INTRAMUSCULAR; INTRAVENOUS ONCE
Status: DISCONTINUED | OUTPATIENT
Start: 2020-06-08 | End: 2020-06-08

## 2020-06-08 RX ORDER — AZITHROMYCIN 250 MG/1
TABLET, FILM COATED ORAL
Status: ON HOLD | COMMUNITY
Start: 2020-05-28 | End: 2020-06-10 | Stop reason: SDUPTHER

## 2020-06-08 RX ORDER — POTASSIUM CHLORIDE 7.45 MG/ML
10 INJECTION INTRAVENOUS ONCE
Status: COMPLETED | OUTPATIENT
Start: 2020-06-08 | End: 2020-06-08

## 2020-06-08 RX ORDER — 0.9 % SODIUM CHLORIDE 0.9 %
1000 INTRAVENOUS SOLUTION INTRAVENOUS ONCE
Status: COMPLETED | OUTPATIENT
Start: 2020-06-08 | End: 2020-06-09

## 2020-06-08 RX ORDER — ONDANSETRON 2 MG/ML
4 INJECTION INTRAMUSCULAR; INTRAVENOUS EVERY 30 MIN PRN
Status: DISCONTINUED | OUTPATIENT
Start: 2020-06-08 | End: 2020-06-08

## 2020-06-08 RX ORDER — 0.9 % SODIUM CHLORIDE 0.9 %
1000 INTRAVENOUS SOLUTION INTRAVENOUS ONCE
Status: DISCONTINUED | OUTPATIENT
Start: 2020-06-08 | End: 2020-06-08

## 2020-06-08 RX ADMIN — POTASSIUM CHLORIDE 10 MEQ: 7.46 INJECTION, SOLUTION INTRAVENOUS at 20:32

## 2020-06-08 RX ADMIN — SODIUM CHLORIDE 1000 ML: 9 INJECTION, SOLUTION INTRAVENOUS at 20:32

## 2020-06-08 ASSESSMENT — ENCOUNTER SYMPTOMS
SHORTNESS OF BREATH: 0
VOMITING: 0
ABDOMINAL PAIN: 0
NAUSEA: 0
BLOOD IN STOOL: 0
RHINORRHEA: 0
DIARRHEA: 0
SORE THROAT: 0
CONSTIPATION: 0

## 2020-06-08 ASSESSMENT — PAIN SCALES - GENERAL: PAINLEVEL_OUTOF10: 3

## 2020-06-08 ASSESSMENT — PAIN DESCRIPTION - LOCATION: LOCATION: BACK

## 2020-06-08 ASSESSMENT — PAIN DESCRIPTION - PAIN TYPE: TYPE: CHRONIC PAIN

## 2020-06-08 NOTE — ED PROVIDER NOTES
[facility]        Pt Name: Selvin Westfall  MRN: [id number]  [place] 1934  Date of evaluation: 6/8/2020  Provider: GISELL Perry - CNP  PCP: Jaqueline Bronson MD    This patient was seen and evaluated by the attending physician Arturo eGe, [facility]       Chief Complaint   Patient presents with    Fatigue     Pt reports that she has been feeling weak for a couple days. Per family pt has been having issues with confusion and memory. Family also reporting pt has been having issues with bladder control. HISTORY OF PRESENT ILLNESS   (Location/Symptom, Timing/Onset,Context/Setting, Quality, Duration, Modifying Factors, Severity)  Note limiting factors. Selvin Westfall is a 80 y.o. female who presents emergency department with concern for generalized weakness and confusion. Patient had a stroke back in April. She has right-sided deficits as a result. Over the weekend she started with some confusion and urinary incontinence. Family reports that they had a urine sent off for possible UTI. This came back negative today. Her therapist came today to work with her and she was too weak to use the walker. This is unusual for her, therefore she was sent to the ER. She denies fever, rash, headaches, dizziness, chest pain, shortness of breath, cough, congestion, abdominal pain, nausea, vomiting, diarrhea, constipation, blood in the stool, or painful urination. One friend/family member at bedside. Nursing Notes triage note reviewed and agreed with or any disagreements were addressed  in the HPI. REVIEW OF SYSTEMS    (2-9 systems for level 4, 10 or more for level 5)     Review of Systems   Constitutional: Negative for chills and fever. HENT: Negative for postnasal drip, rhinorrhea and sore throat. Eyes: Negative for visual disturbance.    Respiratory: Negative for shortness of breath. Cardiovascular: Negative for chest pain. Gastrointestinal: Negative for abdominal pain, blood in stool, constipation, diarrhea, nausea and vomiting. Genitourinary: Positive for dyspareunia. Negative for dysuria, flank pain and hematuria. Skin: Negative for rash. Neurological: Positive for weakness. Negative for headaches. Psychiatric/Behavioral: Positive for confusion. All other systems reviewed and are negative. Positives and Pertinent negatives as per HPI. Except as noted above in the ROS, all other systems were reviewed and negative.        PAST MEDICAL HISTORY     Past Medical History:   Diagnosis Date    Arthritis     CAD (coronary artery disease)     GERD (gastroesophageal reflux disease)     Hx of blood clots     phlebitis    Hyperlipidemia     Hypertension          SURGICAL HISTORY       Past Surgical History:   Procedure Laterality Date    APPENDECTOMY      BREAST SURGERY      cyst removal    CHOLECYSTECTOMY      ENDOSCOPY, COLON, DIAGNOSTIC      egd w/ dilitation    EYE SURGERY      cataract, bilat    JOINT REPLACEMENT      right shoulder    VARICOSE VEIN SURGERY           CURRENT MEDICATIONS       Previous Medications    AMLODIPINE (NORVASC) 10 MG TABLET    Take 1 tablet by mouth daily    ASPIRIN 81 MG EC TABLET    Take 1 tablet by mouth daily    ATORVASTATIN (LIPITOR) 40 MG TABLET    Take 1 tablet by mouth nightly    AZITHROMYCIN (ZITHROMAX) 250 MG TABLET    TK 2 TS PO ALISHA THEN TK 1 T PO D THEREAFTER    BIOTIN 1000 MCG TABS    Take 1,000 mcg by mouth Daily with supper    CALCIUM CARBONATE (OSCAL) 500 MG TABS TABLET    Take 500 mg by mouth daily    CLOPIDOGREL (PLAVIX) 75 MG TABLET    Take 1 tablet by mouth daily    ESCITALOPRAM (LEXAPRO) 5 MG TABLET    Take 1 tablet by mouth daily    FAMOTIDINE (PEPCID) 20 MG TABLET    Take 1 tablet by mouth 2 times daily    LOSARTAN (COZAAR) 100 MG TABLET    Take 100 mg by mouth daily    METHYLPHENIDATE (RITALIN) 10 MG TABLET LOW K - Abnormal; Notable for the following components:    Sodium 131 (*)     Potassium reflex Magnesium 3.0 (*)     Chloride 93 (*)     Glucose 133 (*)     GFR Non- 59 (*)     Total Protein 6.3 (*)     Alb 3.3 (*)     All other components within normal limits    Narrative:     Performed at:  [facility] [address], [place]   Phone [phone]   URINE RT REFLEX TO CULTURE - Abnormal; Notable for the following components:    Clarity, UA CLOUDY (*)     Protein, UA TRACE (*)     All other components within normal limits    Narrative:     Performed at:  [facility] [address], [place]   Phone [phone]   LIPASE    Narrative:     Performed at:  [facility] [address], [place]   Phone [phone]   TROPONIN    Narrative:     Performed at:  [facility] [address], [place]   Phone [phone]   MAGNESIUM    Narrative:     Performed at:  [facility] [address], [place]   Phone [phone]   MICROSCOPIC URINALYSIS    Narrative:     Performed at:  [facility] [address], [place]   Phone [phone]       All other labs werewithin normal range or not returned as of this dictation. EKG: All EKG's are interpreted by the Emergency Department Physician who either signs or Co-signs this chart in the absence of acardiologist.  Please see their note for interpretation of EKG. RADIOLOGY:   Interpretation per the Radiologist below, if available at the time of this note:    CT HEAD WO CONTRAST   Final Result   No acute intracranial abnormality. Diffuse parenchymal volume loss and sequela of moderate to severe chronic   microvascular ischemic changes.          XR CHEST PORTABLE   Preliminary

## 2020-06-09 PROBLEM — I69.30 LATE EFFECTS OF CEREBRAL ISCHEMIC STROKE: Status: ACTIVE | Noted: 2020-06-09

## 2020-06-09 PROBLEM — I63.9 ACUTE ISCHEMIC STROKE (HCC): Status: RESOLVED | Noted: 2020-04-20 | Resolved: 2020-06-09

## 2020-06-09 LAB
EKG ATRIAL RATE: 89 BPM
EKG DIAGNOSIS: NORMAL
EKG P AXIS: 52 DEGREES
EKG P-R INTERVAL: 148 MS
EKG Q-T INTERVAL: 356 MS
EKG QRS DURATION: 92 MS
EKG QTC CALCULATION (BAZETT): 433 MS
EKG R AXIS: -36 DEGREES
EKG T AXIS: 5 DEGREES
EKG VENTRICULAR RATE: 89 BPM

## 2020-06-09 PROCEDURE — 1200000000 HC SEMI PRIVATE

## 2020-06-09 PROCEDURE — 97530 THERAPEUTIC ACTIVITIES: CPT

## 2020-06-09 PROCEDURE — 92526 ORAL FUNCTION THERAPY: CPT

## 2020-06-09 PROCEDURE — 6370000000 HC RX 637 (ALT 250 FOR IP): Performed by: INTERNAL MEDICINE

## 2020-06-09 PROCEDURE — 92610 EVALUATE SWALLOWING FUNCTION: CPT

## 2020-06-09 PROCEDURE — 93010 ELECTROCARDIOGRAM REPORT: CPT | Performed by: INTERNAL MEDICINE

## 2020-06-09 PROCEDURE — 97161 PT EVAL LOW COMPLEX 20 MIN: CPT

## 2020-06-09 PROCEDURE — G0378 HOSPITAL OBSERVATION PER HR: HCPCS

## 2020-06-09 PROCEDURE — 97116 GAIT TRAINING THERAPY: CPT

## 2020-06-09 PROCEDURE — 2580000003 HC RX 258: Performed by: INTERNAL MEDICINE

## 2020-06-09 PROCEDURE — 97165 OT EVAL LOW COMPLEX 30 MIN: CPT

## 2020-06-09 RX ORDER — CLOPIDOGREL BISULFATE 75 MG/1
75 TABLET ORAL DAILY
Status: DISCONTINUED | OUTPATIENT
Start: 2020-06-09 | End: 2020-06-11 | Stop reason: HOSPADM

## 2020-06-09 RX ORDER — AMLODIPINE BESYLATE 5 MG/1
10 TABLET ORAL DAILY
Status: DISCONTINUED | OUTPATIENT
Start: 2020-06-09 | End: 2020-06-11 | Stop reason: HOSPADM

## 2020-06-09 RX ORDER — SODIUM CHLORIDE 9 MG/ML
INJECTION, SOLUTION INTRAVENOUS CONTINUOUS
Status: DISCONTINUED | OUTPATIENT
Start: 2020-06-09 | End: 2020-06-10

## 2020-06-09 RX ORDER — METHYLPHENIDATE HYDROCHLORIDE 10 MG/1
10 TABLET ORAL
Status: DISCONTINUED | OUTPATIENT
Start: 2020-06-09 | End: 2020-06-11 | Stop reason: HOSPADM

## 2020-06-09 RX ORDER — LOSARTAN POTASSIUM 100 MG/1
100 TABLET ORAL DAILY
Status: DISCONTINUED | OUTPATIENT
Start: 2020-06-09 | End: 2020-06-11 | Stop reason: HOSPADM

## 2020-06-09 RX ORDER — CALCIUM CARBONATE 500(1250)
500 TABLET ORAL DAILY
Status: DISCONTINUED | OUTPATIENT
Start: 2020-06-09 | End: 2020-06-11 | Stop reason: HOSPADM

## 2020-06-09 RX ORDER — ESCITALOPRAM OXALATE 10 MG/1
5 TABLET ORAL DAILY
Status: DISCONTINUED | OUTPATIENT
Start: 2020-06-09 | End: 2020-06-11 | Stop reason: HOSPADM

## 2020-06-09 RX ORDER — AZITHROMYCIN 250 MG/1
250 TABLET, FILM COATED ORAL DAILY
Status: DISCONTINUED | OUTPATIENT
Start: 2020-06-09 | End: 2020-06-10

## 2020-06-09 RX ORDER — M-VIT,TX,IRON,MINS/CALC/FOLIC 27MG-0.4MG
1 TABLET ORAL DAILY
Status: DISCONTINUED | OUTPATIENT
Start: 2020-06-09 | End: 2020-06-11 | Stop reason: HOSPADM

## 2020-06-09 RX ORDER — ASPIRIN 81 MG/1
81 TABLET ORAL DAILY
Status: DISCONTINUED | OUTPATIENT
Start: 2020-06-09 | End: 2020-06-11 | Stop reason: HOSPADM

## 2020-06-09 RX ORDER — ATORVASTATIN CALCIUM 40 MG/1
40 TABLET, FILM COATED ORAL NIGHTLY
Status: DISCONTINUED | OUTPATIENT
Start: 2020-06-09 | End: 2020-06-11 | Stop reason: HOSPADM

## 2020-06-09 RX ORDER — FAMOTIDINE 20 MG/1
20 TABLET, FILM COATED ORAL 2 TIMES DAILY
Status: DISCONTINUED | OUTPATIENT
Start: 2020-06-09 | End: 2020-06-11 | Stop reason: HOSPADM

## 2020-06-09 RX ORDER — BIOTIN 1 MG
1000 TABLET ORAL
Status: DISCONTINUED | OUTPATIENT
Start: 2020-06-09 | End: 2020-06-09 | Stop reason: RX

## 2020-06-09 RX ADMIN — AMLODIPINE BESYLATE 10 MG: 5 TABLET ORAL at 10:24

## 2020-06-09 RX ADMIN — AZITHROMYCIN 250 MG: 250 TABLET, FILM COATED ORAL at 10:22

## 2020-06-09 RX ADMIN — MULTIPLE VITAMINS W/ MINERALS TAB 1 TABLET: TAB at 10:24

## 2020-06-09 RX ADMIN — SODIUM CHLORIDE: 9 INJECTION, SOLUTION INTRAVENOUS at 00:46

## 2020-06-09 RX ADMIN — FAMOTIDINE 20 MG: 20 TABLET, FILM COATED ORAL at 22:15

## 2020-06-09 RX ADMIN — ASPIRIN 81 MG: 81 TABLET, COATED ORAL at 10:22

## 2020-06-09 RX ADMIN — CLOPIDOGREL 75 MG: 75 TABLET, FILM COATED ORAL at 10:23

## 2020-06-09 RX ADMIN — CALCIUM 500 MG: 500 TABLET ORAL at 10:24

## 2020-06-09 RX ADMIN — METHYLPHENIDATE HYDROCHLORIDE 10 MG: 10 TABLET ORAL at 06:05

## 2020-06-09 RX ADMIN — DESMOPRESSIN ACETATE 40 MG: 0.2 TABLET ORAL at 22:15

## 2020-06-09 RX ADMIN — ESCITALOPRAM OXALATE 5 MG: 10 TABLET ORAL at 10:23

## 2020-06-09 RX ADMIN — LOSARTAN POTASSIUM 100 MG: 100 TABLET, FILM COATED ORAL at 10:23

## 2020-06-09 RX ADMIN — FAMOTIDINE 20 MG: 20 TABLET, FILM COATED ORAL at 10:23

## 2020-06-09 RX ADMIN — METHYLPHENIDATE HYDROCHLORIDE 10 MG: 10 TABLET ORAL at 16:32

## 2020-06-09 ASSESSMENT — PAIN SCALES - GENERAL
PAINLEVEL_OUTOF10: 0

## 2020-06-09 NOTE — PROGRESS NOTES
[facility] [phone] was active with this pt prior to admit. Discharge planner notified. Will follow.

## 2020-06-09 NOTE — DISCHARGE INSTR - COC
Continuity of Care Form    Patient Name: Jason Peña   :  1934  MRN:  [id number]    Admit date:  2020  Discharge date:  2020    Code Status Order: Full Code   Advance Directives:   [facility] Documentation     Date/Time Healthcare Directive Type of Healthcare Directive Copy in [facility] [address] Agent's Name Healthcare Agent's Phone Number    20 [phone]  Yes, patient has an advance directive for healthcare treatment -- -- -- -- --          Admitting Physician:  Leon Smith MD  PCP: Waqar Cisneros MD    Discharging Nurse: [facility] Unit/Room#: 3OV-3492/7096-47  Discharging Unit Phone Number: [phone]    Emergency Contact:   Extended Emergency Contact Information  Primary Emergency Contact: Fernie Ceballos  Address: [address] [place] [address]  Home Phone: [phone]  Relation: Spouse  Secondary Emergency Contact: blaine ceballos  Mobile Phone: [phone]  Relation: Child  Preferred language: English   needed?  No    Past Surgical History:  Past Surgical History:   Procedure Laterality Date    APPENDECTOMY      BREAST SURGERY      cyst removal    CHOLECYSTECTOMY      ENDOSCOPY, COLON, DIAGNOSTIC      egd w/ dilitation    EYE SURGERY      cataract, bilat    JOINT REPLACEMENT      right shoulder    VARICOSE VEIN SURGERY         Immunization History:   Immunization History   Administered Date(s) Administered    Influenza, High Dose (Fluzone 65 yrs and older) 2018, 10/11/2019    Pneumococcal Conjugate 13-valent (Sakcbsm11) 2017    Pneumococcal Polysaccharide (Scmjefmsg86) 2019    Tdap (Boostrix, Adacel) 2018       Active Problems:  Patient Active Problem List   Diagnosis Code    Acute cerebral infarction ([facility] [address] I63.9    Monoparesis of leg (HCC) G83.10    Expressive aphasia R47.01    Essential hypertension I10    Cerebrovascular disease I67.9    ASHD (arteriosclerotic heart disease) I25.10    Ataxia R27.0    Acute ischemic stroke ([facility] [address] I63.9    Acute encephalopathy G93.40       Isolation/Infection:   Isolation          Droplet Plus        Patient Infection Status     Infection Onset Added Last Indicated Last Indicated By Review Planned Expiration Resolved Resolved By    COVID-19 Rule Out 05/17/20 05/17/20 05/17/20 COVID-19 (Ordered)              Nurse Assessment:  Last Vital Signs: BP (!) 154/70   Pulse 91   Temp 97.5 °F (36.4 °C) (Oral)   Resp 16   Ht 5' 3\" (1.6 m)   Wt 147 lb 6 oz (66.8 kg)   SpO2 94%   BMI 26.11 kg/m²     Last documented pain score (0-10 scale): Pain Level: 0  Last Weight:   Wt Readings from Last 1 Encounters:   06/09/20 147 lb 6 oz (66.8 kg)     Mental Status:  oriented, alert and some confusion at times    IV Access:  - None    Nursing Mobility/ADLs:  Walking   Assisted  Transfer  Assisted  Bathing  Assisted  Dressing  Assisted  Toileting  Assisted  Feeding  Independent  Med Admin  Assisted  Med Delivery   whole    Wound Care Documentation and Therapy:        Elimination:  Continence:   · Bowel: Yes  · Bladder: Yes  Urinary Catheter: None   Colostomy/Ileostomy/Ileal Conduit: No       Date of Last BM: 6/11/2020    Intake/Output Summary (Last 24 hours) at 6/9/2020 1514  Last data filed at 6/9/2020 0644  Gross per 24 hour   Intake --   Output 1500 ml   Net -1500 ml     I/O last 3 completed shifts:  In: -   Out: 1500 [Urine:1500]    Safety Concerns: At Risk for Falls    Impairments/Disabilities:      Vision and Hearing    Nutrition Therapy:  Current Nutrition Therapy:   - Oral Diet:  General    Routes of Feeding: Oral  Liquids: Thin Liquids  Daily Fluid Restriction: no  Last Modified Barium Swallow with Video (Video Swallowing Test): not done    Treatments at the Time of Hospital Discharge:   Respiratory Treatments: na  Oxygen Therapy:  is not on home oxygen therapy.   Ventilator:    - No ventilator support    Rehab Therapies: SN,PT,OT,HHA  Weight Bearing Status/Restrictions: No weight bearing restirctions  Other Medical Equipment (for information only, NOT a DME order):  walker  Other Treatments: na  HOME HEALTH CARE: LEVEL [address]  to establish plan of care for patient over 60 day period   [address] Initial home SN evaluation visit to occur within 24-48 hours for:   medication management   VS and clinical assessment   S&S chronic disease exacerbation education + when to contact MD / NP   care coordination   Medication Reconciliation during 1st SN visit     PT/OT/Speech    Evaluations in home within 24-48 hours of discharge to include DME and home safety   Yogesh Loera Frontload therapy 5 days, then 3x a week    OT to evaluate if patient has [address] needs for personal care       evaluation within 24-48 hours to evaluate resources & insurance for potential AL, IL, LTC, and Medicaid options       Palliative Care referral within 5 days of hospital discharge  PCP Visit scheduled within 3 - 7 days of hospital discharge      Telehealth-Homecare Vitals(If patient is agreeable and meets guidelines)    Patient's personal belongings (please select all that are sent with patient):  Glasses, Hearing Aides left    RN SIGNATURE:  {Esignature:[id number]}    CASE MANAGEMENT/SOCIAL WORK SECTION    Inpatient Status Date: 6/8/2020    Readmission Risk Assessment Score:  Readmission Risk              Risk of Unplanned Readmission:        16           Discharging to Facility/ Agency   Name:  [facility] care    Address: [address] [place] [address], [address] [place] [address]  Phone: [phone]  Fax: [phone]    · :    / signature: Bailee Heck RN, BSN  [phone]       PHYSICIAN SECTION    Prognosis: Guarded    Condition at Discharge: Stable    Rehab Potential (if transferring to Rehab): Guarded    Recommended Labs or Other Treatments After Discharge: home health care  , check BMP level in 3 days  And notify her PMD    Physician Certification: I certify the above information and transfer of Óscar Patel  is necessary for the continuing treatment of the diagnosis listed and that she requires [facility] care for less 30 days.      Update Admission H&P: No change in H&P    PHYSICIAN SIGNATURE:  Electronically signed by Sheila Foster MD on 6/10/20 at 1:15 PM EDT

## 2020-06-09 NOTE — PROGRESS NOTES
Pt awake in chair. VSS, assessment complete and medications given. Pt denies any needs. Call light in reach and chair alarm engaged.

## 2020-06-09 NOTE — PROGRESS NOTES
Occupational Therapy   Occupational Therapy Initial Assessment  Date: 2020   Patient Name: Jameel Truong  MRN: [id number]     : 1934    Date of Service: 2020    Discharge Recommendations: Hanna Ceballos scored a 21/24 on the AM-PAC ADL Inpatient form. Current research shows that an AM-PAC score of 18 or greater is typically associated with a discharge to the patient's home setting. Based on the patient's AM-PAC score, and their current ADL deficits, it is recommended that the patient have 2-3 sessions per week of Occupational Therapy at d/c to increase the patient's independence. At this time, this patient demonstrates the endurance and safety to discharge home with HOME (home vs OP services) and a follow up treatment frequency of 2-3x/wk. Please see assessment section for further patient specific details. If patient discharges prior to next session this note will serve as a discharge summary. Please see below for the latest assessment towards goals. HOME HEALTH CARE: LEVEL 3 SAFETY     - Initial home health evaluation to occur within 24-48 hours, in patient home   - Therapy evaluations in home within 24-48 hours of discharge; including DME and home safety   - Frontload therapy 5 days, then 3x a week   - Therapy to evaluate if patient has [address] needs for personal care   -  evaluation within 24-48 hours, includes evaluation of resources and insurance to determine AL, IL, LTC, and Medicaid options        OT Equipment Recommendations  Equipment Needed: No    Assessment   Performance deficits / Impairments: Decreased functional mobility ; Decreased ADL status; Decreased balance  Treatment Diagnosis: Decreased ADLs, IADLs, transfers, mobility associated with Acute encephalopathy  Prognosis: Good  Decision Making: Low Complexity  OT Education: OT Role;Plan of Care;ADL Adaptive Strategies;Transfer Training;IADL Safety  Patient Education: Eval, discharge

## 2020-06-09 NOTE — PROGRESS NOTES
Physical Therapy    Facility/Department: [address] ONCOLOGY  Initial Assessment    NAME: Aminata Vasquez  : 1934  MRN: [id number]    Date of Service: 2020    Discharge Recommendations: Hanna Ceballos scored a 18/24 on the AM-PAC short mobility form. Current research shows that an AM-PAC score of 18 or greater is typically associated with a discharge to the patient's home setting. Based on the patient's AM-PAC score and their current functional mobility deficits, it is recommended that the patient have 2-3 sessions per week of Physical Therapy at d/c to increase the patient's independence. At this time, this patient demonstrates the endurance and safety to discharge home with CPT services and a follow up treatment frequency of 2-3x/wk. Please see assessment section for further patient specific details. If patient discharges prior to next session this note will serve as a discharge summary. Please see below for the latest assessment towards goals. HOME HEALTH CARE: LEVEL 3 SAFETY     - Initial home health evaluation to occur within 24-48 hours, in patient home   - Therapy evaluations in home within 24-48 hours of discharge; including DME and home safety   - Frontload therapy 5 days, then 3x a week   - Therapy to evaluate if patient has [address] needs for personal care   -  evaluation within 24-48 hours, includes evaluation of resources and insurance to determine AL, IL, LTC, and Medicaid options     PT Equipment Recommendations  Equipment Needed: No  Other: pt has RW at home    Assessment   Body structures, Functions, Activity limitations: Decreased functional mobility ; Decreased cognition;Decreased posture;Decreased endurance;Decreased balance;Decreased safe awareness  Assessment: pt presents close to baseline with functional mobility but demonstrated poor insight into current deficits and poor safety awareness with mobility.   pt will benefit from continued skilled

## 2020-06-09 NOTE — PROGRESS NOTES
4 Eyes Skin Assessment     The patient is being assess for  Admission    I agree that 2 RN's have performed a thorough Head to Toe Skin Assessment on the patient. ALL assessment sites listed below have been assessed. Areas assessed by both nurses: Alice Doss RN  [x]   Head, Face, and Ears   [x]   Shoulders, Back, and Chest  [x]   Arms, Elbows, and Hands   [x]   Coccyx, Sacrum, and IschIum  [x]   Legs, Feet, and Heels        Does the Patient have Skin Breakdown?   No         Blayne Prevention initiated:  NA   Wound Care Orders initiated:  NA      WOC nurse consulted for Pressure Injury (Stage 3,4, Unstageable, DTI, NWPT, and Complex wounds), New and Established Ostomies:  NA      Nurse 1 eSignature: Electronically signed by Heraclio Garcia RN on 6/9/20 at 12:51 AM EDT    **SHARE this note so that the co-signing nurse is able to place an eSignature**    Nurse 2 eSignature: Electronically signed by Isiah Weinstein RN on 6/9/20 at 12:53 AM EDT

## 2020-06-09 NOTE — ED NOTES
Dr Sandhya Jenkins at bedside to update patient and family on plan of care and results.       [facility]  06/08/20 2133

## 2020-06-09 NOTE — CARE COORDINATION
Discharge Planning Assessment  Discharge Planning Assessment  RN/SW discharge planner met with patient/ (and family member) to discuss reason for admission, current living situation, and potential needs at the time of discharge completed with daughter Kenton Augustin     Demographics/Insurance verified Yes- correct pt has [facility] Medicare     Current type of dwellin story and stays on first level does not use second level     Patient from ECF/SW confirmed with:n/a     Living arrangements: lives with spouse but since pt's stroke daughter or son stays, has someone in home 24 hrs     Level of function/Support: daughter states pt can dress with minimal or no help, needs bathing assistance cannot get in out of tub by self, no walk in shower    PCP:Bettie Moseley    Last Visit to [address] chat May 28th     DME: 2 wheel walker, shower chair, grab bars, raised toilet seat, no oxygen use, bed and chair raiser that family ordered. Active with any community resources/agencies/skilled home care:Currently active with [facility] for RN, therapy, aide. No one has contacted about speech. Medication compliance issues: mediset that daughter or son organizes and pt needs some reminding to take     Financial issues that could impact healthcare:none       Tentative discharge plan: home with home care vs snf    Discussed and provided facilities of choice if transition to a skilled nursing facility is required at the time of discharge daughter states previously pt was on our ARU. Discussed if pt did not qualify for ARU and needed snf placement and daughter states they would prefer her home with home care. Discussed with patient and/or family that on the day of discharge home tentative time of discharge will be between 10 AM and noon.     [facility]  [phone]

## 2020-06-10 PROBLEM — R41.82 ALTERED MENTAL STATUS: Status: ACTIVE | Noted: 2020-06-10

## 2020-06-10 LAB
ANION GAP SERPL CALCULATED.3IONS-SCNC: 11 MMOL/L (ref 3–16)
BUN BLDV-MCNC: 7 MG/DL (ref 7–20)
CALCIUM SERPL-MCNC: 9.4 MG/DL (ref 8.3–10.6)
CHLORIDE BLD-SCNC: 98 MMOL/L (ref 99–110)
CO2: 26 MMOL/L (ref 21–32)
CREAT SERPL-MCNC: 0.6 MG/DL (ref 0.6–1.2)
GFR AFRICAN AMERICAN: >60
GFR NON-AFRICAN AMERICAN: >60
GLUCOSE BLD-MCNC: 133 MG/DL (ref 70–99)
POTASSIUM SERPL-SCNC: 2.9 MMOL/L (ref 3.5–5.1)
SODIUM BLD-SCNC: 135 MMOL/L (ref 136–145)

## 2020-06-10 PROCEDURE — 97116 GAIT TRAINING THERAPY: CPT

## 2020-06-10 PROCEDURE — 97530 THERAPEUTIC ACTIVITIES: CPT

## 2020-06-10 PROCEDURE — 6370000000 HC RX 637 (ALT 250 FOR IP): Performed by: INTERNAL MEDICINE

## 2020-06-10 PROCEDURE — 2580000003 HC RX 258: Performed by: INTERNAL MEDICINE

## 2020-06-10 PROCEDURE — 96376 TX/PRO/DX INJ SAME DRUG ADON: CPT

## 2020-06-10 PROCEDURE — 80048 BASIC METABOLIC PNL TOTAL CA: CPT

## 2020-06-10 PROCEDURE — 6360000002 HC RX W HCPCS: Performed by: INTERNAL MEDICINE

## 2020-06-10 PROCEDURE — 36415 COLL VENOUS BLD VENIPUNCTURE: CPT

## 2020-06-10 PROCEDURE — G0378 HOSPITAL OBSERVATION PER HR: HCPCS

## 2020-06-10 RX ORDER — AZITHROMYCIN 250 MG/1
250 TABLET, FILM COATED ORAL DAILY
Qty: 4 TABLET | Refills: 0 | Status: SHIPPED | OUTPATIENT
Start: 2020-06-10 | End: 2020-06-10 | Stop reason: HOSPADM

## 2020-06-10 RX ORDER — POTASSIUM CHLORIDE 29.8 MG/ML
20 INJECTION INTRAVENOUS
Status: COMPLETED | OUTPATIENT
Start: 2020-06-10 | End: 2020-06-10

## 2020-06-10 RX ORDER — NITROFURANTOIN 25; 75 MG/1; MG/1
100 CAPSULE ORAL EVERY 12 HOURS SCHEDULED
Status: DISCONTINUED | OUTPATIENT
Start: 2020-06-10 | End: 2020-06-11 | Stop reason: HOSPADM

## 2020-06-10 RX ORDER — SENNA AND DOCUSATE SODIUM 50; 8.6 MG/1; MG/1
2 TABLET, FILM COATED ORAL DAILY
Status: DISCONTINUED | OUTPATIENT
Start: 2020-06-10 | End: 2020-06-11 | Stop reason: HOSPADM

## 2020-06-10 RX ORDER — NITROFURANTOIN 25; 75 MG/1; MG/1
100 CAPSULE ORAL EVERY 12 HOURS SCHEDULED
Qty: 8 CAPSULE | Refills: 0 | Status: SHIPPED | OUTPATIENT
Start: 2020-06-10 | End: 2020-06-14

## 2020-06-10 RX ORDER — METHYLPHENIDATE HYDROCHLORIDE 10 MG/1
10 TABLET ORAL
Qty: 6 TABLET | Refills: 0 | Status: SHIPPED | OUTPATIENT
Start: 2020-06-10 | End: 2020-06-13

## 2020-06-10 RX ORDER — POLYETHYLENE GLYCOL 3350 17 G/17G
17 POWDER, FOR SOLUTION ORAL DAILY
Status: DISCONTINUED | OUTPATIENT
Start: 2020-06-10 | End: 2020-06-11 | Stop reason: HOSPADM

## 2020-06-10 RX ORDER — POTASSIUM CHLORIDE 7.45 MG/ML
10 INJECTION INTRAVENOUS PRN
Status: DISCONTINUED | OUTPATIENT
Start: 2020-06-10 | End: 2020-06-10

## 2020-06-10 RX ORDER — POTASSIUM CHLORIDE 20 MEQ/1
20 TABLET, EXTENDED RELEASE ORAL 3 TIMES DAILY
Qty: 60 TABLET | Refills: 0 | Status: SHIPPED | OUTPATIENT
Start: 2020-06-10

## 2020-06-10 RX ORDER — POTASSIUM CHLORIDE 20 MEQ/1
40 TABLET, EXTENDED RELEASE ORAL PRN
Status: DISCONTINUED | OUTPATIENT
Start: 2020-06-10 | End: 2020-06-10

## 2020-06-10 RX ADMIN — AMLODIPINE BESYLATE 10 MG: 5 TABLET ORAL at 09:42

## 2020-06-10 RX ADMIN — AZITHROMYCIN 250 MG: 250 TABLET, FILM COATED ORAL at 09:42

## 2020-06-10 RX ADMIN — POTASSIUM CHLORIDE 20 MEQ: 29.8 INJECTION, SOLUTION INTRAVENOUS at 15:26

## 2020-06-10 RX ADMIN — ESCITALOPRAM OXALATE 5 MG: 10 TABLET ORAL at 09:42

## 2020-06-10 RX ADMIN — POTASSIUM CHLORIDE 20 MEQ: 29.8 INJECTION, SOLUTION INTRAVENOUS at 16:36

## 2020-06-10 RX ADMIN — CALCIUM 500 MG: 500 TABLET ORAL at 09:42

## 2020-06-10 RX ADMIN — FAMOTIDINE 20 MG: 20 TABLET, FILM COATED ORAL at 19:56

## 2020-06-10 RX ADMIN — DESMOPRESSIN ACETATE 40 MG: 0.2 TABLET ORAL at 19:56

## 2020-06-10 RX ADMIN — ASPIRIN 81 MG: 81 TABLET, COATED ORAL at 09:43

## 2020-06-10 RX ADMIN — POLYETHYLENE GLYCOL 3350 17 G: 17 POWDER, FOR SOLUTION ORAL at 19:56

## 2020-06-10 RX ADMIN — NITROFURANTOIN MONOHYDRATE/MACROCRYSTALLINE 100 MG: 25; 75 CAPSULE ORAL at 19:56

## 2020-06-10 RX ADMIN — MULTIPLE VITAMINS W/ MINERALS TAB 1 TABLET: TAB at 09:42

## 2020-06-10 RX ADMIN — CLOPIDOGREL 75 MG: 75 TABLET, FILM COATED ORAL at 09:42

## 2020-06-10 RX ADMIN — SENNOSIDES AND DOCUSATE SODIUM 2 TABLET: 8.6; 5 TABLET ORAL at 19:56

## 2020-06-10 RX ADMIN — METHYLPHENIDATE HYDROCHLORIDE 10 MG: 10 TABLET ORAL at 06:47

## 2020-06-10 RX ADMIN — SODIUM CHLORIDE: 9 INJECTION, SOLUTION INTRAVENOUS at 02:25

## 2020-06-10 RX ADMIN — METHYLPHENIDATE HYDROCHLORIDE 10 MG: 10 TABLET ORAL at 15:26

## 2020-06-10 RX ADMIN — FAMOTIDINE 20 MG: 20 TABLET, FILM COATED ORAL at 09:42

## 2020-06-10 RX ADMIN — LOSARTAN POTASSIUM 100 MG: 100 TABLET, FILM COATED ORAL at 09:42

## 2020-06-10 ASSESSMENT — PAIN SCALES - GENERAL: PAINLEVEL_OUTOF10: 0

## 2020-06-10 NOTE — PROGRESS NOTES
Physical Therapy  Facility/Department: [address] ONCOLOGY  Daily Treatment Note  NAME: Óscar Patel  : 1934  MRN: [id number]    Date of Service: 6/10/2020    Discharge Recommendations: Hanna Ceballos scored a 19/24 on the AM-PAC short mobility form. Current research shows that an AM-PAC score of 18 or greater is typically associated with a discharge to the patient's home setting. Based on the patient's AM-PAC score and their current functional mobility deficits, it is recommended that the patient have 2-3 sessions per week of Physical Therapy at d/c to increase the patient's independence. At this time, this patient demonstrates the endurance and safety to discharge home with home services and a follow up treatment frequency of 2-3x/wk. Please see assessment section for further patient specific details. If patient discharges prior to next session this note will serve as a discharge summary. Please see below for the latest assessment towards goals. HOME HEALTH CARE: LEVEL 3 SAFETY     - Initial home health evaluation to occur within 24-48 hours, in patient home   - Therapy evaluations in home within 24-48 hours of discharge; including DME and home safety   - Frontload therapy 5 days, then 3x a week   - Therapy to evaluate if patient has [address] needs for personal care   -  evaluation within 24-48 hours, includes evaluation of resources and insurance to determine AL, IL, LTC, and Medicaid options       PT Equipment Recommendations  Equipment Needed: No  Other: pt has RW at home    Assessment   Body structures, Functions, Activity limitations: Decreased functional mobility ; Decreased cognition;Decreased posture;Decreased endurance;Decreased balance;Decreased safe awareness  Assessment: pt presents close to baseline with functional mobility but demonstrated poor insight into current deficits and poor safety awareness with mobility.   pt will benefit from continued skilled

## 2020-06-10 NOTE — H&P
pleasure to take care of your patients at [address] [place] [address].         Elaine Palomo MD    D: 06/09/2020 21:52:43       T: 06/09/2020 21:57:42     [id number]  Job#: [id number]     Doc#: [id number]    CC:  Matilde Haskins MD

## 2020-06-10 NOTE — PROGRESS NOTES
Nurse spoke with daughter Samantha Pacheco and reviewed discharge orders. All questions answered daughter to be phoned for transport to home.  See nurse handoff for number

## 2020-06-10 NOTE — CARE COORDINATION
Patient discharged 6/10/2020 to home with home care services. All discharge needs met per case management.     Dany Vasquez RN, BSN  [phone]

## 2020-06-11 VITALS
HEIGHT: 63 IN | RESPIRATION RATE: 16 BRPM | TEMPERATURE: 98 F | BODY MASS INDEX: 25.87 KG/M2 | SYSTOLIC BLOOD PRESSURE: 132 MMHG | HEART RATE: 81 BPM | WEIGHT: 146 LBS | DIASTOLIC BLOOD PRESSURE: 69 MMHG | OXYGEN SATURATION: 96 %

## 2020-06-11 LAB
ANION GAP SERPL CALCULATED.3IONS-SCNC: 9 MMOL/L (ref 3–16)
BUN BLDV-MCNC: 6 MG/DL (ref 7–20)
CALCIUM SERPL-MCNC: 9.2 MG/DL (ref 8.3–10.6)
CHLORIDE BLD-SCNC: 101 MMOL/L (ref 99–110)
CO2: 28 MMOL/L (ref 21–32)
CREAT SERPL-MCNC: 0.5 MG/DL (ref 0.6–1.2)
GFR AFRICAN AMERICAN: >60
GFR NON-AFRICAN AMERICAN: >60
GLUCOSE BLD-MCNC: 104 MG/DL (ref 70–99)
POTASSIUM SERPL-SCNC: 3.2 MMOL/L (ref 3.5–5.1)
SODIUM BLD-SCNC: 138 MMOL/L (ref 136–145)

## 2020-06-11 PROCEDURE — 36415 COLL VENOUS BLD VENIPUNCTURE: CPT

## 2020-06-11 PROCEDURE — 6370000000 HC RX 637 (ALT 250 FOR IP): Performed by: INTERNAL MEDICINE

## 2020-06-11 PROCEDURE — 80048 BASIC METABOLIC PNL TOTAL CA: CPT

## 2020-06-11 PROCEDURE — G0378 HOSPITAL OBSERVATION PER HR: HCPCS

## 2020-06-11 RX ORDER — POTASSIUM CHLORIDE 7.45 MG/ML
10 INJECTION INTRAVENOUS PRN
Status: DISCONTINUED | OUTPATIENT
Start: 2020-06-11 | End: 2020-06-11 | Stop reason: HOSPADM

## 2020-06-11 RX ORDER — POTASSIUM CHLORIDE 20 MEQ/1
40 TABLET, EXTENDED RELEASE ORAL PRN
Status: DISCONTINUED | OUTPATIENT
Start: 2020-06-11 | End: 2020-06-11 | Stop reason: HOSPADM

## 2020-06-11 RX ADMIN — SENNOSIDES AND DOCUSATE SODIUM 2 TABLET: 8.6; 5 TABLET ORAL at 10:41

## 2020-06-11 RX ADMIN — LOSARTAN POTASSIUM 100 MG: 100 TABLET, FILM COATED ORAL at 10:42

## 2020-06-11 RX ADMIN — POTASSIUM CHLORIDE 40 MEQ: 1500 TABLET, EXTENDED RELEASE ORAL at 10:41

## 2020-06-11 RX ADMIN — ESCITALOPRAM OXALATE 5 MG: 10 TABLET ORAL at 10:41

## 2020-06-11 RX ADMIN — ASPIRIN 81 MG: 81 TABLET, COATED ORAL at 10:41

## 2020-06-11 RX ADMIN — MULTIPLE VITAMINS W/ MINERALS TAB 1 TABLET: TAB at 10:41

## 2020-06-11 RX ADMIN — AMLODIPINE BESYLATE 10 MG: 5 TABLET ORAL at 10:41

## 2020-06-11 RX ADMIN — CALCIUM 500 MG: 500 TABLET ORAL at 10:41

## 2020-06-11 RX ADMIN — FAMOTIDINE 20 MG: 20 TABLET, FILM COATED ORAL at 10:41

## 2020-06-11 RX ADMIN — NITROFURANTOIN MONOHYDRATE/MACROCRYSTALLINE 100 MG: 25; 75 CAPSULE ORAL at 10:41

## 2020-06-11 RX ADMIN — POLYETHYLENE GLYCOL 3350 17 G: 17 POWDER, FOR SOLUTION ORAL at 10:42

## 2020-06-11 RX ADMIN — CLOPIDOGREL 75 MG: 75 TABLET, FILM COATED ORAL at 10:41

## 2020-06-11 RX ADMIN — METHYLPHENIDATE HYDROCHLORIDE 10 MG: 10 TABLET ORAL at 06:46

## 2020-06-11 ASSESSMENT — PAIN SCALES - GENERAL: PAINLEVEL_OUTOF10: 0

## 2020-07-11 ENCOUNTER — APPOINTMENT (OUTPATIENT)
Dept: GENERAL RADIOLOGY | Age: 85
DRG: 069 | End: 2020-07-11
Payer: MEDICARE

## 2020-07-11 ENCOUNTER — HOSPITAL ENCOUNTER (INPATIENT)
Age: 85
LOS: 3 days | Discharge: HOME HEALTH | DRG: 069 | End: 2020-07-14
Attending: EMERGENCY MEDICINE | Admitting: INTERNAL MEDICINE
Payer: MEDICARE

## 2020-07-11 ENCOUNTER — APPOINTMENT (OUTPATIENT)
Dept: CT IMAGING | Age: 85
DRG: 069 | End: 2020-07-11
Payer: MEDICARE

## 2020-07-11 PROBLEM — G45.9 TIA (TRANSIENT ISCHEMIC ATTACK): Status: ACTIVE | Noted: 2020-07-11

## 2020-07-11 PROBLEM — Z20.822 SUSPECTED COVID-19 VIRUS INFECTION: Status: ACTIVE | Noted: 2020-07-11

## 2020-07-11 LAB
A/G RATIO: 1.2 (ref 1.1–2.2)
ALBUMIN SERPL-MCNC: 3.7 G/DL (ref 3.4–5)
ALP BLD-CCNC: 93 U/L (ref 40–129)
ALT SERPL-CCNC: 10 U/L (ref 10–40)
ANION GAP SERPL CALCULATED.3IONS-SCNC: 12 MMOL/L (ref 3–16)
AST SERPL-CCNC: 14 U/L (ref 15–37)
BASOPHILS ABSOLUTE: 0 K/UL (ref 0–0.2)
BASOPHILS RELATIVE PERCENT: 0.3 %
BILIRUB SERPL-MCNC: 1.8 MG/DL (ref 0–1)
BUN BLDV-MCNC: 16 MG/DL (ref 7–20)
CALCIUM SERPL-MCNC: 9.4 MG/DL (ref 8.3–10.6)
CHLORIDE BLD-SCNC: 100 MMOL/L (ref 99–110)
CO2: 25 MMOL/L (ref 21–32)
CREAT SERPL-MCNC: 0.8 MG/DL (ref 0.6–1.2)
EOSINOPHILS ABSOLUTE: 0.1 K/UL (ref 0–0.6)
EOSINOPHILS RELATIVE PERCENT: 0.8 %
GFR AFRICAN AMERICAN: >60
GFR NON-AFRICAN AMERICAN: >60
GLOBULIN: 3.1 G/DL
GLUCOSE BLD-MCNC: 108 MG/DL (ref 70–99)
GLUCOSE BLD-MCNC: 110 MG/DL (ref 70–99)
GLUCOSE BLD-MCNC: 111 MG/DL (ref 70–99)
HCT VFR BLD CALC: 35.1 % (ref 36–48)
HEMOGLOBIN: 12.2 G/DL (ref 12–16)
LIPASE: 15 U/L (ref 13–60)
LYMPHOCYTES ABSOLUTE: 1.1 K/UL (ref 1–5.1)
LYMPHOCYTES RELATIVE PERCENT: 13.3 %
MCH RBC QN AUTO: 28.9 PG (ref 26–34)
MCHC RBC AUTO-ENTMCNC: 34.8 G/DL (ref 31–36)
MCV RBC AUTO: 83.2 FL (ref 80–100)
MONOCYTES ABSOLUTE: 0.6 K/UL (ref 0–1.3)
MONOCYTES RELATIVE PERCENT: 6.7 %
NEUTROPHILS ABSOLUTE: 6.6 K/UL (ref 1.7–7.7)
NEUTROPHILS RELATIVE PERCENT: 78.9 %
PDW BLD-RTO: 16.4 % (ref 12.4–15.4)
PERFORMED ON: ABNORMAL
PERFORMED ON: ABNORMAL
PLATELET # BLD: 289 K/UL (ref 135–450)
PMV BLD AUTO: 8.2 FL (ref 5–10.5)
POTASSIUM SERPL-SCNC: 4 MMOL/L (ref 3.5–5.1)
PRO-BNP: 915 PG/ML (ref 0–449)
RBC # BLD: 4.22 M/UL (ref 4–5.2)
SODIUM BLD-SCNC: 137 MMOL/L (ref 136–145)
TOTAL PROTEIN: 6.8 G/DL (ref 6.4–8.2)
TROPONIN: <0.01 NG/ML
WBC # BLD: 8.4 K/UL (ref 4–11)

## 2020-07-11 PROCEDURE — 70450 CT HEAD/BRAIN W/O DYE: CPT

## 2020-07-11 PROCEDURE — 93005 ELECTROCARDIOGRAM TRACING: CPT | Performed by: EMERGENCY MEDICINE

## 2020-07-11 PROCEDURE — 96375 TX/PRO/DX INJ NEW DRUG ADDON: CPT

## 2020-07-11 PROCEDURE — 71045 X-RAY EXAM CHEST 1 VIEW: CPT

## 2020-07-11 PROCEDURE — 83690 ASSAY OF LIPASE: CPT

## 2020-07-11 PROCEDURE — 94761 N-INVAS EAR/PLS OXIMETRY MLT: CPT

## 2020-07-11 PROCEDURE — 1200000000 HC SEMI PRIVATE

## 2020-07-11 PROCEDURE — 73030 X-RAY EXAM OF SHOULDER: CPT

## 2020-07-11 PROCEDURE — 84484 ASSAY OF TROPONIN QUANT: CPT

## 2020-07-11 PROCEDURE — 85025 COMPLETE CBC W/AUTO DIFF WBC: CPT

## 2020-07-11 PROCEDURE — 99285 EMERGENCY DEPT VISIT HI MDM: CPT

## 2020-07-11 PROCEDURE — G0378 HOSPITAL OBSERVATION PER HR: HCPCS

## 2020-07-11 PROCEDURE — 80053 COMPREHEN METABOLIC PANEL: CPT

## 2020-07-11 PROCEDURE — 6360000002 HC RX W HCPCS: Performed by: NURSE PRACTITIONER

## 2020-07-11 PROCEDURE — 6360000004 HC RX CONTRAST MEDICATION: Performed by: EMERGENCY MEDICINE

## 2020-07-11 PROCEDURE — 96374 THER/PROPH/DIAG INJ IV PUSH: CPT

## 2020-07-11 PROCEDURE — 36415 COLL VENOUS BLD VENIPUNCTURE: CPT

## 2020-07-11 PROCEDURE — 6360000002 HC RX W HCPCS

## 2020-07-11 PROCEDURE — 83880 ASSAY OF NATRIURETIC PEPTIDE: CPT

## 2020-07-11 PROCEDURE — 70496 CT ANGIOGRAPHY HEAD: CPT

## 2020-07-11 RX ORDER — M-VIT,TX,IRON,MINS/CALC/FOLIC 27MG-0.4MG
1 TABLET ORAL DAILY
Status: DISCONTINUED | OUTPATIENT
Start: 2020-07-12 | End: 2020-07-14 | Stop reason: HOSPADM

## 2020-07-11 RX ORDER — POTASSIUM CHLORIDE 20 MEQ/1
40 TABLET, EXTENDED RELEASE ORAL PRN
Status: DISCONTINUED | OUTPATIENT
Start: 2020-07-11 | End: 2020-07-14 | Stop reason: HOSPADM

## 2020-07-11 RX ORDER — METHYLPREDNISOLONE SODIUM SUCCINATE 125 MG/2ML
125 INJECTION, POWDER, LYOPHILIZED, FOR SOLUTION INTRAMUSCULAR; INTRAVENOUS ONCE
Status: COMPLETED | OUTPATIENT
Start: 2020-07-11 | End: 2020-07-11

## 2020-07-11 RX ORDER — ATORVASTATIN CALCIUM 40 MG/1
40 TABLET, FILM COATED ORAL NIGHTLY
Status: DISCONTINUED | OUTPATIENT
Start: 2020-07-11 | End: 2020-07-14 | Stop reason: HOSPADM

## 2020-07-11 RX ORDER — DIPHENHYDRAMINE HYDROCHLORIDE 50 MG/ML
25 INJECTION INTRAMUSCULAR; INTRAVENOUS ONCE
Status: COMPLETED | OUTPATIENT
Start: 2020-07-11 | End: 2020-07-11

## 2020-07-11 RX ORDER — LOSARTAN POTASSIUM 100 MG/1
100 TABLET ORAL DAILY
Status: DISCONTINUED | OUTPATIENT
Start: 2020-07-12 | End: 2020-07-14 | Stop reason: HOSPADM

## 2020-07-11 RX ORDER — SODIUM CHLORIDE 0.9 % (FLUSH) 0.9 %
10 SYRINGE (ML) INJECTION EVERY 12 HOURS SCHEDULED
Status: DISCONTINUED | OUTPATIENT
Start: 2020-07-11 | End: 2020-07-14 | Stop reason: HOSPADM

## 2020-07-11 RX ORDER — FAMOTIDINE 20 MG/1
20 TABLET, FILM COATED ORAL DAILY
Status: DISCONTINUED | OUTPATIENT
Start: 2020-07-12 | End: 2020-07-14 | Stop reason: HOSPADM

## 2020-07-11 RX ORDER — CALCIUM CARBONATE 500(1250)
500 TABLET ORAL DAILY
Status: DISCONTINUED | OUTPATIENT
Start: 2020-07-12 | End: 2020-07-14 | Stop reason: HOSPADM

## 2020-07-11 RX ORDER — BIOTIN 1 MG
1000 TABLET ORAL
Status: DISCONTINUED | OUTPATIENT
Start: 2020-07-11 | End: 2020-07-11

## 2020-07-11 RX ORDER — 0.9 % SODIUM CHLORIDE 0.9 %
500 INTRAVENOUS SOLUTION INTRAVENOUS PRN
Status: DISCONTINUED | OUTPATIENT
Start: 2020-07-11 | End: 2020-07-14 | Stop reason: HOSPADM

## 2020-07-11 RX ORDER — FAMOTIDINE 20 MG/1
20 TABLET, FILM COATED ORAL 2 TIMES DAILY
Status: DISCONTINUED | OUTPATIENT
Start: 2020-07-11 | End: 2020-07-11 | Stop reason: DRUGHIGH

## 2020-07-11 RX ORDER — POTASSIUM CHLORIDE 7.45 MG/ML
10 INJECTION INTRAVENOUS PRN
Status: DISCONTINUED | OUTPATIENT
Start: 2020-07-11 | End: 2020-07-14 | Stop reason: HOSPADM

## 2020-07-11 RX ORDER — PROMETHAZINE HYDROCHLORIDE 25 MG/1
12.5 TABLET ORAL EVERY 6 HOURS PRN
Status: DISCONTINUED | OUTPATIENT
Start: 2020-07-11 | End: 2020-07-14 | Stop reason: HOSPADM

## 2020-07-11 RX ORDER — ESCITALOPRAM OXALATE 10 MG/1
5 TABLET ORAL DAILY
Status: DISCONTINUED | OUTPATIENT
Start: 2020-07-12 | End: 2020-07-14 | Stop reason: HOSPADM

## 2020-07-11 RX ORDER — ASPIRIN 300 MG/1
300 SUPPOSITORY RECTAL DAILY
Status: DISCONTINUED | OUTPATIENT
Start: 2020-07-12 | End: 2020-07-14 | Stop reason: HOSPADM

## 2020-07-11 RX ORDER — LABETALOL HYDROCHLORIDE 5 MG/ML
10 INJECTION, SOLUTION INTRAVENOUS EVERY 10 MIN PRN
Status: DISCONTINUED | OUTPATIENT
Start: 2020-07-11 | End: 2020-07-14 | Stop reason: HOSPADM

## 2020-07-11 RX ORDER — SODIUM CHLORIDE 0.9 % (FLUSH) 0.9 %
10 SYRINGE (ML) INJECTION PRN
Status: DISCONTINUED | OUTPATIENT
Start: 2020-07-11 | End: 2020-07-14 | Stop reason: HOSPADM

## 2020-07-11 RX ORDER — ONDANSETRON 2 MG/ML
4 INJECTION INTRAMUSCULAR; INTRAVENOUS EVERY 6 HOURS PRN
Status: DISCONTINUED | OUTPATIENT
Start: 2020-07-11 | End: 2020-07-14 | Stop reason: HOSPADM

## 2020-07-11 RX ORDER — CLOPIDOGREL BISULFATE 75 MG/1
75 TABLET ORAL DAILY
Status: DISCONTINUED | OUTPATIENT
Start: 2020-07-12 | End: 2020-07-14 | Stop reason: HOSPADM

## 2020-07-11 RX ORDER — HYDRALAZINE HYDROCHLORIDE 20 MG/ML
10 INJECTION INTRAMUSCULAR; INTRAVENOUS EVERY 6 HOURS PRN
Status: DISCONTINUED | OUTPATIENT
Start: 2020-07-11 | End: 2020-07-14 | Stop reason: HOSPADM

## 2020-07-11 RX ORDER — ASPIRIN 81 MG/1
81 TABLET ORAL DAILY
Status: DISCONTINUED | OUTPATIENT
Start: 2020-07-12 | End: 2020-07-14 | Stop reason: HOSPADM

## 2020-07-11 RX ORDER — ASPIRIN 81 MG/1
81 TABLET ORAL DAILY
Status: DISCONTINUED | OUTPATIENT
Start: 2020-07-12 | End: 2020-07-11 | Stop reason: SDUPTHER

## 2020-07-11 RX ORDER — POTASSIUM CHLORIDE 20 MEQ/1
20 TABLET, EXTENDED RELEASE ORAL
Status: DISCONTINUED | OUTPATIENT
Start: 2020-07-12 | End: 2020-07-14 | Stop reason: HOSPADM

## 2020-07-11 RX ORDER — AMLODIPINE BESYLATE 5 MG/1
10 TABLET ORAL DAILY
Status: DISCONTINUED | OUTPATIENT
Start: 2020-07-12 | End: 2020-07-14 | Stop reason: HOSPADM

## 2020-07-11 RX ORDER — DIPHENHYDRAMINE HYDROCHLORIDE 50 MG/ML
INJECTION INTRAMUSCULAR; INTRAVENOUS
Status: COMPLETED
Start: 2020-07-11 | End: 2020-07-11

## 2020-07-11 RX ADMIN — DIPHENHYDRAMINE HYDROCHLORIDE 25 MG: 50 INJECTION, SOLUTION INTRAMUSCULAR; INTRAVENOUS at 13:21

## 2020-07-11 RX ADMIN — METHYLPREDNISOLONE SODIUM SUCCINATE 125 MG: 125 INJECTION, POWDER, FOR SOLUTION INTRAMUSCULAR; INTRAVENOUS at 13:21

## 2020-07-11 RX ADMIN — DIPHENHYDRAMINE HYDROCHLORIDE 25 MG: 50 INJECTION INTRAMUSCULAR; INTRAVENOUS at 13:21

## 2020-07-11 RX ADMIN — IOPAMIDOL 75 ML: 755 INJECTION, SOLUTION INTRAVENOUS at 13:18

## 2020-07-11 ASSESSMENT — PAIN DESCRIPTION - PAIN TYPE: TYPE: ACUTE PAIN

## 2020-07-11 ASSESSMENT — ENCOUNTER SYMPTOMS
SHORTNESS OF BREATH: 0
VOMITING: 0
CHEST TIGHTNESS: 0
DIARRHEA: 0
NAUSEA: 0
ABDOMINAL DISTENTION: 0

## 2020-07-11 ASSESSMENT — PAIN SCALES - GENERAL: PAINLEVEL_OUTOF10: 10

## 2020-07-11 NOTE — ED PROVIDER NOTES
[address] [facility]        Pt Name: Elizabeth Hall  MRN: [id number]  Birthdate 1934  Date of evaluation: 7/11/2020  Provider: GISELL Mccracken - SANJUANITA  PCP: Cheryl Dutton MD     I have seen and evaluated this patient with my supervising physician Geneva Deng MD.    [facility]       Chief Complaint   Patient presents with    Arm Pain     Pt to Er with c/o pain to right side under arm pit since yesterday, denies injury. family states concerned for stroke, states hx, previous right side deficit. pt denies weakness, states \"its just the pain\"       HISTORY OF PRESENT ILLNESS   (Location, Timing/Onset, Context/Setting, Quality, Duration, Modifying Factors, Severity, Associated Signs and Symptoms)  Note limiting factors. Elizabeth Hall is a 80 y.o. female Presents to the ER with a complaint of right sided pain that began sometime overnight. She reports that she is here for an xray. Patient does have difficulty offering history, she does have history of stroke. Apparently, family dropped patient off. Tried to call family, left message. Son, Irma Reddy, is outside. Asked him to come inside to give history. 1250- son, blaine did come to bedside. Reports that his mother fell about a week ago. Did not believe that she injured herself. States that she has been very active recently with history of stroke in March, acute cortical infarct. States that she was back in the hospital in May and thought maybe she had a urinary tract infection versus may be a second mini stroke. Reports that she has been home for some time and is very independent, she is supposed to be walking with a walker but she is generally found without the walker. He reports that yesterday she was doing laundry and went to bed around 9 PM.  Had a normal, productive day. States that he was unable to get her up today.   When he was taking her to the car she was dragging her right leg and had limited use of the right arm. She does have history of a aphasia secondary to stroke in March. Her speech pattern is normal today. Nursing Notes were all reviewed and agreed with or any disagreements were addressed in the HPI. REVIEW OF SYSTEMS    (2-9 systems for level 4, 10 or more for level 5)     Review of Systems   Constitutional: Negative for activity change, chills and fever. Respiratory: Negative for chest tightness and shortness of breath. Cardiovascular: Negative for chest pain. Gastrointestinal: Negative for abdominal distention, diarrhea, nausea and vomiting. Genitourinary: Negative for dysuria. Musculoskeletal:        Right arm pain   All other systems reviewed and are negative. Positives and Pertinent negatives as per HPI. Except as noted above in the ROS, all other systems were reviewed and negative.        PAST MEDICAL HISTORY     Past Medical History:   Diagnosis Date    Arthritis     CAD (coronary artery disease)     GERD (gastroesophageal reflux disease)     Hx of blood clots     phlebitis    Hyperlipidemia     Hypertension          SURGICAL HISTORY     Past Surgical History:   Procedure Laterality Date    APPENDECTOMY      BREAST SURGERY      cyst removal    CHOLECYSTECTOMY      ENDOSCOPY, COLON, DIAGNOSTIC      egd w/ dilitation    EYE SURGERY      cataract, bilat    JOINT REPLACEMENT      right shoulder    VARICOSE VEIN SURGERY           CURRENTMEDICATIONS       Previous Medications    AMLODIPINE (NORVASC) 10 MG TABLET    Take 1 tablet by mouth daily    ASPIRIN 81 MG EC TABLET    Take 1 tablet by mouth daily    ATORVASTATIN (LIPITOR) 40 MG TABLET    Take 1 tablet by mouth nightly    BIOTIN 1000 MCG TABS    Take 1,000 mcg by mouth Daily with supper    CALCIUM CARBONATE (OSCAL) 500 MG TABS TABLET    Take 500 mg by mouth daily    CLOPIDOGREL (PLAVIX) 75 MG TABLET    Take 1 tablet by mouth daily    [address]   Phone [phone]   BRAIN NATRIURETIC PEPTIDE - Abnormal; Notable for the following components:    Pro- (*)     All other components within normal limits    Narrative:     Performed at:  [facility] [address]   Phone [phone]   POCT GLUCOSE - Abnormal; Notable for the following components:    POC Glucose 111 (*)     All other components within normal limits    Narrative:     Performed at:  [facility] [address]   Phone [phone]   POCT GLUCOSE - Abnormal; Notable for the following components:    POC Glucose 110 (*)     All other components within normal limits    Narrative:     Performed at:  [facility]  [address]   Phone [phone]   TROPONIN    Narrative:     Performed at:  [facility] [address]   Phone [phone]   LIPASE    Narrative:     Performed at:  [facility] [email or web address]   Phone [phone]   URINE RT REFLEX TO CULTURE       All other labs were within normal range or not returned as of this dictation. EKG: All EKG's are interpreted by the Emergency Department Physician in the absence of a cardiologist.  Please see their note for interpretation of EKG. RADIOLOGY:   Non-plain film images such as CT, Ultrasound and MRI are read by the radiologist. Plain radiographic images are visualized and preliminarily interpreted by the ED Provider with the below findings:        Interpretation per the Radiologist below, if available at the time of this note:    XR CHEST PORTABLE   Final Result   No acute findings         XR SHOULDER RIGHT (MIN 2 VIEWS)   Final Result   No acute findings.          CTA HEAD NECK W CONTRAST   Final Result   No significant stenosis or occlusion in the cervical or intracranial   vasculature. CT Head WO Contrast   Final Result   No acute intracranial abnormality. No significant change in appearance of the head. Critical results were called by Dr. Toni Clemente MD to Lizbetradha White on   7/11/2020 at 13:19.         CT Head WO Contrast    (Results Pending)     No results found. PROCEDURES   Unless otherwise noted below, none     Procedures    CRITICAL CARE TIME   The total critical care time spent while evaluating and treating this patient was at least 32 minutes. This excludes time spent doing separately billable procedures. This includes time at the bedside, data interpretation, medication management, obtaining critical history from collateral sources if the patient is unable to provide it directly, and physician consultation. Specifics of interventions taken and potentially life-threatening diagnostic considerations are listed above in the medical decision making. CONSULTS:  None      EMERGENCY DEPARTMENT COURSE and DIFFERENTIAL DIAGNOSIS/MDM:   Vitals:    Vitals:    07/11/20 1700 07/11/20 1715 07/11/20 1730 07/11/20 1745   BP: 138/62 (!) 144/60 (!) 117/52 128/65   Pulse: 81 90 82 87   Resp: 21 18 20 18   Temp:       TempSrc:       SpO2: 95% 98% 96% 96%   Weight:       Height:           Patient was given the following medications:  Medications   iopamidol (ISOVUE-370) 76 % injection 75 mL (75 mLs Intravenous Given 7/11/20 1318)   methylPREDNISolone sodium (SOLU-MEDROL) injection 125 mg (125 mg Intravenous Given 7/11/20 1321)   diphenhydrAMINE (BENADRYL) injection 25 mg (25 mg Intravenous Given 7/11/20 1321)           Briefly, this a 80year old female that  Presents to the ER with a complaint of right sided pain that began sometime overnight. She reports that she is here for an xray. Patient does have difficulty offering history, she does have history of stroke. Apparently, family dropped patient off.     I did call two numbers list, left message and then spoke with Jannet Delgado. Asked him to please come inside to give history. Impression    Multiple subcentimeter cortical based acute infarcts within the anterior    aspect of the left frontal lobe consistent with acute cortical infarcts    within the left anterior cerebral artery territory.         No acute intracranial hemorrhage.         Mild cerebral volume loss and moderate chronic small vessel ischemic changes.         The findings were sent to the Radiology Results [address] at 9:09    am on 4/18/2020to be communicated to a licensed caregiver.           1250- son, blaine did come to bedside. Reports that his mother fell about a week ago. Did not believe that she injured herself. States that she has been very active recently with history of stroke in March, acute cortical infarct. States that she was back in the hospital in May and thought maybe she had a urinary tract infection versus may be a second mini stroke. Reports that she has been home for some time and is very independent, she is supposed to be walking with a walker but she is generally found without the walker. He reports that yesterday she was doing laundry and went to bed around 9 PM.  Had a normal, productive day. States that he was unable to get her up today. When he was taking her to the car she was dragging her right leg and had limited use of the right arm. She does have history of a aphasia secondary to stroke in March. Her speech pattern is normal today. Family goes on to report that she has had some confusion lasting over the past several weeks. States that she is generally spry. Son reports that she thinks that he is her brother. In the next day recognizes him as her son. Stroke alert called, she went to bed at 2100 and awoke at 0900 this morning. I did speak with [facility] stroke team doctor. He does not recommend TPA.   Patient does not have symptoms specific for stroke. She has difficulty lifting the right arm, but is able to hold the arm up without difficulty. She does have shoulder pain. XR CHEST PORTABLE (Final result)   Result time 07/11/20 14:22:48   Final result by Abby Vinson MD (07/11/20 14:22:48)                 Impression:     No acute findings               XR SHOULDER RIGHT (MIN 2 VIEWS) (Final result)   Result time 07/11/20 14:22:28   Final result by Abby Vinson MD (07/11/20 14:22:28)                 Impression:     No acute findings. CTA HEAD NECK W CONTRAST (Final result)   Result time 07/11/20 14:06:52   Final result by Palmer Roy MD (07/11/20 14:06:52)                 Impression:     No significant stenosis or occlusion in the cervical or intracranial   vasculature              CT Head WO Contrast (Final result)   Result time 07/11/20 13:19:53   Final result by Abby Vinson MD (07/11/20 13:19:53)                 Impression:     No acute intracranial abnormality. No significant change in appearance of the head. Critical results were called by Dr. Alvarado Peterson MD to Kiran Hutchins on   7/11/2020 at 13:19. Labs were unremarkable. Family does want this patient admitted for full stroke rule out, we are unable to provide MRI  In the ER. Dr. Imani Granado does accept this patient in admission. FINAL IMPRESSION      1. TIA (transient ischemic attack)          DISPOSITION/PLAN   DISPOSITION Decision To Admit 07/11/2020 06:10:45 PM      PATIENT REFERREDTO:  No follow-up provider specified.     DISCHARGE MEDICATIONS:  New Prescriptions    No medications on file       DISCONTINUED MEDICATIONS:  Discontinued Medications    No medications on file              (Please note that portions of this note were completed with a voice recognition program.  Efforts were made to edit the dictations but occasionally words are mis-transcribed.)    Ros Rm, GISELL - SANJUANITA (electronically signed)

## 2020-07-11 NOTE — ED PROVIDER NOTES
I independently performed a history and physical on Hanna Ceballos. All diagnostic, treatment, and disposition decisions were made by myself in conjunction with the advanced practice provider. Briefly, this is a 80 y.o. female here for concern for stroke. This morning, woke up and had right-sided leg weakness. Per her son, she was dragging her leg. Does have history of stroke and aphasia. Patient complains of right-sided arm/shoulder pain which she attributes to a fall from about a week ago. No numbness or tingling. Last known normal was 9 PM.  No fevers or chills or headache or neck stiffness    On exam,   General: Patient is in no acute distress  Skin: No cyanosis  HEENT: Moist mucous membranes  Heart: Regular rate, regular rhythm  Lung: No respiratory distress  Abdomen: Soft, nontender  Neuro: Moving all extremities, no facial droop, no slurred speech, there is expressive a aphasia . follows complex commands . Normal sensation to light palpation over arms and legs. Able to repeat words well.  5 out of 5 strength bilateral hip/knee/ankle flexors/extensors. 5 out of 5 strength bilateral arm elbow, wrist flexors/extensors. 4/5 strength right shoulder flexion which seems to be secondary to pain. EKG  The Ekg interpreted by me in the absence of a cardiologist shows. Normal sinus rhythm  No acute ST changes or T wave abnormalities     Screenings  NIH Stroke Scale  Interval: Reassessment  Level of Consciousness (1a. ): Alert  LOC Questions (1b. ):  Answers both correctly  LOC Commands (1c. ): Performs both tasks correctly  Best Gaze (2. ): Normal  Visual (3. ): No visual loss  Facial Palsy (4. ): Normal symmetrical movement  Motor Arm, Left (5a. ): No drift  Motor Arm, Right (5b. ): No drift  Motor Leg, Left (6a. ): No drift  Motor Leg, Right (6b. ): No drift  Limb Ataxia (7. ): (!) Present in one limb  Sensory (8. ): (!) Mild to Moderate(d/t possible asphagia )  Best Language (9. ): Mild to moderate aphasia  Dysarthria (10. ): Normal  Extinction and Inattention (11): No abnormality  Total: 3Glasgow Coma Scale  Eye Opening: Spontaneous  Best Verbal Response: Oriented  Best Motor Response: Obeys commands  Marline Coma Scale Score: 15        MDM  Patient is a 27-year-old woman who presents with right leg foot dragging on wake-up that is now resolved. May be secondary to TIA. Has expressive aphasia at this time which appears to be her baseline per her son. Not a candidate for TPA at this time. Activated as stroke alert. Per her son, also having confusion. Was being confused with her other son. Will check for metabolic/infectious etiologies of confusion. Patient Referrals:  [address] [place] [address], [address] [address]  [phone]            Discharge Medications:  Current Discharge Medication List          FINAL IMPRESSION  1. TIA (transient ischemic attack)        Blood pressure 123/70, pulse 87, temperature 97.5 °F (36.4 °C), temperature source Oral, resp. rate 20, height 5' 3\" (1.6 m), weight 135 lb (61.2 kg), SpO2 96 %. For further details of [address]  emergency department encounter, please see documentation by advanced practice provider, Nelly Sanabria.        Shannon Tinsley MD  07/12/20 6582

## 2020-07-11 NOTE — ED NOTES
Son at bedside, states confusion on and off. Pt fell one week ago landing on her right side and son states she had trouble walking today and c/o right sided pain. Pt thinks her brother is in the room and confusion is noted.       Lisandra Zamora RN  07/11/20 5506

## 2020-07-12 ENCOUNTER — APPOINTMENT (OUTPATIENT)
Dept: GENERAL RADIOLOGY | Age: 85
DRG: 069 | End: 2020-07-12
Payer: MEDICARE

## 2020-07-12 LAB
A/G RATIO: 1 (ref 1.1–2.2)
ALBUMIN SERPL-MCNC: 3.4 G/DL (ref 3.4–5)
ALP BLD-CCNC: 90 U/L (ref 40–129)
ALT SERPL-CCNC: 8 U/L (ref 10–40)
ANION GAP SERPL CALCULATED.3IONS-SCNC: 14 MMOL/L (ref 3–16)
AST SERPL-CCNC: 11 U/L (ref 15–37)
BASOPHILS ABSOLUTE: 0 K/UL (ref 0–0.2)
BASOPHILS RELATIVE PERCENT: 0.1 %
BILIRUB SERPL-MCNC: 1 MG/DL (ref 0–1)
BUN BLDV-MCNC: 20 MG/DL (ref 7–20)
C-REACTIVE PROTEIN: 172.9 MG/L (ref 0–5.1)
CALCIUM SERPL-MCNC: 9.5 MG/DL (ref 8.3–10.6)
CHLORIDE BLD-SCNC: 101 MMOL/L (ref 99–110)
CHOLESTEROL, TOTAL: 156 MG/DL (ref 0–199)
CO2: 24 MMOL/L (ref 21–32)
CREAT SERPL-MCNC: 0.7 MG/DL (ref 0.6–1.2)
D DIMER: 968 NG/ML DDU (ref 0–229)
EOSINOPHILS ABSOLUTE: 0 K/UL (ref 0–0.6)
EOSINOPHILS RELATIVE PERCENT: 0 %
FERRITIN: 276.2 NG/ML (ref 15–150)
FIBRINOGEN: 753 MG/DL (ref 200–397)
GFR AFRICAN AMERICAN: >60
GFR NON-AFRICAN AMERICAN: >60
GLOBULIN: 3.3 G/DL
GLUCOSE BLD-MCNC: 152 MG/DL (ref 70–99)
HCT VFR BLD CALC: 35 % (ref 36–48)
HDLC SERPL-MCNC: 65 MG/DL (ref 40–60)
HEMOGLOBIN: 11.9 G/DL (ref 12–16)
LDL CHOLESTEROL CALCULATED: 82 MG/DL
LYMPHOCYTES ABSOLUTE: 0.9 K/UL (ref 1–5.1)
LYMPHOCYTES RELATIVE PERCENT: 18.6 %
MCH RBC QN AUTO: 28.3 PG (ref 26–34)
MCHC RBC AUTO-ENTMCNC: 34.1 G/DL (ref 31–36)
MCV RBC AUTO: 83.1 FL (ref 80–100)
MONOCYTES ABSOLUTE: 0.1 K/UL (ref 0–1.3)
MONOCYTES RELATIVE PERCENT: 2.3 %
NEUTROPHILS ABSOLUTE: 3.9 K/UL (ref 1.7–7.7)
NEUTROPHILS RELATIVE PERCENT: 79 %
PDW BLD-RTO: 16.6 % (ref 12.4–15.4)
PLATELET # BLD: 299 K/UL (ref 135–450)
PMV BLD AUTO: 8.3 FL (ref 5–10.5)
POTASSIUM REFLEX MAGNESIUM: 3.8 MMOL/L (ref 3.5–5.1)
RBC # BLD: 4.21 M/UL (ref 4–5.2)
SODIUM BLD-SCNC: 139 MMOL/L (ref 136–145)
TOTAL PROTEIN: 6.7 G/DL (ref 6.4–8.2)
TRIGL SERPL-MCNC: 45 MG/DL (ref 0–150)
VLDLC SERPL CALC-MCNC: 9 MG/DL
WBC # BLD: 4.9 K/UL (ref 4–11)

## 2020-07-12 PROCEDURE — 6360000002 HC RX W HCPCS: Performed by: INTERNAL MEDICINE

## 2020-07-12 PROCEDURE — 71045 X-RAY EXAM CHEST 1 VIEW: CPT

## 2020-07-12 PROCEDURE — 96372 THER/PROPH/DIAG INJ SC/IM: CPT

## 2020-07-12 PROCEDURE — 2580000003 HC RX 258: Performed by: INTERNAL MEDICINE

## 2020-07-12 PROCEDURE — 86140 C-REACTIVE PROTEIN: CPT

## 2020-07-12 PROCEDURE — 85379 FIBRIN DEGRADATION QUANT: CPT

## 2020-07-12 PROCEDURE — 1200000000 HC SEMI PRIVATE

## 2020-07-12 PROCEDURE — 99222 1ST HOSP IP/OBS MODERATE 55: CPT | Performed by: PSYCHIATRY & NEUROLOGY

## 2020-07-12 PROCEDURE — 83036 HEMOGLOBIN GLYCOSYLATED A1C: CPT

## 2020-07-12 PROCEDURE — 80061 LIPID PANEL: CPT

## 2020-07-12 PROCEDURE — 85025 COMPLETE CBC W/AUTO DIFF WBC: CPT

## 2020-07-12 PROCEDURE — 82728 ASSAY OF FERRITIN: CPT

## 2020-07-12 PROCEDURE — 85384 FIBRINOGEN ACTIVITY: CPT

## 2020-07-12 PROCEDURE — 6370000000 HC RX 637 (ALT 250 FOR IP): Performed by: INTERNAL MEDICINE

## 2020-07-12 PROCEDURE — G0378 HOSPITAL OBSERVATION PER HR: HCPCS

## 2020-07-12 PROCEDURE — 94760 N-INVAS EAR/PLS OXIMETRY 1: CPT

## 2020-07-12 PROCEDURE — U0003 INFECTIOUS AGENT DETECTION BY NUCLEIC ACID (DNA OR RNA); SEVERE ACUTE RESPIRATORY SYNDROME CORONAVIRUS 2 (SARS-COV-2) (CORONAVIRUS DISEASE [COVID-19]), AMPLIFIED PROBE TECHNIQUE, MAKING USE OF HIGH THROUGHPUT TECHNOLOGIES AS DESCRIBED BY CMS-2020-01-R: HCPCS

## 2020-07-12 PROCEDURE — 36415 COLL VENOUS BLD VENIPUNCTURE: CPT

## 2020-07-12 PROCEDURE — 80053 COMPREHEN METABOLIC PANEL: CPT

## 2020-07-12 RX ADMIN — Medication 10 ML: at 09:01

## 2020-07-12 RX ADMIN — FAMOTIDINE 20 MG: 20 TABLET, FILM COATED ORAL at 09:01

## 2020-07-12 RX ADMIN — CALCIUM 500 MG: 500 TABLET ORAL at 09:01

## 2020-07-12 RX ADMIN — AMLODIPINE BESYLATE 10 MG: 5 TABLET ORAL at 09:00

## 2020-07-12 RX ADMIN — ASPIRIN 81 MG: 81 TABLET, COATED ORAL at 09:01

## 2020-07-12 RX ADMIN — DESMOPRESSIN ACETATE 40 MG: 0.2 TABLET ORAL at 21:44

## 2020-07-12 RX ADMIN — Medication 10 ML: at 21:44

## 2020-07-12 RX ADMIN — POTASSIUM CHLORIDE 20 MEQ: 1500 TABLET, EXTENDED RELEASE ORAL at 09:00

## 2020-07-12 RX ADMIN — POTASSIUM CHLORIDE 20 MEQ: 1500 TABLET, EXTENDED RELEASE ORAL at 16:25

## 2020-07-12 RX ADMIN — ENOXAPARIN SODIUM 40 MG: 40 INJECTION SUBCUTANEOUS at 09:00

## 2020-07-12 RX ADMIN — ESCITALOPRAM OXALATE 5 MG: 10 TABLET ORAL at 09:01

## 2020-07-12 RX ADMIN — POTASSIUM CHLORIDE 20 MEQ: 1500 TABLET, EXTENDED RELEASE ORAL at 11:52

## 2020-07-12 RX ADMIN — CLOPIDOGREL 75 MG: 75 TABLET, FILM COATED ORAL at 09:01

## 2020-07-12 RX ADMIN — LOSARTAN POTASSIUM 100 MG: 100 TABLET, FILM COATED ORAL at 09:00

## 2020-07-12 RX ADMIN — MULTIPLE VITAMINS W/ MINERALS TAB 1 TABLET: TAB at 09:01

## 2020-07-12 ASSESSMENT — PAIN SCALES - GENERAL
PAINLEVEL_OUTOF10: 0

## 2020-07-12 NOTE — PLAN OF CARE
Problem: Falls - Risk of:  Goal: Will remain free from falls  Description: Will remain free from falls  Outcome: Ongoing     Problem: HEMODYNAMIC STATUS  Goal: Patient has stable vital signs and fluid balance  Outcome: Ongoing     Problem: ACTIVITY INTOLERANCE/IMPAIRED MOBILITY  Goal: Mobility/activity is maintained at optimum level for patient  Outcome: Ongoing     Problem: COMMUNICATION IMPAIRMENT  Goal: Ability to express needs and understand communication  Outcome: Ongoing

## 2020-07-12 NOTE — H&P
History and Physical  Dr. Marshall Wang  7/11/2020    PCP: Christa Peace MD    Cc:   Chief Complaint   Patient presents with    Arm Pain     Pt to Er with c/o pain to right side under arm pit since yesterday, denies injury. family states concerned for stroke, states hx, previous right side deficit. pt denies weakness, states \"its just the pain\"       HPI:  Amos Campuzano is a 80 y.o. female who has a past medical history of Arthritis, CAD (coronary artery disease), GERD (gastroesophageal reflux disease), Hx of blood clots, Hyperlipidemia, and Hypertension. Patient presents with TIA (transient ischemic attack). HPI   80 y.o. female Presents to the ER with a complaint of right sided pain that began sometime overnight. She reports that she is here for an xray. Patient does have difficulty offering history, she does have history of stroke. Family comes into ER and gives some other history - Reports that his mother fell about a week ago. Did not believe that she injured herself. States that she has been very active recently with history of stroke in March, acute cortical infarct. States that she was back in the hospital in May and thought maybe she had a urinary tract infection versus may be a second mini stroke. Reports that she has been home for some time and is very independent, she is supposed to be walking with a walker but she is generally found without the walker. He reports that yesterday she was doing laundry and went to bed around 9 PM.  Had a normal, productive day. States that he was unable to get her up today. When he was taking her to the car she was dragging her right leg and had limited use of the right arm.     She does have history of a aphasia secondary to stroke in March. Her speech pattern is normal today. Pt to be admitted for further workup  Hard to get much other hx nor ROS directly from pt due to dementia      Problem list of hospitalization thus far:   C/Wilian Caban 7662 Problems    Diagnosis    TIA (transient ischemic attack) [G45.9]    Suspected COVID-19 virus infection [Z20.828]    Expressive aphasia [R47.01]    Essential hypertension [I10]    Ataxia [R27.0]         Review of Systems: (1 system for EPF, 2-9 for detailed, 10+ for comprehensive)  Review of Systems   Unable to perform ROS: Dementia           Past Medical History:   Past Medical History:   Diagnosis Date    Arthritis     CAD (coronary artery disease)     GERD (gastroesophageal reflux disease)     Hx of blood clots     phlebitis    Hyperlipidemia     Hypertension        Past Surgical History:   Past Surgical History:   Procedure Laterality Date    APPENDECTOMY      BREAST SURGERY      cyst removal    CHOLECYSTECTOMY      ENDOSCOPY, COLON, DIAGNOSTIC      egd w/ dilitation    EYE SURGERY      cataract, bilat    JOINT REPLACEMENT      right shoulder    VARICOSE VEIN SURGERY         Social History:   Social History     Tobacco History     Smoking Status  Never Smoker    Smokeless Tobacco Use  Never Used          Alcohol History     Alcohol Use Status  No          Drug Use     Drug Use Status  No          Sexual Activity     Sexually Active  Yes Partners  Male                Fam History: History reviewed. No pertinent family history. PFSH: The above PMHx, PSHx, SocHx, FamHx has been reviewed by myself. (1 area for detailed, 2-3 for comprehensive)      Code Status: Full Code    Meds - following list of home medications fromelectronic chart has been reviewed by myself  Prior to Admission medications    Medication Sig Start Date End Date Taking?  Authorizing Provider   potassium chloride (KLOR-CON M) 20 MEQ extended release tablet Take 1 tablet by mouth 3 times daily 6/10/20  Yes Jocelyn Brownlee MD   aspirin 81 MG EC tablet Take 1 tablet by mouth daily 5/20/20  Yes Yesi Post MD   escitalopram (LEXAPRO) 5 MG tablet Take 1 tablet by mouth daily 5/20/20  Yes Yesi Post MD   amLODIPine [facility]) normal, conjunctivae and sclerae normal and pupils equal, round, reactive to light and accomodation  EMNT: external ears normal  Neck: no JVD, supple, symmetrical, trachea midline and thyroid not enlarged, symmetric, no tenderness/mass/nodules   Respiratory: clear to auscultation and percussion bilaterally with normal respiratory effort  Cardiovascular: normal rate, regular rhythm, normal S1 and S2 and no gallops  Gastrointestinal: soft, non-tender, non-distended, normal bowel sounds, no masses or organomegaly  Lymphatic:   Extremities: no edema, no clubbing  Skin:No rashes or nodules noted.   Neurologic:    LABS:  Labs Reviewed   CBC WITH AUTO DIFFERENTIAL - Abnormal; Notable for the following components:       Result Value    Hematocrit 35.1 (*)     RDW 16.4 (*)     All other components within normal limits    Narrative:     Performed at:  [facility] [address], [place], [address] [employer]   Phone [phone]   COMPREHENSIVE METABOLIC PANEL - Abnormal; Notable for the following components:    Glucose 108 (*)     Total Bilirubin 1.8 (*)     AST 14 (*)     All other components within normal limits    Narrative:     Performed at:  [facility] [address], [place], [address] [employer]   Phone 21  - Abnormal; Notable for the following components:    Pro- (*)     All other components within normal limits    Narrative:     Performed at:  [facility] [address], [place], [address] [employer]   Phone [phone]   POCT GLUCOSE - Abnormal; Notable for the following components:    POC Glucose 111 (*)     All other components within normal limits    Narrative:     Performed at:  [facility] [address], [place], [address] [employer]   Phone [phone]   POCT GLUCOSE - Abnormal; Notable for the following components:    POC Glucose 110 (*)     All other on O2 if currently on. All monitors reattached when patient returns to room. Has a \"code stroke\" or \"stroke alert\" been called? ->No Reason for exam:->unable to offer history Reason for Exam: Arm Pain (Pt to Er with c/o pain to right side under arm pit since yesterday, denies injury. family states concerned for stroke, states hx, previous right side deficit. pt denies weakness, states \"its just the pain\") Acuity: Acute Type of Exam: Initial Mechanism of Injury: fell 1 week ago FINDINGS: BRAIN/VENTRICLES: There is no acute intracranial hemorrhage, mass effect or midline shift. No abnormal extra-axial fluid collection. The gray-white differentiation is maintained without evidence of an acute infarct. There is no evidence of hydrocephalus. Scattered periventricular low attenuation which is similar to previous exam and likely related to chronic small vessel disease. Diffuse volume loss. ORBITS: The visualized portion of the orbits demonstrate no acute abnormality. SINUSES: The visualized paranasal sinuses and mastoid air cells demonstrate no acute abnormality. SOFT TISSUES/SKULL:  No acute abnormality of the visualized skull or soft tissues. No acute intracranial abnormality. No significant change in appearance of the head. Critical results were called by Dr. Lamonte Dawn MD to [facility] on 7/11/2020 at 13:19. Xr Chest Portable    Result Date: 7/11/2020  EXAMINATION: ONE XRAY VIEW OF THE CHEST 7/11/2020 12:41 pm COMPARISON: June 8, 2020 HISTORY: ORDERING SYSTEM PROVIDED HISTORY: SOB TECHNOLOGIST PROVIDED HISTORY: Reason for exam:->SOB Reason for Exam: c/o pain to right side under arm pit since yesterday, denies injury. family states concerned for stroke, states hx, previous right side deficit. pt denies weakness, states \"its just the pain\") Acuity: Acute Type of Exam: Initial FINDINGS: Cardiac silhouette is enlarged. No pneumothorax. No pleural effusion. No consolidation.   No acute bony being supplied through the anterior communicating artery. POSTERIOR CIRCULATION: No significant stenosis of the vertebral, basilar, or posterior cerebral arteries. No aneurysm. There is a fetal configuration left PCA. OTHER: No dural venous sinus thrombosis on this non-dedicated study. BRAIN: No mass effect or midline shift. No extra-axial fluid collection. The gray-white differentiation is maintained. No significant stenosis or occlusion in the cervical or intracranial vasculature. EKG: from ER, interpreted by self, NSR at 96. No acute ST elevation, no TWI noted. Comparison made to ekg in old chart dated 6/8/20, shows similar form, rate a little lwoer at [address] MAKING:    Principal Problem:    TIA (transient ischemic attack) -New Problem to me. Pt with some sx suggestive of TIA - though I am not familiar with pt prior to today, so unclear how much of her presentation is baseline from prev stroke  Plan: admit, MRI ordered. PT/OT. Ask neurology to see. On ASA  Active Problems:    Expressive aphasia -Established problem. Stable. Plan: Continue present orders/plan. Essential hypertension -Established problem. Stable. 133/72  Plan: Pt home BP meds reviewed and will be continued. IV Hydralazine ordered for control of extremely high blood pressures   Will monitor labs to assess Creat/K for possible complications of medications. Ataxia -Established problem. Stable. Plan: pt/ot to see    Suspected COVID-19 virus infection -New Problem to me. Pt's family with some concern for poss covid infection. She does not appear symptomatic, and it is unclear what risk factors she has  Plan: check indirect markers, will d/w other providers to see if they feel swab to be done. Place in isolation until decision made          Diagnoses as listed above, designated as new or established and plan outlined for each. Data Reviewed:   (1) Lab tests were reviewed or ordered.    (1) Radiology tests were reviewed or ordered. (1) Medical test (Echo, EKG, PFT/dianne) were ordered. (1)History was obtained from someone other than patient  (1) Old records  were reviewed - see HPI/MDM for pertinent details if review done. (2) Case wasdiscussed with another health care provider: Shakira Drivers er NP  (2) Imaging was viewed by myself. (2) EKG  was viewed by myself. The patient isbeing placed in inpatient status with the expectation of requiring a hospital stay spanning at least two midnights for care and treatment of the problems noted in the problem list.  This determination is also based on thepatients comorbidities and past medical history, the severity and timing of the signs and symptoms upon presentation.         Electronically signed by: Tucker Hoover 7/11/2020

## 2020-07-12 NOTE — PROGRESS NOTES
Progress Note - Dr. Horace Prasad - Internal Medicine  PCP: MD Carli [address] / [place] [phone]    Hospital Day: 1  Code Status: Full Code  Current Diet: DIET GENERAL;        CC: follow up on medical issues    Subjective: Sly North is a 80 y.o. female. She denies problems    Doing ok  No evidence of coronavirus infection, but indirect markers elevated  At this time, will need repeat swab in my opinion    No new neuro sx      She denies chest pain, denies shortness of breath, denies nausea,  denies emesis. 10 system Review of Systems is reviewed with patient, and pertinent positives are listed here: None . Otherwise, Review of systems is negative. I have reviewed the patient's medical and social history in detail and updated the computerized patient record. To recap: She  has a past medical history of Arthritis, CAD (coronary artery disease), GERD (gastroesophageal reflux disease), Hx of blood clots, Hyperlipidemia, and Hypertension. . She  has a past surgical history that includes Cholecystectomy; Appendectomy; Varicose vein surgery; Endoscopy, colon, diagnostic; eye surgery; joint replacement; and Breast surgery. . She  reports that she has never smoked. She has never used smokeless tobacco. She reports that she does not drink alcohol or use drugs. .        Active Hospital Problems    Diagnosis Date Noted    HTN (hypertension), benign [I10]     Dementia due to Alzheimer's disease ([facility] [address] [G30.9, F02.80]     Dyslipidemia [E78.5]     TIA (transient ischemic attack) [G45.9] 07/11/2020    Suspected COVID-19 virus infection [Z20.828] 07/11/2020    Expressive aphasia [R47.01] 04/18/2020    Essential hypertension [I10] 04/18/2020    Ataxia [R27.0] 04/18/2020       Current Facility-Administered Medications: amLODIPine (NORVASC) tablet 10 mg, 10 mg, Oral, Daily  atorvastatin (LIPITOR) tablet 40 mg, 40 mg, Oral, Nightly  calcium elemental (OSCAL) tablet 500 mg, 500 mg, Oral, Daily  clopidogrel (PLAVIX) tablet 75 mg, 75 mg, Oral, Daily  escitalopram (LEXAPRO) tablet 5 mg, 5 mg, Oral, Daily  losartan (COZAAR) tablet 100 mg, 100 mg, Oral, Daily  therapeutic multivitamin-minerals 1 tablet, 1 tablet, Oral, Daily  potassium chloride (KLOR-CON M) extended release tablet 20 mEq, 20 mEq, Oral, TID WC  sodium chloride flush 0.9 % injection 10 mL, 10 mL, Intravenous, 2 times per day  sodium chloride flush 0.9 % injection 10 mL, 10 mL, Intravenous, PRN  magnesium hydroxide (MILK OF MAGNESIA) 400 MG/5ML suspension 30 mL, 30 mL, Oral, Daily PRN  promethazine (PHENERGAN) tablet 12.5 mg, 12.5 mg, Oral, Q6H PRN **OR** ondansetron (ZOFRAN) injection 4 mg, 4 mg, Intravenous, Q6H PRN  enoxaparin (LOVENOX) injection 40 mg, 40 mg, Subcutaneous, Daily  aspirin EC tablet 81 mg, 81 mg, Oral, Daily **OR** aspirin suppository 300 mg, 300 mg, Rectal, Daily  labetalol (NORMODYNE;TRANDATE) injection 10 mg, 10 mg, Intravenous, Q10 Min PRN  famotidine (PEPCID) tablet 20 mg, 20 mg, Oral, Daily  hydrALAZINE (APRESOLINE) injection 10 mg, 10 mg, Intravenous, Q6H PRN  0.9 % sodium chloride bolus, 500 mL, Intravenous, PRN  potassium chloride (KLOR-CON M) extended release tablet 40 mEq, 40 mEq, Oral, PRN **OR** potassium bicarb-citric acid (EFFER-K) effervescent tablet 40 mEq, 40 mEq, Oral, PRN **OR** potassium chloride 10 mEq/100 mL IVPB (Peripheral Line), 10 mEq, Intravenous, PRN         Objective:  BP (!) 109/56   Pulse 99   Temp 97.5 °F (36.4 °C) (Oral)   Resp 20   Ht 5' 3\" (1.6 m)   Wt 135 lb (61.2 kg)   SpO2 97%   BMI 23.91 kg/m²      Patient Vitals for the past 24 hrs:   BP Temp Temp src Pulse Resp SpO2 Height Weight   07/12/20 0823 (!) 109/56 97.5 °F (36.4 °C) Oral 99 20 97 % -- --   07/12/20 0339 98/62 97.6 °F (36.4 °C) Oral 86 18 94 % -- --   07/12/20 0145 105/64 98.1 °F (36.7 °C) Oral 93 18 97 % -- --   07/11/20 2145 133/72 97.3 °F (36.3 °C) Oral 85 18 97 % -- --   07/11/20 2130 -- -- -- -- -- 96 % -- -- 07/11/20 2115 115/62 -- -- 84 19 -- -- --   07/11/20 2100 (!) 107/58 -- -- 82 18 92 % -- --   07/11/20 2045 109/60 -- -- 84 18 -- -- --   07/11/20 2030 (!) 122/59 -- -- 84 17 -- -- --   07/11/20 2015 113/62 -- -- 84 16 93 % -- --   07/11/20 2000 118/63 -- -- 83 19 93 % -- --   07/11/20 1947 130/64 -- -- 84 16 94 % -- --   07/11/20 1930 125/88 -- -- 87 20 -- -- --   07/11/20 1915 131/60 -- -- 83 19 -- -- --   07/11/20 1900 (!) 107/51 -- -- 83 18 -- -- --   07/11/20 1845 (!) 111/45 -- -- 84 17 98 % -- --   07/11/20 1830 (!) 109/51 -- -- 83 17 -- -- --   07/11/20 1815 (!) 129/57 -- -- 84 23 93 % -- --   07/11/20 1800 124/61 -- -- 86 20 97 % -- --   07/11/20 1745 128/65 -- -- 87 18 96 % -- --   07/11/20 1730 (!) 117/52 -- -- 82 20 96 % -- --   07/11/20 1715 (!) 144/60 -- -- 90 18 98 % -- --   07/11/20 1700 138/62 -- -- 81 21 95 % -- --   07/11/20 1645 (!) 116/56 -- -- 81 20 95 % -- --   07/11/20 1630 (!) 110/53 -- -- 82 20 94 % -- --   07/11/20 1615 (!) 110/54 -- -- 84 21 95 % -- --   07/11/20 1600 (!) 115/53 -- -- 85 21 93 % -- --   07/11/20 1545 (!) 118/53 -- -- 87 22 95 % -- --   07/11/20 1530 (!) 135/59 -- -- 90 22 -- -- --   07/11/20 1515 (!) 119/57 -- -- 88 22 95 % -- --   07/11/20 1500 133/65 -- -- 89 22 92 % -- --   07/11/20 1445 (!) 121/53 -- -- 90 23 -- -- --   07/11/20 1430 (!) 130/57 -- -- 93 22 -- -- --   07/11/20 1415 (!) 130/58 -- -- 92 23 92 % -- --   07/11/20 1400 (!) 134/50 -- -- 93 19 98 % -- --   07/11/20 1330 131/62 -- -- 97 16 98 % -- --   07/11/20 1246 (!) 149/60 -- -- 98 16 95 % -- --   07/11/20 1209 (!) 150/79 98.4 °F (36.9 °C) Oral 102 18 94 % 5' 3\" (1.6 m) 135 lb (61.2 kg)     Patient Vitals for the past 96 hrs (Last 3 readings):   Weight   07/11/20 1209 135 lb (61.2 kg)           Intake/Output Summary (Last 24 hours) at 7/12/2020 1105  Last data filed at 7/12/2020 0900  Gross per 24 hour   Intake 10 ml   Output --   Net 10 ml         Physical Exam:   S1, S2 normal, no murmur, rub or gallop, cardiac monitor and/or pulse ox, they may be taken off cardiac monitor and pulse ox, left on O2 if currently on. All monitors reattached when patient returns to room. Has a \"code stroke\" or \"stroke alert\" been called? ->No Reason for exam:->unable to offer history Reason for Exam: Arm Pain (Pt to Er with c/o pain to right side under arm pit since yesterday, denies injury. family states concerned for stroke, states hx, previous right side deficit. pt denies weakness, states \"its just the pain\") Acuity: Acute Type of Exam: Initial Mechanism of Injury: fell 1 week ago FINDINGS: BRAIN/VENTRICLES: There is no acute intracranial hemorrhage, mass effect or midline shift. No abnormal extra-axial fluid collection. The gray-white differentiation is maintained without evidence of an acute infarct. There is no evidence of hydrocephalus. Scattered periventricular low attenuation which is similar to previous exam and likely related to chronic small vessel disease. Diffuse volume loss. ORBITS: The visualized portion of the orbits demonstrate no acute abnormality. SINUSES: The visualized paranasal sinuses and mastoid air cells demonstrate no acute abnormality. SOFT TISSUES/SKULL:  No acute abnormality of the visualized skull or soft tissues. No acute intracranial abnormality. No significant change in appearance of the head. Critical results were called by Dr. Monico Treadwell MD to [facility] on 7/11/2020 at 13:19. Xr Chest Portable    Result Date: 7/11/2020  EXAMINATION: ONE XRAY VIEW OF THE CHEST 7/11/2020 12:41 pm COMPARISON: June 8, 2020 HISTORY: ORDERING SYSTEM PROVIDED HISTORY: SOB TECHNOLOGIST PROVIDED HISTORY: Reason for exam:->SOB Reason for Exam: c/o pain to right side under arm pit since yesterday, denies injury. family states concerned for stroke, states hx, previous right side deficit.  pt denies weakness, states \"its just the pain\") Acuity: Acute Type of Exam: Initial FINDINGS: Cardiac silhouette is enlarged. No pneumothorax. No pleural effusion. No consolidation. No acute bony abnormality. No acute findings     Cta Head Neck W Contrast    Addendum Date: 7/12/2020    ADDENDUM: 3D reformat images are now available for review. No aneurysm is detected. 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 Result Date: 7/12/2020  EXAMINATION: CTA OF THE HEAD AND NECK WITH CONTRAST 7/11/2020 1:19 pm: TECHNIQUE: CTA of the head and neck was performed with the administration of intravenous contrast. Multiplanar reformatted images are provided for review. MIP images are provided for review. Stenosis of the internal carotid arteries measured using NASCET criteria. Dose modulation, iterative reconstruction, and/or weight based adjustment of the mA/kV was utilized to reduce the radiation dose to as low as reasonably achievable. COMPARISON: CT head performed earlier the same day HISTORY: ORDERING SYSTEM PROVIDED HISTORY: stroke, right sided weakness TECHNOLOGIST PROVIDED HISTORY: Reason for exam:->stroke, right sided weakness Reason for Exam: Arm Pain (Pt to Er with c/o pain to right side under arm pit since yesterday, denies injury. family states concerned for stroke, states hx, previous right side deficit. pt denies weakness, states \"its just the pain\") Acuity: Acute Type of Exam: Initial Mechanism of Injury: fell 1 week ago on right side FINDINGS: CTA NECK: AORTIC ARCH/ARCH VESSELS: No dissection or arterial injury. No significant stenosis of the brachiocephalic or subclavian arteries. CAROTID ARTERIES: No dissection, arterial injury, or hemodynamically significant stenosis by NASCET criteria. VERTEBRAL ARTERIES: No dissection, arterial injury, or significant stenosis. Right vertebral artery is dominant SOFT TISSUES: The lung apices are clear. No cervical or superior mediastinal lymphadenopathy. The larynx and pharynx are unremarkable. No acute abnormality of the salivary and thyroid glands.  BONES: No acute osseous abnormality. CTA HEAD: ANTERIOR CIRCULATION: No significant stenosis of the intracranial internal carotid, anterior cerebral, or middle cerebral arteries. No aneurysm. 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 POSTERIOR CIRCULATION: No significant stenosis of the vertebral, basilar, or posterior cerebral arteries. No aneurysm. There is a fetal configuration left PCA. OTHER: No dural venous sinus thrombosis on this non-dedicated study. BRAIN: No mass effect or midline shift. No extra-axial fluid collection. The gray-white differentiation is maintained. No significant stenosis or occlusion in the cervical or intracranial vasculature. Assessment and Plan:  Principal Problem:    TIA (transient ischemic attack) -Established problem. Improving. No sx at this time   Plan: mri ordered, but await neuro eval. MRI may need to wait until COVID swab back, or possibly be deferred to outpt eval if pt continues to be asymptomatic  Active Problems:    Expressive aphasia     Essential hypertension -Established problem. Stable. 109/56  Plan: Pt home BP meds reviewed and will be continued. IV Hydralazine ordered for control of extremely high blood pressures   Will monitor labs to assess Creat/K for possible complications of medications. Ataxia -Established problem. Stable. .  Plan: Continue present orders/plan. Suspected COVID-19 virus infection -Established problem. Stable. covid swab ordered  Plan: place in isolation. Dementia due to Alzheimer's disease ([facility] [address] -Established problem. Stable. Plan: Continue present orders/plan. Dyslipidemia  Plan: Continue present orders/plan.              Kiki Macias  7/12/2020

## 2020-07-12 NOTE — ED NOTES
Report given to [facility]. V/u ad margaret questions answered.       Angel Sanchez RN  07/11/20 9527

## 2020-07-12 NOTE — PROGRESS NOTES
Pt in Covid rule out. VSS. Denies any pain. Educated on isolation. NIHSS 3. A & O x 4. Has had some confusion. Has expressive aphagia from previous stroke. Call light within reach. Bed locked in lowest position.

## 2020-07-12 NOTE — CONSULTS
In patient Neurology consult        [facility] Neurology      MD Hanna De La Garza  1934    Date of Service: 7/12/2020    Referring Physician: Jennifer Serrano MD    Most of the history was obtained from detailed chart reviewing. The patient is currently confused and unable to provide me with accurate history. Reason for the consult and CC: Acute encephalopathy    HPI:   The patient is a 80y.o.  years old female with history of dementia, hypertension and hyperlipidemia who was admitted to the hospital yesterday from the ER with acute confusion, generalized weakness and encephalopathy. Symptoms started few days ago. According to report, she has been having trouble with more bladder frequency followed by more confusion and generalized weakness. Degree was moderate and persistent over the last few days. No falling or injury. No other triggers or other associated symptoms. No recent fever. No recent LOC with head trauma. No relieving or aggravating factors. Her therapist found her that she is somewhat more weaker than normal.  She was unable to use her normal walker. She came to the ED for evaluation. Initial work-up with blood testing revealed sodium 139 potassium 3.8. White count was 4.9. Initial CT of the head showed diffuse atrophy with no acute changes. CTA showed no large vessel occlusion. She was admitted for medical management. Today she is awake and alert. She denies any headache, neck pain, chest pain, dysphagia or dysarthria or visual changes. She was unable to give any more history. Other review of system was limited from the patient    History reviewed. No pertinent family history.   Noncontributory      Past Medical History:   Diagnosis Date    Arthritis     CAD (coronary artery disease)     GERD (gastroesophageal reflux disease)     Hx of blood clots     phlebitis    Hyperlipidemia     Hypertension      Past Surgical History:   Procedure Laterality Date  APPENDECTOMY      BREAST SURGERY      cyst removal    CHOLECYSTECTOMY      ENDOSCOPY, COLON, DIAGNOSTIC      egd w/ dilitation    EYE SURGERY      cataract, bilat    JOINT REPLACEMENT      right shoulder    VARICOSE VEIN SURGERY       Social History     Tobacco Use    Smoking status: Never Smoker    Smokeless tobacco: Never Used   Substance Use Topics    Alcohol use: No    Drug use: No     Allergies   Allergen Reactions    Amoxicillin Hives    Bupivacaine Hcl Shortness Of Breath     Had a reaction after an injection of omnitaque, lidocaine, sensorcaine and kenalog. She isn't sure what caused her problems.  Lidocaine Shortness Of Breath, Other (See Comments), Nausea Only and Swelling     Received this during her RODRICK at same time as Kenalog so unknown what she reacted to. Had a reaction after an injection of omnitaque, lidocaine, sensorcaine and kenalog. She isn't sure what caused her problems.  Iodides     Ketamine     Lisinopril     Sulfa Antibiotics Nausea Only     gastritis      Triamcinolone Swelling    Iohexol Nausea And Vomiting and Swelling     Had back injection and had reaction. The other drugs included with injection are Lidocaine, sensorcaine, and kenalog.        Current Facility-Administered Medications   Medication Dose Route Frequency Provider Last Rate Last Dose    amLODIPine (NORVASC) tablet 10 mg  10 mg Oral Daily Jo Peterson MD   10 mg at 07/12/20 0900    atorvastatin (LIPITOR) tablet 40 mg  40 mg Oral Nightly Jo Peterson MD        calcium elemental (OSCAL) tablet 500 mg  500 mg Oral Daily Jo Peterson MD   500 mg at 07/12/20 0901    clopidogrel (PLAVIX) tablet 75 mg  75 mg Oral Daily Jo Peterson MD   75 mg at 07/12/20 0901    escitalopram (LEXAPRO) tablet 5 mg  5 mg Oral Daily Jo Peterson MD   5 mg at 07/12/20 0901    losartan (COZAAR) tablet 100 mg  100 mg Oral Daily Jo Peterson MD   100 mg at 07/12/20 0900    therapeutic multivitamin-minerals 1 tablet  1 tablet Oral Daily Coni Hatchet, MD   1 tablet at 07/12/20 0901    potassium chloride (KLOR-CON M) extended release tablet 20 mEq  20 mEq Oral TID WC Coni Hatchet, MD   20 mEq at 07/12/20 0900    sodium chloride flush 0.9 % injection 10 mL  10 mL Intravenous 2 times per day Coni Hatchet, MD   10 mL at 07/12/20 0901    sodium chloride flush 0.9 % injection 10 mL  10 mL Intravenous PRN Coni Hatchet, MD        magnesium hydroxide (MILK OF MAGNESIA) 400 MG/5ML suspension 30 mL  30 mL Oral Daily PRN Coni Hatchet, MD        promethazine (PHENERGAN) tablet 12.5 mg  12.5 mg Oral Q6H PRN Coni Hatchet, MD        Or    ondansetron [facility] injection 4 mg  4 mg Intravenous Q6H PRN Coni Hatchet, MD        enoxaparin (LOVENOX) injection 40 mg  40 mg Subcutaneous Daily Coni Hatchet, MD   40 mg at 07/12/20 0900    aspirin EC tablet 81 mg  81 mg Oral Daily Coni Hatchet, MD   81 mg at 07/12/20 0901    Or    aspirin suppository 300 mg  300 mg Rectal Daily Coni Hatchet, MD        labetalol (NORMODYNE;TRANDATE) injection 10 mg  10 mg Intravenous Q10 Min PRN Coni Hatchet, MD        famotidine (PEPCID) tablet 20 mg  20 mg Oral Daily Coni Hatchet, MD   20 mg at 07/12/20 0901    hydrALAZINE (APRESOLINE) injection 10 mg  10 mg Intravenous Q6H PRN Coni Hatchet, MD        0.9 % sodium chloride bolus  500 mL Intravenous PRN Coni Hatchet, MD        potassium chloride (KLOR-CON M) extended release tablet 40 mEq  40 mEq Oral PRN Coni Hatchet, MD        Or    potassium bicarb-citric acid (EFFER-K) effervescent tablet 40 mEq  40 mEq Oral PRN Coni Hatchet, MD        Or    potassium chloride 10 mEq/100 mL IVPB (Peripheral Line)  10 mEq Intravenous PRN Coni Hatchet, MD           ROS: 10-14 system review was limited due to MS changes or as per HPI.      Constitutional:   Vitals:    07/11/20 2145 07/12/20 0145 07/12/20 0339 07/12/20 0823   BP: 133/72 105/64 98/62 (!) 109/56   Pulse: 85 93 86 99   Resp: 18 18 18 20   Temp: 97.3 °F (36.3 °C) 98.1 °F (36.7 °C) 97.6 °F (36.4 °C) 97.5 °F (36.4 °C)   TempSrc: Oral Oral Oral Oral   SpO2: 97% 97% 94% 97%   Weight:       Height:           General appearance: No acute distress  Eye: PRRR  Fundus: Funduscopic examination could not be performed due to patient's poor cooperation and COVID-19 restriction. Neck: supple  Cardiovascular: No lower leg edema with good pulsation. Mental Status:   AAO times one, only to herself  Good attention and concentration. Easily distracted  Poor recent and remote memory  Language: Fluent but with poor comprehension and not following directions  Poor fund of knowledge  Cranial Nerves:   II: Visual fields: NT due to confusion. Pupils: equal, round, reactive to light  III,IV,VI: Extra Ocular Movements are intact. No nystagmus  V: Facial sensation : Normal  VII: Facial strength and movements: intact and symmetric  VIII: Hearing: NT due to confusion  IX: Palate elevation symmetric  XI: Shoulder shrug: Symmetric  XII: Tongue movements: Midline  Musculoskeletal:  The patient can move all 4 extremities. No apparent focal weakness. Tone: Normal tone. No rigidity. Reflexes: Bilateral biceps 2/4, triceps 2/4, brachial radialis 2/4, knee 2/4 and ankle 2/4. Planters: flexor bilaterally.   Coordination: No tremors or dysmetria  Sensation: Grossly normal   gait/Posture: NT due to confusion    Data:  LABS:   Lab Results   Component Value Date     07/12/2020    K 3.8 07/12/2020     07/12/2020    CO2 24 07/12/2020    BUN 20 07/12/2020    CREATININE 0.7 07/12/2020    GFRAA >60 07/12/2020    LABGLOM >60 07/12/2020    GLUCOSE 152 07/12/2020    MG 1.80 06/08/2020    CALCIUM 9.5 07/12/2020     Lab Results   Component Value Date    WBC 4.9 07/12/2020    RBC 4.21 07/12/2020    HGB 11.9 07/12/2020    HCT 35.0 07/12/2020    MCV 83.1 07/12/2020    RDW 16.6 07/12/2020     07/12/2020     Lab Results

## 2020-07-12 NOTE — PROGRESS NOTES
Spoke with MD this AM about Covid testing. He stated that he wanted her to be tested and be in precaution until the results are back.

## 2020-07-13 LAB
ANION GAP SERPL CALCULATED.3IONS-SCNC: 11 MMOL/L (ref 3–16)
BASOPHILS ABSOLUTE: 0 K/UL (ref 0–0.2)
BASOPHILS RELATIVE PERCENT: 0.2 %
BUN BLDV-MCNC: 30 MG/DL (ref 7–20)
C-REACTIVE PROTEIN: 85.9 MG/L (ref 0–5.1)
CALCIUM SERPL-MCNC: 9.6 MG/DL (ref 8.3–10.6)
CHLORIDE BLD-SCNC: 102 MMOL/L (ref 99–110)
CO2: 23 MMOL/L (ref 21–32)
CREAT SERPL-MCNC: 0.9 MG/DL (ref 0.6–1.2)
D DIMER: 1008 NG/ML DDU (ref 0–229)
EOSINOPHILS ABSOLUTE: 0 K/UL (ref 0–0.6)
EOSINOPHILS RELATIVE PERCENT: 0.3 %
ESTIMATED AVERAGE GLUCOSE: 102.5 MG/DL
FERRITIN: 320.8 NG/ML (ref 15–150)
FIBRINOGEN: 603 MG/DL (ref 200–397)
GFR AFRICAN AMERICAN: >60
GFR NON-AFRICAN AMERICAN: 59
GLUCOSE BLD-MCNC: 99 MG/DL (ref 70–99)
HBA1C MFR BLD: 5.2 %
HCT VFR BLD CALC: 32.4 % (ref 36–48)
HEMOGLOBIN: 10.8 G/DL (ref 12–16)
LYMPHOCYTES ABSOLUTE: 1.6 K/UL (ref 1–5.1)
LYMPHOCYTES RELATIVE PERCENT: 20.8 %
MCH RBC QN AUTO: 27.7 PG (ref 26–34)
MCHC RBC AUTO-ENTMCNC: 33.4 G/DL (ref 31–36)
MCV RBC AUTO: 82.7 FL (ref 80–100)
MONOCYTES ABSOLUTE: 0.6 K/UL (ref 0–1.3)
MONOCYTES RELATIVE PERCENT: 7.6 %
NEUTROPHILS ABSOLUTE: 5.6 K/UL (ref 1.7–7.7)
NEUTROPHILS RELATIVE PERCENT: 71.1 %
PDW BLD-RTO: 16.5 % (ref 12.4–15.4)
PLATELET # BLD: 334 K/UL (ref 135–450)
PMV BLD AUTO: 8.4 FL (ref 5–10.5)
POTASSIUM SERPL-SCNC: 4.3 MMOL/L (ref 3.5–5.1)
RBC # BLD: 3.92 M/UL (ref 4–5.2)
SARS-COV-2, PCR: NOT DETECTED
SODIUM BLD-SCNC: 136 MMOL/L (ref 136–145)
WBC # BLD: 7.9 K/UL (ref 4–11)

## 2020-07-13 PROCEDURE — G0378 HOSPITAL OBSERVATION PER HR: HCPCS

## 2020-07-13 PROCEDURE — 96372 THER/PROPH/DIAG INJ SC/IM: CPT

## 2020-07-13 PROCEDURE — 82728 ASSAY OF FERRITIN: CPT

## 2020-07-13 PROCEDURE — 6370000000 HC RX 637 (ALT 250 FOR IP): Performed by: INTERNAL MEDICINE

## 2020-07-13 PROCEDURE — 86140 C-REACTIVE PROTEIN: CPT

## 2020-07-13 PROCEDURE — 85025 COMPLETE CBC W/AUTO DIFF WBC: CPT

## 2020-07-13 PROCEDURE — 85384 FIBRINOGEN ACTIVITY: CPT

## 2020-07-13 PROCEDURE — 36415 COLL VENOUS BLD VENIPUNCTURE: CPT

## 2020-07-13 PROCEDURE — 1200000000 HC SEMI PRIVATE

## 2020-07-13 PROCEDURE — 2580000003 HC RX 258: Performed by: INTERNAL MEDICINE

## 2020-07-13 PROCEDURE — 85379 FIBRIN DEGRADATION QUANT: CPT

## 2020-07-13 PROCEDURE — 80048 BASIC METABOLIC PNL TOTAL CA: CPT

## 2020-07-13 PROCEDURE — 6360000002 HC RX W HCPCS: Performed by: INTERNAL MEDICINE

## 2020-07-13 PROCEDURE — 99233 SBSQ HOSP IP/OBS HIGH 50: CPT | Performed by: PSYCHIATRY & NEUROLOGY

## 2020-07-13 RX ADMIN — CLOPIDOGREL 75 MG: 75 TABLET, FILM COATED ORAL at 08:57

## 2020-07-13 RX ADMIN — FAMOTIDINE 20 MG: 20 TABLET, FILM COATED ORAL at 08:57

## 2020-07-13 RX ADMIN — ASPIRIN 81 MG: 81 TABLET, COATED ORAL at 08:57

## 2020-07-13 RX ADMIN — POTASSIUM CHLORIDE 20 MEQ: 1500 TABLET, EXTENDED RELEASE ORAL at 08:57

## 2020-07-13 RX ADMIN — Medication 10 ML: at 22:41

## 2020-07-13 RX ADMIN — AMLODIPINE BESYLATE 10 MG: 5 TABLET ORAL at 08:57

## 2020-07-13 RX ADMIN — DESMOPRESSIN ACETATE 40 MG: 0.2 TABLET ORAL at 22:40

## 2020-07-13 RX ADMIN — CALCIUM 500 MG: 500 TABLET ORAL at 08:57

## 2020-07-13 RX ADMIN — ESCITALOPRAM OXALATE 5 MG: 10 TABLET ORAL at 08:57

## 2020-07-13 RX ADMIN — MULTIPLE VITAMINS W/ MINERALS TAB 1 TABLET: TAB at 08:57

## 2020-07-13 RX ADMIN — POTASSIUM CHLORIDE 20 MEQ: 1500 TABLET, EXTENDED RELEASE ORAL at 11:56

## 2020-07-13 RX ADMIN — Medication 10 ML: at 08:59

## 2020-07-13 RX ADMIN — ENOXAPARIN SODIUM 40 MG: 40 INJECTION SUBCUTANEOUS at 08:58

## 2020-07-13 RX ADMIN — LOSARTAN POTASSIUM 100 MG: 100 TABLET, FILM COATED ORAL at 08:57

## 2020-07-13 ASSESSMENT — PAIN SCALES - GENERAL
PAINLEVEL_OUTOF10: 0

## 2020-07-13 NOTE — PLAN OF CARE
Problem: Falls - Risk of:  Goal: Will remain free from falls  Description: Will remain free from falls  Outcome: Ongoing  Note: All fall precautions in place, bed in lowest position, frequent rounding to assist with needs. Pt impulsive an d confused. Bed alarm engaged. Pt absent of fall this shift. Problem: HEMODYNAMIC STATUS  Goal: Patient has stable vital signs and fluid balance  Outcome: Ongoing  Note: Pt VSS. Pt not eating at this time because she is angry. Continuing to encourage patient to eat and drink. Will continue to monitor. Problem: ACTIVITY INTOLERANCE/IMPAIRED MOBILITY  Goal: Mobility/activity is maintained at optimum level for patient  Outcome: Ongoing  Note: Pt is ambulatory but is SBA. She is not happy about it and continues to get up at will. Pt is only slightly unsteady. Problem: COMMUNICATION IMPAIRMENT  Goal: Ability to express needs and understand communication  Outcome: Ongoing  Note: Pt can express her needs however d/t her dementia she is unable to understand or maintain information given to her. She is repeatedly told why she is here and why she has to stay. Problem: Skin Integrity:  Goal: Will show no infection signs and symptoms  Description: Will show no infection signs and symptoms  Outcome: Ongoing     Problem: Skin Integrity:  Goal: Absence of new skin breakdown  Description: Absence of new skin breakdown  Outcome: Ongoing  Note: No new skin breakdown or new reddened bony prominances this shift. Pt repositions self. Will keep skin clean and dry, encourage repositioning  and provide adequate hydration.

## 2020-07-13 NOTE — PROGRESS NOTES
Assessment/v itals complete see flowsheets. NIHSS 1. Plan of care discussed, pt verbalized understanding. Denies any needs at this time, callbell in reach, bed alarm on.   Sarah Cerna RN

## 2020-07-13 NOTE — PLAN OF CARE
Problem: Falls - Risk of:  Goal: Will remain free from falls  Description: Will remain free from falls  7/13/2020 0136 by Stuart Alvarez RN  Outcome: Ongoing     Problem: Falls - Risk of:  Goal: Absence of physical injury  Description: Absence of physical injury  Outcome: Ongoing  Note: HIGH fall risk. Pt is aware of needs at times, hx alzheimers, SBa with walker. SAFE to door, armband in place, sierails up x2, bed in lowest position, wheels locked and bed alarm oh. Callbell in reach.      Problem: HEMODYNAMIC STATUS  Goal: Patient has stable vital signs and fluid balance  7/13/2020 0136 by Stuart Alvarez RN  Outcome: Ongoing     Problem: ACTIVITY INTOLERANCE/IMPAIRED MOBILITY  Goal: Mobility/activity is maintained at optimum level for patient  7/13/2020 0136 by Stuart Alvarez RN  Outcome: Ongoing     Problem: COMMUNICATION IMPAIRMENT  Goal: Ability to express needs and understand communication  7/13/2020 0136 by Stuart Alvarez RN  Outcome: Ongoing  Note: No difficulty with expressing needs or following commands

## 2020-07-13 NOTE — PROGRESS NOTES
NEUROLOGY FOLLOWUP    HISTORY OF PRESENT ILLNESS :   This is my first encounter with this patient. Patient was seen by Dr. Triston Hidalgo yesterday. The chart was reviewed in detail. Sindhu Rodriguez is a 80 y.o. female   History was obtained from the patient and dictations in the chart. Additional history was obtained from the patient's nurse. Patient was admitted with acute confusion and generalized weakness and mental status changes. Patient's nurse reports that symptoms may be slightly better but she still has on and off confusion. Patient is on droplet plus isolation and is awaiting COVID 19 test results. REVIEW OF SYSTEMS       Constitutional:  []   Chills   [x]  Fatigue   []  Fevers   [x]  Malaise   []  Weight loss     [] Denies all of the above    Respiratory:   []  Cough    []  Shortness of breath         [x] Denies all of the above     Cardiovascular:   []  Chest pain    []  Exertional chest pressure/discomfort           [] Palpitations    []  Syncope     [x] Denies all of the above    Past Medical History:   Diagnosis Date    Arthritis     CAD (coronary artery disease)     GERD (gastroesophageal reflux disease)     Hx of blood clots     phlebitis    Hyperlipidemia     Hypertension      History reviewed. No pertinent family history.   Social History     Socioeconomic History    Marital status:      Spouse name: None    Number of children: None    Years of education: None    Highest education level: None   Occupational History    None   Social Needs    Financial resource strain: None    Food insecurity     Worry: None     Inability: None    Transportation needs     Medical: None     Non-medical: None   Tobacco Use    Smoking status: Never Smoker    Smokeless tobacco: Never Used   Substance and Sexual Activity    Alcohol use: No    Drug use: No    Sexual activity: Yes     Partners: Male   Lifestyle  Physical activity     Days per week: None     Minutes per session: None    Stress: None   Relationships    Social connections     Talks on phone: None     Gets together: None     Attends Hoahaoism service: None     Active member of club or organization: None     Attends meetings of clubs or organizations: None     Relationship status: None    Intimate partner violence     Fear of current or ex partner: None     Emotionally abused: None     Physically abused: None     Forced sexual activity: None   Other Topics Concern    None   Social History Narrative    None        PHYSICAL EXAMINATION      /67   Pulse 82   Temp 97.8 °F (36.6 °C) (Oral)   Resp 16   Ht 5' 3\" (1.6 m)   Wt 129 lb 10.1 oz (58.8 kg)   SpO2 95%   BMI 22.96 kg/m²   This is a well-nourished patient in no acute distress  Awake and alert. Oriented x2. There is some confusion. Judgment was impaired. The speech is normal.  Some word finding difficulties are present. Pupils equal round reactive to light. Visual fields could not be tested. Extraocular movements are intact. Face symmetrical.  Tongue midline. Moves all 4 extremities well. Could not cooperate for detailed sensory or coordination testing. Gait slightly unsteady. No carotid bruit. No neck stiffness.     DATA :  LABS:  General Labs:    CBC:   Lab Results   Component Value Date    WBC 7.9 07/13/2020    RBC 3.92 07/13/2020    HGB 10.8 07/13/2020    HCT 32.4 07/13/2020    MCV 82.7 07/13/2020    MCH 27.7 07/13/2020    MCHC 33.4 07/13/2020    RDW 16.5 07/13/2020     07/13/2020    MPV 8.4 07/13/2020     BMP:    Lab Results   Component Value Date     07/13/2020    K 4.3 07/13/2020    K 3.8 07/12/2020     07/13/2020    CO2 23 07/13/2020    BUN 30 07/13/2020    LABALBU 3.4 07/12/2020    CREATININE 0.9 07/13/2020    CALCIUM 9.6 07/13/2020    GFRAA >60 07/13/2020    LABGLOM 59 07/13/2020    GLUCOSE 99 07/13/2020         IMPRESSION :  Acute mental status changes and encephalopathy, etiology not clear. Consider and rule out acute ischemic stroke. Metabolic work-up so far has been normal.  Fibrinogen and d-dimer C-reactive protein and ferritin levels were all elevated. COVID-19 infection needs to be considered and ruled out. The results are pending at this time. Patient Active Problem List   Diagnosis    Acute cerebral infarction ([address]    Monoparesis of leg ([address]    Expressive aphasia    Essential hypertension    Cerebrovascular disease    ASHD (arteriosclerotic heart disease)    Ataxia    Acute encephalopathy    Late effects of cerebral ischemic stroke    Altered mental status    TIA (transient ischemic attack)    Suspected COVID-19 virus infection    HTN (hypertension), benign    Dementia due to Alzheimer's disease ([address]    Dyslipidemia       RECOMMENDATIONS :  Discussed with patient and her nurse  Patient needs an MRI brain but this is pending till the COVID results are back  Await COVID 19 results and if it is negative then plan for MRI brain. High complexity patient. Please note a portion of this chart was generated using dragon dictation software. Although every effort was made to ensure the accuracy of this automated transcription, some errors in transcription may have occurred.          Celina Hughes M.D.

## 2020-07-13 NOTE — PROGRESS NOTES
[facility] care referral. Patient recently active with [facility]. Will follow.

## 2020-07-13 NOTE — DISCHARGE INSTR - COC
Continuity of Care Form    Patient Name: Paula Harris   :  1934  MRN:  [id number]    Admit date:  2020  Discharge date:  20    Code Status Order: Full Code   Advance Directives:   [address] [facility] Documentation     Date/Time Healthcare Directive Type of Healthcare Directive Copy in [address] Agent's Name Healthcare Agent's Phone Number    20 0000  Yes, patient has an advance directive for healthcare treatment  --  No, copy requested from family  --  --  --          Admitting Physician:  Alana Mojica MD  PCP: Wicho Patrick MD    Discharging Nurse: [phone]  [address] Unit/Room#: 2MA-5665/1716-99  Discharging Unit Phone Number: Whitney Tucker  Emergency Contact:   Extended Emergency Contact Information  Primary Emergency Contact: blaine ceballos  Home Phone: [phone]  Mobile Phone: [phone]  Relation: Child  Preferred language: English   needed?  No  Secondary Emergency Contact: Fernie Ceballos  Address: [address] [place] [address]  Home Phone: [phone]  Relation: Spouse    Past Surgical History:  Past Surgical History:   Procedure Laterality Date    APPENDECTOMY      BREAST SURGERY      cyst removal    CHOLECYSTECTOMY      ENDOSCOPY, COLON, DIAGNOSTIC      egd w/ dilitation    EYE SURGERY      cataract, bilat    JOINT REPLACEMENT      right shoulder    VARICOSE VEIN SURGERY         Immunization History:   Immunization History   Administered Date(s) Administered    Influenza, High Dose (Fluzone 65 yrs and older) 2018, 10/11/2019    Pneumococcal Conjugate 13-valent ([id number]) 2017    Pneumococcal Polysaccharide (Qljyoeztt20) 2019    Tdap (Boostrix, Adacel) 2018       Active Problems:  Patient Active Problem List   Diagnosis Code    Acute cerebral infarction ([facility] I63.9    Monoparesis of leg ([facility] G83.10    Expressive aphasia R47.01    Essential hypertension I10    Cerebrovascular disease I67.9    ASHD (arteriosclerotic heart disease) I25.10    Ataxia R27.0    Acute encephalopathy G93.40    Late effects of cerebral ischemic stroke I69.30    Altered mental status R41.82    TIA (transient ischemic attack) G45.9    Suspected COVID-19 virus infection Z20.828    HTN (hypertension), benign I10    Dementia due to Alzheimer's disease ([facility] G30.9, F02.80    Dyslipidemia E78.5       Isolation/Infection:   Isolation          Droplet Plus        Patient Infection Status     Infection Onset Added Last Indicated Last Indicated By Review Planned Expiration Resolved Resolved By    COVID-19 Rule Out 05/17/20 05/17/20 07/12/20 COVID-19 (Ordered)              Nurse Assessment:  Last Vital Signs: /67   Pulse 82   Temp 97.8 °F (36.6 °C) (Oral)   Resp 16   Ht 5' 3\" (1.6 m)   Wt 129 lb 10.1 oz (58.8 kg)   SpO2 95%   BMI 22.96 kg/m²     Last documented pain score (0-10 scale): Pain Level: 0  Last Weight:   Wt Readings from Last 1 Encounters:   07/13/20 129 lb 10.1 oz (58.8 kg)     Mental Status:  disoriented    IV Access:  - None    Nursing Mobility/ADLs:  Walking   Independent  Transfer  Independent  Bathing  Assisted  Dressing  Independent  Toileting  Independent  Feeding  Independent  Med Admin  Independent  Med Delivery   whole    Wound Care Documentation and Therapy:        Elimination:  Continence:   · Bowel: Yes and No  · Bladder: Yes  Urinary Catheter: None   Colostomy/Ileostomy/Ileal Conduit: No       Date of Last BM:       Intake/Output Summary (Last 24 hours) at 7/13/2020 1238  Last data filed at 7/13/2020 1203  Gross per 24 hour   Intake --   Output 300 ml   Net -300 ml     I/O last 3 completed shifts:   In: 10 [I.V.:10]  Out: -     Safety Concerns:     History of Falls (last 30 days) and At Risk for Falls    Impairments/Disabilities:      None    Nutrition Therapy:  Current Nutrition Therapy:   - Oral Diet:  General    Routes of Feeding: Oral  Liquids: No Restrictions  Daily Fluid Restriction: no  Last Modified Barium Swallow with Video (Video Swallowing Test): not done    Treatments at the Time of Hospital Discharge:   Respiratory Treatments: ***  Oxygen Therapy:  is not on home oxygen therapy. Ventilator:    - No ventilator support    Rehab Therapies:  Nursing,Physical Therapy and Occupational Therapy  Weight Bearing Status/Restrictions: No weight bearing restirctions  Other Medical Equipment (for information only, NOT a DME order):  walker  Other Treatments: ***    Patient's personal belongings (please select all that are sent with patient):  Glasses, Hearing Aides bilateral    RN SIGNATURE:  Electronically signed by Jenni Ndiaye RN on 7/14/20 at 7:10 PM EDT    CASE MANAGEMENT/SOCIAL WORK SECTION    Inpatient Status Date: ***    Readmission Risk Assessment Score:  Readmission Risk              Risk of Unplanned Readmission:        22           Discharging to Facility/ Agency   Name: Emelina Rahman will call for Appointment  Phone: [phone]  Fax: [phone]    Dialysis Facility (if applicable)   · Name:  · Address:  · Dialysis Schedule:  · Phone:  · Fax:    / signature: {Esignature:[id number]}    PHYSICIAN SECTION    Prognosis: Guarded    Condition at Discharge: Stable    Rehab Potential (if transferring to Rehab): Good    Recommended Labs or Other Treatments After Discharge:     Physician Certification: I certify the above information and transfer of Shruthi Hurt  is necessary for the continuing treatment of the diagnosis listed and that she requires Home Care for greater 30 days.      Update Admission H&P: No change in H&P    PHYSICIAN SIGNATURE:  Electronically signed by Yoel England MD on 7/14/20 at 5:48 PM EDT

## 2020-07-13 NOTE — CARE COORDINATION
Discharge Planning Assessment  RN/SW discharge planner met with patient/ (and family member) to discuss reason for admission, current living situation, and potential needs at the time of discharge    Demographics/Insurance verified Yes/No    Current type of dwelling: Remains on 1st floor    Living arrangements: spouse, & daughter    Level of function/Support: 24 hour support/assist    PCP: Bettie Moseley    DME: 2 wheel walker, shower chair, grab bars, RTS,     Active with any community resources/agencies/skilled home care: [facility]  Medication compliance issues: no    Financial issues that could impact healthcare: no    Transportation at the time of discharge: family  Return home 24 hour support/assist after CoVid test

## 2020-07-13 NOTE — PROGRESS NOTES
Occupational /Physical Therapy  [facility]annasavanna GUPTA [facility]awn    Patient currently in droplet plus for COVID-19 testing will follow up as testing resulted. Thank you,   Gwen Newton.  Yaneth Hirsch OTR/L [address]  [address] [address]

## 2020-07-13 NOTE — PROGRESS NOTES
Progress Note - Dr. Percy Law - Internal Medicine  PCP: MD Carli [id number] / Jackie Clements [phone]    Hospital Day: 2  Code Status: Full Code  Current Diet: DIET GENERAL;        CC: follow up on medical issues    Subjective: Lindsey Rooney is a 80 y.o. female. She denies problems    About the same  No recurrence of neuro sx  Awaiting covid test  Pt/ot on hold until covid status known      I have reviewed the patient's medical and social history in detail and updated the computerized patient record. To recap: She  has a past medical history of Arthritis, CAD (coronary artery disease), GERD (gastroesophageal reflux disease), Hx of blood clots, Hyperlipidemia, and Hypertension. . She  has a past surgical history that includes Cholecystectomy; Appendectomy; Varicose vein surgery; Endoscopy, colon, diagnostic; eye surgery; joint replacement; and Breast surgery. . She  reports that she has never smoked. She has never used smokeless tobacco. She reports that she does not drink alcohol or use drugs. .        Active Hospital Problems    Diagnosis Date Noted    HTN (hypertension), benign [I10]     Dementia due to Alzheimer's disease ([facility] [address] [G30.9, F02.80]     Dyslipidemia [E78.5]     TIA (transient ischemic attack) [G45.9] 07/11/2020    Suspected COVID-19 virus infection [Z20.828] 07/11/2020    Expressive aphasia [R47.01] 04/18/2020    Essential hypertension [I10] 04/18/2020    Ataxia [R27.0] 04/18/2020       Current Facility-Administered Medications: amLODIPine (NORVASC) tablet 10 mg, 10 mg, Oral, Daily  atorvastatin (LIPITOR) tablet 40 mg, 40 mg, Oral, Nightly  calcium elemental (OSCAL) tablet 500 mg, 500 mg, Oral, Daily  clopidogrel (PLAVIX) tablet 75 mg, 75 mg, Oral, Daily  escitalopram (LEXAPRO) tablet 5 mg, 5 mg, Oral, Daily  losartan (COZAAR) tablet 100 mg, 100 mg, Oral, Daily  therapeutic multivitamin-minerals 1 tablet, 1 tablet, Oral, Daily  potassium chloride (KLOR-CON M) extended release tablet 20 mEq, 20 mEq, Oral, TID WC  sodium chloride flush 0.9 % injection 10 mL, 10 mL, Intravenous, 2 times per day  sodium chloride flush 0.9 % injection 10 mL, 10 mL, Intravenous, PRN  magnesium hydroxide (MILK OF MAGNESIA) 400 MG/5ML suspension 30 mL, 30 mL, Oral, Daily PRN  promethazine (PHENERGAN) tablet 12.5 mg, 12.5 mg, Oral, Q6H PRN **OR** ondansetron (ZOFRAN) injection 4 mg, 4 mg, Intravenous, Q6H PRN  enoxaparin (LOVENOX) injection 40 mg, 40 mg, Subcutaneous, Daily  aspirin EC tablet 81 mg, 81 mg, Oral, Daily **OR** aspirin suppository 300 mg, 300 mg, Rectal, Daily  labetalol (NORMODYNE;TRANDATE) injection 10 mg, 10 mg, Intravenous, Q10 Min PRN  famotidine (PEPCID) tablet 20 mg, 20 mg, Oral, Daily  hydrALAZINE (APRESOLINE) injection 10 mg, 10 mg, Intravenous, Q6H PRN  0.9 % sodium chloride bolus, 500 mL, Intravenous, PRN  potassium chloride (KLOR-CON M) extended release tablet 40 mEq, 40 mEq, Oral, PRN **OR** potassium bicarb-citric acid (EFFER-K) effervescent tablet 40 mEq, 40 mEq, Oral, PRN **OR** potassium chloride 10 mEq/100 mL IVPB (Peripheral Line), 10 mEq, Intravenous, PRN         Objective:  /69   Pulse 85   Temp 97.2 °F (36.2 °C) (Oral)   Resp 17   Ht 5' 3\" (1.6 m)   Wt 129 lb 10.1 oz (58.8 kg)   SpO2 98%   BMI 22.96 kg/m²      Patient Vitals for the past 24 hrs:   BP Temp Temp src Pulse Resp SpO2 Weight   07/13/20 0756 -- -- -- -- -- -- 129 lb 10.1 oz (58.8 kg)   07/13/20 0339 118/69 97.2 °F (36.2 °C) Oral 85 17 98 % --   07/13/20 0118 126/63 97.9 °F (36.6 °C) Oral 78 17 95 % --   07/12/20 2204 -- -- -- 78 -- -- --   07/12/20 2130 125/64 98.2 °F (36.8 °C) Oral 81 17 93 % --   07/12/20 1600 131/71 97.9 °F (36.6 °C) Oral 85 18 95 % --   07/12/20 1130 123/70 97.5 °F (36.4 °C) Oral 87 20 96 % --   07/12/20 1118 -- -- -- -- 18 96 % --     Patient Vitals for the past 96 hrs (Last 3 readings):   Weight   07/13/20 0756 129 lb 10.1 oz (58.8 kg)   07/11/20 1209 135 lb (61.2 kg) Intake/Output Summary (Last 24 hours) at 7/13/2020 0848  Last data filed at 7/12/2020 0900  Gross per 24 hour   Intake 10 ml   Output --   Net 10 ml         Physical Exam:   In-person bedside physical examination deferred. ( based on new provisions and guidance offered by [facility] on March 18, 2020 in setting of COVID 19 outbreak and in order to preserve personal protective equipment in accordance with the flexibilities announced by CMS on March 30, 2020)      Labs:  Lab Results   Component Value Date    WBC 7.9 07/13/2020    HGB 10.8 (L) 07/13/2020    HCT 32.4 (L) 07/13/2020     07/13/2020    CHOL 156 07/12/2020    TRIG 45 07/12/2020    HDL 65 (H) 07/12/2020    ALT 8 (L) 07/12/2020    AST 11 (L) 07/12/2020     07/13/2020    K 4.3 07/13/2020     07/13/2020    CREATININE 0.9 07/13/2020    BUN 30 (H) 07/13/2020    CO2 23 07/13/2020    INR 0.95 04/17/2020    LABA1C 5.3 04/23/2020    LABMICR YES 06/08/2020     Lab Results   Component Value Date    TROPONINI <0.01 07/11/2020       Recent Imaging Results are Reviewed:  Xr Shoulder Right (min 2 Views)    Result Date: 7/11/2020  EXAMINATION: THREE XRAY VIEWS OF THE RIGHT SHOULDER 7/11/2020 2:03 pm COMPARISON: None. HISTORY: ORDERING SYSTEM PROVIDED HISTORY: right injury TECHNOLOGIST PROVIDED HISTORY: Reason for exam:->right injury Reason for Exam: c/o pain to right side under arm pit since yesterday, denies injury. family states concerned for stroke, states hx, previous right side deficit. pt denies weakness, states \"its just the pain\") Acuity: Acute Type of Exam: Initial FINDINGS: Reverse shoulder right arthroplasty. No acute fracture or dislocation. Mild acromioclavicular degenerative changes. No acute findings.      Ct Head Wo Contrast    Result Date: 7/11/2020  EXAMINATION: CT OF THE HEAD WITHOUT CONTRAST  7/11/2020 1:08 pm TECHNIQUE: CT of the head was performed without the administration of intravenous contrast. Dose modulation, iterative reconstruction, and/or weight based adjustment of the mA/kV was utilized to reduce the radiation dose to as low as reasonably achievable. COMPARISON: June 8, 2020 HISTORY: ORDERING SYSTEM PROVIDED HISTORY: unable to offer history TECHNOLOGIST PROVIDED HISTORY: If patient is on cardiac monitor and/or pulse ox, they may be taken off cardiac monitor and pulse ox, left on O2 if currently on. All monitors reattached when patient returns to room. Has a \"code stroke\" or \"stroke alert\" been called? ->No Reason for exam:->unable to offer history Reason for Exam: Arm Pain (Pt to Er with c/o pain to right side under arm pit since yesterday, denies injury. family states concerned for stroke, states hx, previous right side deficit. pt denies weakness, states \"its just the pain\") Acuity: Acute Type of Exam: Initial Mechanism of Injury: fell 1 week ago FINDINGS: BRAIN/VENTRICLES: There is no acute intracranial hemorrhage, mass effect or midline shift. No abnormal extra-axial fluid collection. The gray-white differentiation is maintained without evidence of an acute infarct. There is no evidence of hydrocephalus. Scattered periventricular low attenuation which is similar to previous exam and likely related to chronic small vessel disease. Diffuse volume loss. ORBITS: The visualized portion of the orbits demonstrate no acute abnormality. SINUSES: The visualized paranasal sinuses and mastoid air cells demonstrate no acute abnormality. SOFT TISSUES/SKULL:  No acute abnormality of the visualized skull or soft tissues. No acute intracranial abnormality. No significant change in appearance of the head. Critical results were called by Dr. Monico Treadwell MD to [facility] on 7/11/2020 at 13:19. Xr Chest Portable    Result Date: 7/12/2020  EXAMINATION: ONE XRAY VIEW OF THE CHEST 7/12/2020 7:47 am COMPARISON: None.  HISTORY: ORDERING SYSTEM PROVIDED HISTORY: cough, in isolation TECHNOLOGIST PROVIDED HISTORY: Reason for exam:->cough, in isolation Reason for Exam: contact in isolation Acuity: Unknown Type of Exam: Unknown FINDINGS: Cardiac leads project over the chest.  Right shoulder arthroplasty. Elevation of right hemidiaphragm. Mild left basilar opacity is seen. No pleural effusion. No pneumothorax. Cardiac and mediastinal silhouettes are similar to prior. Right upper quadrant clips. Minimal left basilar atelectasis or pneumonitis. Xr Chest Portable    Result Date: 7/11/2020  EXAMINATION: ONE XRAY VIEW OF THE CHEST 7/11/2020 12:41 pm COMPARISON: June 8, 2020 HISTORY: ORDERING SYSTEM PROVIDED HISTORY: SOB TECHNOLOGIST PROVIDED HISTORY: Reason for exam:->SOB Reason for Exam: c/o pain to right side under arm pit since yesterday, denies injury. family states concerned for stroke, states hx, previous right side deficit. pt denies weakness, states \"its just the pain\") Acuity: Acute Type of Exam: Initial FINDINGS: Cardiac silhouette is enlarged. No pneumothorax. No pleural effusion. No consolidation. No acute bony abnormality. No acute findings     Cta Head Neck W Contrast    Addendum Date: 7/12/2020    ADDENDUM: 3D reformat images are now available for review. No aneurysm is detected. 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 Result Date: 7/12/2020  EXAMINATION: CTA OF THE HEAD AND NECK WITH CONTRAST 7/11/2020 1:19 pm: TECHNIQUE: CTA of the head and neck was performed with the administration of intravenous contrast. Multiplanar reformatted images are provided for review. MIP images are provided for review. Stenosis of the internal carotid arteries measured using NASCET criteria. Dose modulation, iterative reconstruction, and/or weight based adjustment of the mA/kV was utilized to reduce the radiation dose to as low as reasonably achievable.  COMPARISON: CT head performed earlier the same day HISTORY: ORDERING SYSTEM PROVIDED HISTORY: stroke, right sided weakness TECHNOLOGIST PROVIDED HISTORY: Reason for exam:->stroke, right sided weakness Reason for Exam: Arm Pain (Pt to Er with c/o pain to right side under arm pit since yesterday, denies injury. family states concerned for stroke, states hx, previous right side deficit. pt denies weakness, states \"its just the pain\") Acuity: Acute Type of Exam: Initial Mechanism of Injury: fell 1 week ago on right side FINDINGS: CTA NECK: AORTIC ARCH/ARCH VESSELS: No dissection or arterial injury. No significant stenosis of the brachiocephalic or subclavian arteries. CAROTID ARTERIES: No dissection, arterial injury, or hemodynamically significant stenosis by NASCET criteria. VERTEBRAL ARTERIES: No dissection, arterial injury, or significant stenosis. Right vertebral artery is dominant SOFT TISSUES: The lung apices are clear. No cervical or superior mediastinal lymphadenopathy. The larynx and pharynx are unremarkable. No acute abnormality of the salivary and thyroid glands. BONES: No acute osseous abnormality. CTA HEAD: ANTERIOR CIRCULATION: No significant stenosis of the intracranial internal carotid, anterior cerebral, or middle cerebral arteries. No aneurysm. 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 POSTERIOR CIRCULATION: No significant stenosis of the vertebral, basilar, or posterior cerebral arteries. No aneurysm. There is a fetal configuration left PCA. OTHER: No dural venous sinus thrombosis on this non-dedicated study. BRAIN: No mass effect or midline shift. No extra-axial fluid collection. The gray-white differentiation is maintained. No significant stenosis or occlusion in the cervical or intracranial vasculature. Assessment and Plan:  Principal Problem:    TIA (transient ischemic attack) -Established problem. Improving.   No sx at this time   Plan: mri ordered, but await COVID swab result back, or possibly be deferred to outpt eval if pt continues to be asymptomatic. Will d/w Neuro if they feel this should still be pursued  Active Problems:    Expressive aphasia     Essential hypertension -Established problem. Stable. 130/74  Plan: cont same meds    Dementia due to Alzheimer's disease ([facility] [address] -Established problem. Stable. Plan: Continue present orders/plan. Ataxia -Established problem. Stable. .  Plan: Continue present orders/plan. Suspected COVID-19 virus infection -Established problem. Stable. covid swab ordered  Plan: place in isolation.     Dyslipidemia  Plan: Continue present orders/plan.               Maryana Olivares  7/13/2020

## 2020-07-13 NOTE — PROGRESS NOTES
Pt up in room, bags packed and ready to go home. Called son and others on her cell phone. Son called out to the  and wants pts cell phone taken away until at least 0900 so pt cannot call anyone else. Assisted pt back into bad, attempted to reorient, and will b here til sometime this morning. Pt is resting now in bed with eyes closed. callbell in reach, bed alarms on.   Tommy Ceballos RN

## 2020-07-14 ENCOUNTER — APPOINTMENT (OUTPATIENT)
Dept: MRI IMAGING | Age: 85
DRG: 069 | End: 2020-07-14
Payer: MEDICARE

## 2020-07-14 VITALS
OXYGEN SATURATION: 96 % | BODY MASS INDEX: 22.3 KG/M2 | WEIGHT: 125.88 LBS | RESPIRATION RATE: 18 BRPM | DIASTOLIC BLOOD PRESSURE: 72 MMHG | SYSTOLIC BLOOD PRESSURE: 110 MMHG | HEART RATE: 86 BPM | HEIGHT: 63 IN | TEMPERATURE: 96.8 F

## 2020-07-14 LAB
ANION GAP SERPL CALCULATED.3IONS-SCNC: 12 MMOL/L (ref 3–16)
BASOPHILS ABSOLUTE: 0 K/UL (ref 0–0.2)
BASOPHILS RELATIVE PERCENT: 0.6 %
BILIRUBIN URINE: NEGATIVE
BLOOD, URINE: NEGATIVE
BUN BLDV-MCNC: 19 MG/DL (ref 7–20)
CALCIUM SERPL-MCNC: 10.1 MG/DL (ref 8.3–10.6)
CHLORIDE BLD-SCNC: 102 MMOL/L (ref 99–110)
CLARITY: CLEAR
CO2: 26 MMOL/L (ref 21–32)
COLOR: YELLOW
CREAT SERPL-MCNC: 0.7 MG/DL (ref 0.6–1.2)
EKG ATRIAL RATE: 96 BPM
EKG DIAGNOSIS: NORMAL
EKG P AXIS: 25 DEGREES
EKG P-R INTERVAL: 144 MS
EKG Q-T INTERVAL: 370 MS
EKG QRS DURATION: 92 MS
EKG QTC CALCULATION (BAZETT): 467 MS
EKG R AXIS: -35 DEGREES
EKG T AXIS: 26 DEGREES
EKG VENTRICULAR RATE: 96 BPM
EOSINOPHILS ABSOLUTE: 0.1 K/UL (ref 0–0.6)
EOSINOPHILS RELATIVE PERCENT: 1.9 %
GFR AFRICAN AMERICAN: >60
GFR NON-AFRICAN AMERICAN: >60
GLUCOSE BLD-MCNC: 89 MG/DL (ref 70–99)
GLUCOSE URINE: NEGATIVE MG/DL
HCT VFR BLD CALC: 39.1 % (ref 36–48)
HEMOGLOBIN: 13.1 G/DL (ref 12–16)
KETONES, URINE: ABNORMAL MG/DL
LEUKOCYTE ESTERASE, URINE: NEGATIVE
LYMPHOCYTES ABSOLUTE: 2.5 K/UL (ref 1–5.1)
LYMPHOCYTES RELATIVE PERCENT: 42.3 %
MCH RBC QN AUTO: 27.7 PG (ref 26–34)
MCHC RBC AUTO-ENTMCNC: 33.4 G/DL (ref 31–36)
MCV RBC AUTO: 82.8 FL (ref 80–100)
MICROSCOPIC EXAMINATION: ABNORMAL
MONOCYTES ABSOLUTE: 0.4 K/UL (ref 0–1.3)
MONOCYTES RELATIVE PERCENT: 6.9 %
NEUTROPHILS ABSOLUTE: 2.8 K/UL (ref 1.7–7.7)
NEUTROPHILS RELATIVE PERCENT: 48.3 %
NITRITE, URINE: NEGATIVE
PDW BLD-RTO: 16.5 % (ref 12.4–15.4)
PH UA: 6.5 (ref 5–8)
PLATELET # BLD: 407 K/UL (ref 135–450)
PMV BLD AUTO: 8.3 FL (ref 5–10.5)
POTASSIUM SERPL-SCNC: 4.2 MMOL/L (ref 3.5–5.1)
PROTEIN UA: NEGATIVE MG/DL
RBC # BLD: 4.73 M/UL (ref 4–5.2)
SODIUM BLD-SCNC: 140 MMOL/L (ref 136–145)
SPECIFIC GRAVITY UA: 1.01 (ref 1–1.03)
URINE REFLEX TO CULTURE: ABNORMAL
URINE TYPE: ABNORMAL
UROBILINOGEN, URINE: 0.2 E.U./DL
WBC # BLD: 5.9 K/UL (ref 4–11)

## 2020-07-14 PROCEDURE — G0378 HOSPITAL OBSERVATION PER HR: HCPCS

## 2020-07-14 PROCEDURE — 6360000002 HC RX W HCPCS: Performed by: INTERNAL MEDICINE

## 2020-07-14 PROCEDURE — 97116 GAIT TRAINING THERAPY: CPT

## 2020-07-14 PROCEDURE — 96372 THER/PROPH/DIAG INJ SC/IM: CPT

## 2020-07-14 PROCEDURE — 80048 BASIC METABOLIC PNL TOTAL CA: CPT

## 2020-07-14 PROCEDURE — 6370000000 HC RX 637 (ALT 250 FOR IP): Performed by: INTERNAL MEDICINE

## 2020-07-14 PROCEDURE — 97530 THERAPEUTIC ACTIVITIES: CPT

## 2020-07-14 PROCEDURE — 81003 URINALYSIS AUTO W/O SCOPE: CPT

## 2020-07-14 PROCEDURE — 85025 COMPLETE CBC W/AUTO DIFF WBC: CPT

## 2020-07-14 PROCEDURE — 97535 SELF CARE MNGMENT TRAINING: CPT

## 2020-07-14 PROCEDURE — 97162 PT EVAL MOD COMPLEX 30 MIN: CPT

## 2020-07-14 PROCEDURE — 36415 COLL VENOUS BLD VENIPUNCTURE: CPT

## 2020-07-14 PROCEDURE — 70551 MRI BRAIN STEM W/O DYE: CPT

## 2020-07-14 PROCEDURE — 97165 OT EVAL LOW COMPLEX 30 MIN: CPT

## 2020-07-14 PROCEDURE — 93010 ELECTROCARDIOGRAM REPORT: CPT | Performed by: INTERNAL MEDICINE

## 2020-07-14 RX ADMIN — ESCITALOPRAM OXALATE 5 MG: 10 TABLET ORAL at 08:33

## 2020-07-14 RX ADMIN — LOSARTAN POTASSIUM 100 MG: 100 TABLET, FILM COATED ORAL at 08:33

## 2020-07-14 RX ADMIN — CLOPIDOGREL 75 MG: 75 TABLET, FILM COATED ORAL at 08:32

## 2020-07-14 RX ADMIN — MULTIPLE VITAMINS W/ MINERALS TAB 1 TABLET: TAB at 08:33

## 2020-07-14 RX ADMIN — ASPIRIN 81 MG: 81 TABLET, COATED ORAL at 08:33

## 2020-07-14 RX ADMIN — POTASSIUM CHLORIDE 20 MEQ: 1500 TABLET, EXTENDED RELEASE ORAL at 08:32

## 2020-07-14 RX ADMIN — CALCIUM 500 MG: 500 TABLET ORAL at 08:33

## 2020-07-14 RX ADMIN — AMLODIPINE BESYLATE 10 MG: 5 TABLET ORAL at 08:33

## 2020-07-14 RX ADMIN — FAMOTIDINE 20 MG: 20 TABLET, FILM COATED ORAL at 08:33

## 2020-07-14 RX ADMIN — POTASSIUM CHLORIDE 20 MEQ: 1500 TABLET, EXTENDED RELEASE ORAL at 11:33

## 2020-07-14 RX ADMIN — ENOXAPARIN SODIUM 40 MG: 40 INJECTION SUBCUTANEOUS at 08:33

## 2020-07-14 NOTE — PLAN OF CARE
Problem: Falls - Risk of:  Goal: Will remain free from falls  Description: Will remain free from falls  7/14/2020 0007 by Scooter Livingston RN  Note: Pt remains free from falls. Safety precautions in place. Bed in lowest position, bed wheels locked, call light with in reach, bed alarm on, yellow blanket in place, fall risk wrist band on, SAFE outside of doorway. Camera in the room, pt close to station. Will continue to monitor. 7/13/2020 1935 by Scooter Livingston RN  Outcome: Ongoing  Note: All fall precautions in place, bed in lowest position, frequent rounding to assist with needs. Pt impulsive an d confused. Bed alarm engaged. Pt absent of fall this shift. Problem: HEMODYNAMIC STATUS  Goal: Patient has stable vital signs and fluid balance  7/14/2020 0007 by Scooter Livingston RN  Outcome: Ongoing  Note:   Vitals:    07/13/20 2230   BP: (!) 168/78   Pulse: 101   Resp: 16   Temp: 97.9 °F (36.6 °C)   SpO2: 96%       7/13/2020 1935 by Scooter Livingston RN  Outcome: Ongoing  Note: Pt VSS. Pt not eating at this time because she is angry. Continuing to encourage patient to eat and drink. Will continue to monitor. Problem: ACTIVITY INTOLERANCE/IMPAIRED MOBILITY  Goal: Mobility/activity is maintained at optimum level for patient  7/14/2020 0007 by Scooter Livingston RN  Outcome: Ongoing  Note: Pt ambulates with SBA.  7/13/2020 1935 by Scooter Livingston RN  Outcome: Ongoing  Note: Pt is ambulatory but is SBA. She is not happy about it and continues to get up at will. Pt is only slightly unsteady. Problem: COMMUNICATION IMPAIRMENT  Goal: Ability to express needs and understand communication  Outcome: Ongoing  Note: Pt can express her needs however d/t her dementia she is unable to understand or maintain information given to her. She is repeatedly told why she is here and why she has to stay.       Problem: Skin Integrity:  Goal: Will show no infection signs and symptoms  Description: Will show no infection signs and symptoms  7/14/2020 0007 by Osbaldo Littlejohn RN  Outcome: Ongoing  Note: Pt afebrile throughout shift. No s/s of infection. 7/13/2020 1935 by Osbaldo Littlejohn RN  Outcome: Ongoing  Goal: Absence of new skin breakdown  Description: Absence of new skin breakdown  Outcome: Ongoing  Note: No new skin breakdown or new reddened bony prominances this shift. Pt repositions self. Will keep skin clean and dry, encourage repositioning  and provide adequate hydration. Problem: Skin Integrity:  Goal: Absence of new skin breakdown  Description: Absence of new skin breakdown  Outcome: Ongoing  Note: No new skin breakdown or new reddened bony prominances this shift. Pt repositions self. Will keep skin clean and dry, encourage repositioning  and provide adequate hydration.

## 2020-07-14 NOTE — PROGRESS NOTES
Education: d/c recommendations - pt not independent in learning  Barriers to Learning: cognition, hearing  REQUIRES OT FOLLOW UP: Yes  Activity Tolerance  Activity Tolerance: Patient Tolerated treatment well  Activity Tolerance: Pt fatiguing toward end of evaluation. Pt spent extensive time on toilet to have loose BM. Safety Devices  Safety Devices in place: Yes  Type of devices: All fall risk precautions in place; Left in chair;Nurse notified;Call light within reach; Chair alarm in place; Patient at risk for falls           Patient Diagnosis(es): The encounter diagnosis was TIA (transient ischemic attack). has a past medical history of Arthritis, CAD (coronary artery disease), GERD (gastroesophageal reflux disease), Hx of blood clots, Hyperlipidemia, and Hypertension. has a past surgical history that includes Cholecystectomy; Appendectomy; Varicose vein surgery; Endoscopy, colon, diagnostic; eye surgery; joint replacement; and Breast surgery. Treatment Diagnosis: Decreased functional mobility, ADL status, balance, activity tolerance, cognition related to TIA      Restrictions  Restrictions/Precautions  Restrictions/Precautions: Fall Risk(HIGH FALL RISK)  Required Braces or Orthoses?: No  Position Activity Restriction  Other position/activity restrictions: The patient is a 80y.o.  years old female with history of dementia, hypertension and hyperlipidemia who was admitted to the hospital yesterday from the ER with acute confusion, generalized weakness and encephalopathy. Subjective   General  Chart Reviewed: Yes  Additional Pertinent Hx: HTN, CAD, CVA, dementia, R TSA  Family / Caregiver Present: No  Diagnosis: TIA  Subjective  Subjective: Pt lying supine in bed upon arrival, agreeable to evaluation. Pt is [place].   Patient Currently in Pain: Denies  Vital Signs  Patient Currently in Pain: Denies  Social/Functional History  Social/Functional History  Lives With: Family(spouse and daughter; pt's son stops by as well)  Type of Home: House  Home Layout: Able to Live on Main level with bedroom/bathroom, Multi-level  Home Access: Ramped entrance  Bathroom Shower/Tub: Tub/Shower unit, Curtain, Shower chair without back  Bathroom Toilet: Standard  Bathroom Equipment: Shower chair, Grab bars in shower, Grab bars around toilet  Home Equipment: Rolling walker, Cane, [address] Help From: Home health(HHOT 2x/week and possibly discharging from HHPT this week.)  ADL Assistance: Independent(pt initially stating \"I don't know\" when asked about assist with mobilty/ADLs and then stating \"I do most of it\". When asked again pt stating \"I have help\" but when asked about daugther helping pt stating \"she'll help me with anything and everything. \")  Homemaking Assistance: (daughter and son assist)  Ambulation Assistance: Independent  Transfer Assistance: Independent  Active : No  Occupation: Retired  Leisure & Hobbies: abigail, travisery  Additional Comments: Recent ARU stay with discharge home May 20 2020, amb with RW, assist for ADL's. Pt questionable historian, providing vague answers during subjective questioning due to aphasia. Called son and reports aide 2x/week and [address] for ADLs. Son reports HH has educated to pt use RW at all times however pt has been slightly non-compliant. Pt's daughter stays at night and stays until 9am and son arrives from 930am and stays with pt until daughter arrives. Son reports pt's spouse is in good health but not able to assist pt with needs. Objective   Hearing: Exceptions to [facility]  Hearing Exceptions: Hard of hearing/hearing concerns    Orientation  Overall Orientation Status: Impaired  Orientation Level: Oriented to person;Disoriented to place; Disoriented to time;Disoriented to situation  Observation/Palpation  Posture: Good  Balance  Sitting Balance: Stand by assistance  Standing Balance: Contact guard assistance(SBA progressing to CGA)  Standing Balance  Time: x8-10 min

## 2020-07-14 NOTE — PROGRESS NOTES
Patient dc'd to home with home health. Instructions reviewed with daughter. Patient removed IV access this am.  Verbalized understanding. Patient to lobby via wheelchair.

## 2020-07-14 NOTE — PROGRESS NOTES
Physical Therapy    Facility/Department: [address]  Initial Assessment    NAME: Lord Nevarez  : 1934  MRN: [id number]    Date of Service: 2020    Discharge Recommendations: Hanna Ceballos scored a 18/24 on the AM-PAC short mobility form. Current research shows that an AM-PAC score of 18 or greater is typically associated with a discharge to the patient's home setting. Based on the patient's AM-PAC score and their current functional mobility deficits, it is recommended that the patient have 2-3 sessions per week of Physical Therapy at d/c to increase the patient's independence. At this time, this patient demonstrates the endurance and safety to discharge home with HHPT and a follow up treatment frequency of 2-3x/wk. Please see assessment section for further patient specific details. HOME HEALTH CARE: LEVEL 1 STANDARD  - Initial home health evaluation to occur within 24-48 hours, in patient home   - Therapy to evaluate with goal of regaining prior level of functioning   - Therapy to evaluate if patient has [id number] needs for personal care    If patient discharges prior to next session this note will serve as a discharge summary. Please see below for the latest assessment towards goals. PT Equipment Recommendations  Equipment Needed: No(has RW at home)    Assessment   Body structures, Functions, Activity limitations: Decreased functional mobility ; Decreased strength;Decreased safe awareness;Decreased cognition;Decreased endurance;Decreased balance  Assessment: Pt presents slightly below baseline functional mobility with impaired functional strength and endurance and decreased standing balance with decreased safety awareness. Pt with PLOF of independence with AD and 24/ supervision/assist and currently requires SBA/CGA for mobility with AD. Pt would benefit from acute PT services to address deficits.   Treatment Diagnosis: impaired gait, transfers, and balance  Prognosis: Good  Decision Making: Medium Complexity  Clinical Presentation: evolving  PT Education: Goals;PT Role;Plan of Care;Transfer Training;Family Education;Orientation;General Safety;Gait Training;Functional Mobility Training  Patient Education: d/c recommendations--pt verbalizing understanding will require reinforcement; extended time spent discussing pt's PLOF with Binu leiva--son verbalizing understanding of therapy session and recommendations  Barriers to Learning: language, cognition  REQUIRES PT FOLLOW UP: Yes  Activity Tolerance  Activity Tolerance: Patient Tolerated treatment well;Patient limited by endurance; Patient limited by cognitive status  Activity Tolerance: pt required frequent seated breaks at toilet due to frequent diarrhea, extended time also required due to cognition/aphasia       Patient Diagnosis(es): The encounter diagnosis was TIA (transient ischemic attack). has a past medical history of Arthritis, CAD (coronary artery disease), GERD (gastroesophageal reflux disease), Hx of blood clots, Hyperlipidemia, and Hypertension. has a past surgical history that includes Cholecystectomy; Appendectomy; Varicose vein surgery; Endoscopy, colon, diagnostic; eye surgery; joint replacement; and Breast surgery. Restrictions  Restrictions/Precautions  Restrictions/Precautions: Fall Risk(HIGH FALL RISK)  Required Braces or Orthoses?: No  Position Activity Restriction  Other position/activity restrictions: The patient is a 80y.o.  years old female with history of dementia, hypertension and hyperlipidemia who was admitted to the hospital yesterday from the ER with acute confusion, generalized weakness and encephalopathy.      Vision/Hearing  Vision: Impaired  Vision Exceptions: Wears glasses at all times       Subjective  General  Chart Reviewed: Yes  Response To Previous Treatment: Not applicable  Family / Caregiver Present: No  Diagnosis: TIA  General Comment  Comments: Pt supine in bed to assist pt with needs. Cognition   Cognition  Overall Cognitive Status: Exceptions  Cognition Comment: pt with aphasia, requiring increased time to follow commands requiring repetition for all commands. Objective  AROM RLE (degrees)  RLE AROM: WFL  AROM LLE (degrees)  LLE AROM : WFL  Strength RLE  Strength RLE: WFL  Comment: hip grossly 3+/5; knee grossly 4/5; ankle grossly 3+/5  Strength LLE  Strength LLE: WFL  Comment: hip grossly 3+/5; knee grossly 4/5; ankle grossly 3+/5  Tone RLE  RLE Tone: Normotonic  Tone LLE  LLE Tone: Normotonic  Motor Control  Gross Motor?: WFL  Coordination  Rapid Alternating Movements: Normal  Heel to Shin: Normal  Sensation  Overall Sensation Status: WFL  Bed mobility  Supine to Sit: Stand by assistance(HOB raised)  Scooting: Stand by assistance  Transfers  Sit to Stand: Stand by assistance;Contact guard assistance  Stand to sit: Stand by assistance;Contact guard assistance  Comment: Pt performed several transfers throughout session from toilet with SBA>>>CGA due to progressing fatigue and decreased safety awareness. No LOB. Ambulation  Ambulation?: Yes  More Ambulation?: Yes  Ambulation 1  Surface: level tile  Device: No Device  Assistance: Stand by assistance  Quality of Gait: wide Yennifer, forward flexed trunk, decreased wes  Distance: 15'  Comments: Pt required increased time to perform and intermittently reaching for bedrail, nightstand, doorframe, and wall for balance. No LOB. Ambulation 2  Surface - 2: level tile  Device 2: Rolling Walker  Assistance 2: Contact guard assistance  Quality of Gait 2: same as above  Distance: 15'  Comments: Pt required increased time to perform and max VC for walker management, no LOB. CGA provided due to extended time spent in bathroom with ADLs and slight fatigue noted with mobility.   Stairs/Curb  Stairs?: No     Balance  Posture: Fair  Sitting - Static: Good;-(Supervision/SBA seated at toilet for several bouts of toileting (pt experiencing bouts of diarrhea during session) ~30 minutes total)  Sitting - Dynamic: Good;-(SBA seated at toilet for pericare ~10 minutes total)  Standing - Static: Fair(SBA>CGA standing at sink for hand hygeine ~8-10 minutes total as pt required 2-3 bouts of hand hygeine due to frequent diarrhea)  Standing - Dynamic: Fair(SBA/CGA with AD)        Plan   Plan  Times per week: 3-5x  Times per day: Daily  Current Treatment Recommendations: Strengthening, Neuromuscular Re-education, Balance Training, Functional Mobility Training, Transfer Training, Gait Training, Equipment Evaluation, Education, & procurement, Patient/Caregiver Education & Training, Safety Education & Training, Home Exercise Program, Positioning  Safety Devices  Type of devices:  All fall risk precautions in place, Left in chair, Call light within reach, Chair alarm in place, Gait belt, Patient at risk for falls, Nurse notified(MOHSEN Pedersen, aware)  Restraints  Initially in place: No    G-Code       OutComes Score       AM-PAC Score  AM-PAC Inpatient Mobility Raw Score : 18 (07/14/20 1100)  AM-PAC Inpatient T-Scale Score : 43.63 (07/14/20 1100)  Mobility Inpatient CMS 0-100% Score: 46.58 (07/14/20 1100)  Mobility Inpatient CMS G-Code Modifier : CK (07/14/20 1100)          Goals  Short term goals  Time Frame for Short term goals: upon d/c  Short term goal 1: Pt will perform bed mobility with supervision  Short term goal 2: Pt will perform transfers with RW and supervision  Short term goal 3: Pt will ambulate 22' with RW and supervision  Patient Goals   Patient goals : to go home       Therapy Time   Individual Concurrent Group Co-treatment   Time In 0925         Time Out 1048         Minutes 83              Timed Code Treatment Minutes:   68    Total Treatment Minutes:  83 (extended time spent discussing pt's PLOF and assistance required at home with pt's son, Caitlin Schofield)    [id number] [facility], [address] [place] [address], [address] [place] [address]

## 2020-07-14 NOTE — PLAN OF CARE
Problem: Falls - Risk of:  Goal: Will remain free from falls  Description: Will remain free from falls  Outcome: Ongoing     Problem: HEMODYNAMIC STATUS  Goal: Patient has stable vital signs and fluid balance  Outcome: Ongoing     Problem: ACTIVITY INTOLERANCE/IMPAIRED MOBILITY  Goal: Mobility/activity is maintained at optimum level for patient  Outcome: Ongoing     Problem: Skin Integrity:  Goal: Will show no infection signs and symptoms  Description: Will show no infection signs and symptoms  Outcome: Ongoing   Patient has remained free of falls. Call light within reach, bed in lowest position, and wheels locked. VSS. Oriented to self. COVID neg. Waiting on MRI.

## 2020-07-14 NOTE — PROGRESS NOTES
Progress Note - Dr. KELLOGG [facility] [place] [facility] Medicine  PCP: Fawn Arellano MD [id number] / [address] [phone]    Hospital Day: 3  Code Status: Full Code  Current Diet: DIET GENERAL;        CC: follow up on medical issues    Subjective: Nava Zamudio is a 80 y.o. female. She denies problems    Doing ok  No new issues  More alert  Awaiting MRI    She denies chest pain, denies shortness of breath, denies nausea,  denies emesis. 10 system Review of Systems is reviewed with patient, and pertinent positives are listed here: None . Otherwise, Review of systems is negative. I have reviewed the patient's medical and social history in detail and updated the computerized patient record. To recap: She  has a past medical history of Arthritis, CAD (coronary artery disease), GERD (gastroesophageal reflux disease), Hx of blood clots, Hyperlipidemia, and Hypertension. . She  has a past surgical history that includes Cholecystectomy; Appendectomy; Varicose vein surgery; Endoscopy, colon, diagnostic; eye surgery; joint replacement; and Breast surgery. . She  reports that she has never smoked. She has never used smokeless tobacco. She reports that she does not drink alcohol or use drugs. .        Active Hospital Problems    Diagnosis Date Noted    HTN (hypertension), benign [I10]     Dementia due to Alzheimer's disease ([facility] [address] [G30.9, F02.80]     Dyslipidemia [E78.5]     TIA (transient ischemic attack) [G45.9] 07/11/2020    Suspected COVID-19 virus infection [Z20.828] 07/11/2020    Expressive aphasia [R47.01] 04/18/2020    Essential hypertension [I10] 04/18/2020    Ataxia [R27.0] 04/18/2020       Current Facility-Administered Medications: amLODIPine (NORVASC) tablet 10 mg, 10 mg, Oral, Daily  atorvastatin (LIPITOR) tablet 40 mg, 40 mg, Oral, Nightly  calcium elemental (OSCAL) tablet 500 mg, 500 mg, Oral, Daily  clopidogrel (PLAVIX) tablet 75 mg, 75 mg, Oral, Daily  escitalopram (LEXAPRO) tablet 5 mg, 5 mg, Oral, Daily  losartan (COZAAR) tablet 100 mg, 100 mg, Oral, Daily  therapeutic multivitamin-minerals 1 tablet, 1 tablet, Oral, Daily  potassium chloride (KLOR-CON M) extended release tablet 20 mEq, 20 mEq, Oral, TID WC  sodium chloride flush 0.9 % injection 10 mL, 10 mL, Intravenous, 2 times per day  sodium chloride flush 0.9 % injection 10 mL, 10 mL, Intravenous, PRN  magnesium hydroxide (MILK OF MAGNESIA) 400 MG/5ML suspension 30 mL, 30 mL, Oral, Daily PRN  promethazine (PHENERGAN) tablet 12.5 mg, 12.5 mg, Oral, Q6H PRN **OR** ondansetron (ZOFRAN) injection 4 mg, 4 mg, Intravenous, Q6H PRN  enoxaparin (LOVENOX) injection 40 mg, 40 mg, Subcutaneous, Daily  aspirin EC tablet 81 mg, 81 mg, Oral, Daily **OR** aspirin suppository 300 mg, 300 mg, Rectal, Daily  labetalol (NORMODYNE;TRANDATE) injection 10 mg, 10 mg, Intravenous, Q10 Min PRN  famotidine (PEPCID) tablet 20 mg, 20 mg, Oral, Daily  hydrALAZINE (APRESOLINE) injection 10 mg, 10 mg, Intravenous, Q6H PRN  0.9 % sodium chloride bolus, 500 mL, Intravenous, PRN  potassium chloride (KLOR-CON M) extended release tablet 40 mEq, 40 mEq, Oral, PRN **OR** potassium bicarb-citric acid (EFFER-K) effervescent tablet 40 mEq, 40 mEq, Oral, PRN **OR** potassium chloride 10 mEq/100 mL IVPB (Peripheral Line), 10 mEq, Intravenous, PRN         Objective:  BP (!) 163/78   Pulse 83   Temp 97.7 °F (36.5 °C) (Oral)   Resp 19   Ht 5' 3\" (1.6 m)   Wt 129 lb 10.1 oz (58.8 kg)   SpO2 95%   BMI 22.96 kg/m²      Patient Vitals for the past 24 hrs:   BP Temp Temp src Pulse Resp SpO2   07/14/20 0410 (!) 163/78 97.7 °F (36.5 °C) Oral 83 19 95 %   07/14/20 0130 (!) 143/73 97.7 °F (36.5 °C) Oral 75 17 95 %   07/13/20 2230 (!) 168/78 97.9 °F (36.6 °C) Oral 101 16 96 %   07/13/20 1600 (!) 155/73 97.3 °F (36.3 °C) Oral 93 16 96 %   07/13/20 1115 132/67 97.8 °F (36.6 °C) Oral 82 16 95 %   07/13/20 0845 130/74 97.9 °F (36.6 °C) Oral 80 16 98 %     Patient Vitals for the past 96 hrs (Last 3 readings):   Weight   07/13/20 0756 129 lb 10.1 oz (58.8 kg)   07/11/20 1209 135 lb (61.2 kg)           Intake/Output Summary (Last 24 hours) at 7/14/2020 5982  Last data filed at 7/13/2020 2230  Gross per 24 hour   Intake --   Output 600 ml   Net -600 ml         Physical Exam:   S1, S2 normal, no murmur, rub or gallop, regular rate and rhythm  clear to auscultation bilaterally  abdomen is soft without significant tenderness, masses, organomegaly or guarding  extremities normal, atraumatic, no cyanosis or edema    Labs:  Lab Results   Component Value Date    WBC 5.9 07/14/2020    HGB 13.1 07/14/2020    HCT 39.1 07/14/2020     07/14/2020    CHOL 156 07/12/2020    TRIG 45 07/12/2020    HDL 65 (H) 07/12/2020    ALT 8 (L) 07/12/2020    AST 11 (L) 07/12/2020     07/14/2020    K 4.2 07/14/2020     07/14/2020    CREATININE 0.7 07/14/2020    BUN 19 07/14/2020    CO2 26 07/14/2020    INR 0.95 04/17/2020    LABA1C 5.2 07/12/2020    LABMICR Not Indicated 07/14/2020     Lab Results   Component Value Date    TROPONINI <0.01 07/11/2020       Recent Imaging Results are Reviewed:  Xr Shoulder Right (min 2 Views)    Result Date: 7/11/2020  EXAMINATION: THREE XRAY VIEWS OF THE RIGHT SHOULDER 7/11/2020 2:03 pm COMPARISON: None. HISTORY: ORDERING SYSTEM PROVIDED HISTORY: right injury TECHNOLOGIST PROVIDED HISTORY: Reason for exam:->right injury Reason for Exam: c/o pain to right side under arm pit since yesterday, denies injury. family states concerned for stroke, states hx, previous right side deficit. pt denies weakness, states \"its just the pain\") Acuity: Acute Type of Exam: Initial FINDINGS: Reverse shoulder right arthroplasty. No acute fracture or dislocation. Mild acromioclavicular degenerative changes. No acute findings.      Ct Head Wo Contrast    Result Date: 7/11/2020  EXAMINATION: CT OF THE HEAD WITHOUT CONTRAST  7/11/2020 1:08 pm TECHNIQUE: CT of the head was performed without the administration of intravenous contrast. Dose modulation, iterative reconstruction, and/or weight based adjustment of the mA/kV was utilized to reduce the radiation dose to as low as reasonably achievable. COMPARISON: June 8, 2020 HISTORY: ORDERING SYSTEM PROVIDED HISTORY: unable to offer history TECHNOLOGIST PROVIDED HISTORY: If patient is on cardiac monitor and/or pulse ox, they may be taken off cardiac monitor and pulse ox, left on O2 if currently on. All monitors reattached when patient returns to room. Has a \"code stroke\" or \"stroke alert\" been called? ->No Reason for exam:->unable to offer history Reason for Exam: Arm Pain (Pt to Er with c/o pain to right side under arm pit since yesterday, denies injury. family states concerned for stroke, states hx, previous right side deficit. pt denies weakness, states \"its just the pain\") Acuity: Acute Type of Exam: Initial Mechanism of Injury: fell 1 week ago FINDINGS: BRAIN/VENTRICLES: There is no acute intracranial hemorrhage, mass effect or midline shift. No abnormal extra-axial fluid collection. The gray-white differentiation is maintained without evidence of an acute infarct. There is no evidence of hydrocephalus. Scattered periventricular low attenuation which is similar to previous exam and likely related to chronic small vessel disease. Diffuse volume loss. ORBITS: The visualized portion of the orbits demonstrate no acute abnormality. SINUSES: The visualized paranasal sinuses and mastoid air cells demonstrate no acute abnormality. SOFT TISSUES/SKULL:  No acute abnormality of the visualized skull or soft tissues. No acute intracranial abnormality. No significant change in appearance of the head. Critical results were called by Dr. Mavis Eastman MD to Yasmani Dickson on 7/11/2020 at 13:19. Xr Chest Portable    Result Date: 7/12/2020  EXAMINATION: ONE XRAY VIEW OF THE CHEST 7/12/2020 7:47 am COMPARISON: None.  HISTORY: ORDERING SYSTEM PROVIDED HISTORY: cough, in isolation TECHNOLOGIST PROVIDED HISTORY: Reason for exam:->stroke, right sided weakness Reason for Exam: Arm Pain (Pt to Er with c/o pain to right side under arm pit since yesterday, denies injury. family states concerned for stroke, states hx, previous right side deficit. pt denies weakness, states \"its just the pain\") Acuity: Acute Type of Exam: Initial Mechanism of Injury: fell 1 week ago on right side FINDINGS: CTA NECK: AORTIC ARCH/ARCH VESSELS: No dissection or arterial injury. No significant stenosis of the brachiocephalic or subclavian arteries. CAROTID ARTERIES: No dissection, arterial injury, or hemodynamically significant stenosis by NASCET criteria. VERTEBRAL ARTERIES: No dissection, arterial injury, or significant stenosis. Right vertebral artery is dominant SOFT TISSUES: The lung apices are clear. No cervical or superior mediastinal lymphadenopathy. The larynx and pharynx are unremarkable. No acute abnormality of the salivary and thyroid glands. BONES: No acute osseous abnormality. CTA HEAD: ANTERIOR CIRCULATION: No significant stenosis of the intracranial internal carotid, anterior cerebral, or middle cerebral arteries. No aneurysm. 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 POSTERIOR CIRCULATION: No significant stenosis of the vertebral, basilar, or posterior cerebral arteries. No aneurysm. There is a fetal configuration left PCA. OTHER: No dural venous sinus thrombosis on this non-dedicated study. BRAIN: No mass effect or midline shift. No extra-axial fluid collection. The gray-white differentiation is maintained. No significant stenosis or occlusion in the cervical or intracranial vasculature. Assessment and Plan:  Principal Problem:    TIA (transient ischemic attack) -Established problem. Stable. No new sx. Appears stable  Plan: for MRI today.  Appreciate ID eval  Active Problems:    Expressive aphasia

## 2020-07-23 NOTE — DISCHARGE SUMMARY
[facility] -- Physician Discharge Summary     Natali Cormier  1934  MRN: [id number]    Admit Date: 7/11/2020  Discharge Date: 7/14/2020  7:19 PM    Attending MD: No att. providers found  Discharging MD: Guillermo Glover MD  PCP: MD Carli [address] [facility] [phone]    Admission Diagnosis: TIA (transient ischemic attack) [G45.9]  TIA (transient ischemic attack) [G45.9]  DISCHARGE DIAGNOSIS: same      Full Hospital Problem List:  Active Hospital Problems    Diagnosis Date Noted    HTN (hypertension), benign [I10]     Dementia due to Alzheimer's disease ([facility] [address] [G30.9, F02.80]     Dyslipidemia [E78.5]     TIA (transient ischemic attack) [G45.9] 07/11/2020    Suspected COVID-19 virus infection [Z20.828] 07/11/2020    Expressive aphasia [R47.01] 04/18/2020    Essential hypertension [I10] 04/18/2020    Ataxia [R27.0] 04/18/2020           Hospital Course:  80 y. o. female Presents to the ER with a complaint of right sided pain that began sometime overnight.  She reports that she is here for an xray.  Patient does have difficulty offering history, she does have history of stroke.   Family comes into ER and gives some other history - Reports that his mother fell about a week ago. Kunal Cleaves not believe that she injured herself. Danielle Falling that she has been very active recently with history of stroke in March, acute cortical infarct.  States that she was back in the hospital in May and thought maybe she had a urinary tract infection versus may be a second mini stroke.   Reports that she has been home for some time and is very independent, she is supposed to be walking with a walker but she is generally found without the walker.  He reports that yesterday she was doing laundry and went to bed around 9 PM.  Had a normal, productive day.  States that he was unable to get her up today.  When he was taking her to the car she was dragging her right leg and had limited use of the right arm.     She does have history of a aphasia secondary to stroke in March.  Her speech pattern is normal today. COVID swab is done in ER as COVID infection might explain symptoms, this test does come back negative.     Pt to be admitted for further workup  CT Head in ER shows  Impression:          No acute intracranial abnormality. No significant change in appearance of the head. Pt then has MRI done lynn in the hospitalization  Impression:          1. No acute intracranial abnormality.  No acute infarct. 2. Mild global parenchymal volume loss with moderate chronic microvascular   ischemic changes. Pt is seen by neurology while here  Given essentially normal MRI and resolution of symptoms, no further workup is recommended  PT/OT evaluate patient and recommend return to home environment      Consults made during Hospitalization:  [address] [place] [address]    Treatment team at time of Discharge: Treatment Team: Consulting Physician: Tracey Manzanares MD; : YULIANA Rose; Respiratory Therapist (Day): Lethea Nyhan; Registered Nurse: Twila Pimentel RN; Respiratory Therapist (Night): Ace Montesinos RCP; Respiratory Therapist (Night): Marcene Gottron, RCP; Registered Nurse: Manisha Barnard RN    Imaging Results:  Xr Shoulder Right (min 2 Views)    Result Date: 7/11/2020  EXAMINATION: THREE XRAY VIEWS OF THE RIGHT SHOULDER 7/11/2020 2:03 pm COMPARISON: None. HISTORY: ORDERING SYSTEM PROVIDED HISTORY: right injury TECHNOLOGIST PROVIDED HISTORY: Reason for exam:->right injury Reason for Exam: c/o pain to right side under arm pit since yesterday, denies injury. family states concerned for stroke, states hx, previous right side deficit. pt denies weakness, states \"its just the pain\") Acuity: Acute Type of Exam: Initial FINDINGS: Reverse shoulder right arthroplasty. No acute fracture or dislocation.   Mild acromioclavicular degenerative changes. No acute findings. Ct Head Wo Contrast    Result Date: 7/11/2020  EXAMINATION: CT OF THE HEAD WITHOUT CONTRAST  7/11/2020 1:08 pm TECHNIQUE: CT of the head was performed without the administration of intravenous contrast. Dose modulation, iterative reconstruction, and/or weight based adjustment of the mA/kV was utilized to reduce the radiation dose to as low as reasonably achievable. COMPARISON: June 8, 2020 HISTORY: ORDERING SYSTEM PROVIDED HISTORY: unable to offer history TECHNOLOGIST PROVIDED HISTORY: If patient is on cardiac monitor and/or pulse ox, they may be taken off cardiac monitor and pulse ox, left on O2 if currently on. All monitors reattached when patient returns to room. Has a \"code stroke\" or \"stroke alert\" been called? ->No Reason for exam:->unable to offer history Reason for Exam: Arm Pain (Pt to Er with c/o pain to right side under arm pit since yesterday, denies injury. family states concerned for stroke, states hx, previous right side deficit. pt denies weakness, states \"its just the pain\") Acuity: Acute Type of Exam: Initial Mechanism of Injury: fell 1 week ago FINDINGS: BRAIN/VENTRICLES: There is no acute intracranial hemorrhage, mass effect or midline shift. No abnormal extra-axial fluid collection. The gray-white differentiation is maintained without evidence of an acute infarct. There is no evidence of hydrocephalus. Scattered periventricular low attenuation which is similar to previous exam and likely related to chronic small vessel disease. Diffuse volume loss. ORBITS: The visualized portion of the orbits demonstrate no acute abnormality. SINUSES: The visualized paranasal sinuses and mastoid air cells demonstrate no acute abnormality. SOFT TISSUES/SKULL:  No acute abnormality of the visualized skull or soft tissues. No acute intracranial abnormality. No significant change in appearance of the head.  Critical results were called by Dr. Kadeem Beltran MD to [facility] Caitie Castro on 7/11/2020 at 13:19. Xr Chest Portable    Result Date: 7/12/2020  EXAMINATION: ONE XRAY VIEW OF THE CHEST 7/12/2020 7:47 am COMPARISON: None. HISTORY: ORDERING SYSTEM PROVIDED HISTORY: cough, in isolation TECHNOLOGIST PROVIDED HISTORY: Reason for exam:->cough, in isolation Reason for Exam: contact in isolation Acuity: Unknown Type of Exam: Unknown FINDINGS: Cardiac leads project over the chest.  Right shoulder arthroplasty. Elevation of right hemidiaphragm. Mild left basilar opacity is seen. No pleural effusion. No pneumothorax. Cardiac and mediastinal silhouettes are similar to prior. Right upper quadrant clips. Minimal left basilar atelectasis or pneumonitis. Xr Chest Portable    Result Date: 7/11/2020  EXAMINATION: ONE XRAY VIEW OF THE CHEST 7/11/2020 12:41 pm COMPARISON: June 8, 2020 HISTORY: ORDERING SYSTEM PROVIDED HISTORY: SOB TECHNOLOGIST PROVIDED HISTORY: Reason for exam:->SOB Reason for Exam: c/o pain to right side under arm pit since yesterday, denies injury. family states concerned for stroke, states hx, previous right side deficit. pt denies weakness, states \"its just the pain\") Acuity: Acute Type of Exam: Initial FINDINGS: Cardiac silhouette is enlarged. No pneumothorax. No pleural effusion. No consolidation. No acute bony abnormality. No acute findings     Cta Head Neck W Contrast    Addendum Date: 7/12/2020    ADDENDUM: 3D reformat images are now available for review. No aneurysm is detected. 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 Result Date: 7/12/2020  EXAMINATION: CTA OF THE HEAD AND NECK WITH CONTRAST 7/11/2020 1:19 pm: TECHNIQUE: CTA of the head and neck was performed with the administration of intravenous contrast. Multiplanar reformatted images are provided for review. MIP images are provided for review. Stenosis of the internal carotid arteries measured using NASCET criteria.  Dose modulation, iterative reconstruction, and/or weight based adjustment of the mA/kV was utilized to reduce the radiation dose to as low as reasonably achievable. COMPARISON: CT head performed earlier the same day HISTORY: ORDERING SYSTEM PROVIDED HISTORY: stroke, right sided weakness TECHNOLOGIST PROVIDED HISTORY: Reason for exam:->stroke, right sided weakness Reason for Exam: Arm Pain (Pt to Er with c/o pain to right side under arm pit since yesterday, denies injury. family states concerned for stroke, states hx, previous right side deficit. pt denies weakness, states \"its just the pain\") Acuity: Acute Type of Exam: Initial Mechanism of Injury: fell 1 week ago on right side FINDINGS: CTA NECK: AORTIC ARCH/ARCH VESSELS: No dissection or arterial injury. No significant stenosis of the brachiocephalic or subclavian arteries. CAROTID ARTERIES: No dissection, arterial injury, or hemodynamically significant stenosis by NASCET criteria. VERTEBRAL ARTERIES: No dissection, arterial injury, or significant stenosis. Right vertebral artery is dominant SOFT TISSUES: The lung apices are clear. No cervical or superior mediastinal lymphadenopathy. The larynx and pharynx are unremarkable. No acute abnormality of the salivary and thyroid glands. BONES: No acute osseous abnormality. CTA HEAD: ANTERIOR CIRCULATION: No significant stenosis of the intracranial internal carotid, anterior cerebral, or middle cerebral arteries. No aneurysm. 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 POSTERIOR CIRCULATION: No significant stenosis of the vertebral, basilar, or posterior cerebral arteries. No aneurysm. There is a fetal configuration left PCA. OTHER: No dural venous sinus thrombosis on this non-dedicated study. BRAIN: No mass effect or midline shift. No extra-axial fluid collection. The gray-white differentiation is maintained.      No significant stenosis or occlusion in the cervical or intracranial vasculature. Mri Brain Without Contrast    Result Date: 7/14/2020  EXAMINATION: MRI OF THE BRAIN WITHOUT CONTRAST  7/14/2020 4:00 pm TECHNIQUE: Multiplanar multisequence MRI of the brain was performed without the administration of intravenous contrast. COMPARISON: 04/21/2020. HISTORY: ORDERING SYSTEM PROVIDED HISTORY: TIA TECHNOLOGIST PROVIDED HISTORY: Reason for exam:->TIA Reason for Exam: Right side leg dragging 7/5/20. No history of cancer. History of high bp. Acuity: Acute Type of Exam: Subsequent/Follow-up FINDINGS: INTRACRANIAL STRUCTURES/VENTRICLES: There is no evidence of an acute infarct. No mass effect or midline shift. No evidence to suggest acute intracranial hemorrhage. Areas of T2 FLAIR hyperintensity are seen in the periventricular and subcortical white matter, which are nonspecific, but may represent chronic microvascular ischemic change. There is prominence of the ventricles and sulci due to global parenchymal volume loss. The normal signal voids within the major intracranial vessels appear maintained. No acute abnormality within the sellar or suprasellar region. ORBITS: The visualized portion of the orbits demonstrate no acute abnormality. SINUSES: The visualized paranasal sinuses demonstrate no acute abnormality. Trace right mastoid effusion. BONES/SOFT TISSUES: The bone marrow signal intensity appears normal. The soft tissues demonstrate no acute abnormality. 1. No acute intracranial abnormality. No acute infarct. 2. Mild global parenchymal volume loss with moderate chronic microvascular ischemic changes.          Discharge Exam:  /72   Pulse 86   Temp 96.8 °F (36 °C) (Temporal)   Resp 18   Ht 5' 3\" (1.6 m)   Wt 125 lb 14.1 oz (57.1 kg)   SpO2 96%   BMI 22.30 kg/m²   General appearance: alert, appears stated age and cooperative  Head: Normocephalic, without obvious abnormality, atraumatic  Lungs: clear to auscultation bilaterally  Heart: regular rate and rhythm, S1, S2 normal, no murmur, click, rub or gallop  Abdomen: soft, non-tender; bowel sounds normal; no masses,  no organomegaly  Extremities: extremities normal, atraumatic, no cyanosis or edema    Disposition: home    Condition: stable    Discharge Medications:   Caridad Olivo   Home Medication Instructions LQA:[id number]    Printed on:07/22/20 6420   Medication Information                      amLODIPine (NORVASC) 10 MG tablet  Take 1 tablet by mouth daily             aspirin 81 MG EC tablet  Take 1 tablet by mouth daily             atorvastatin (LIPITOR) 40 MG tablet  Take 1 tablet by mouth nightly             Biotin 1000 MCG TABS  Take 1,000 mcg by mouth Daily with supper             calcium carbonate (OSCAL) 500 MG TABS tablet  Take 500 mg by mouth daily             clopidogrel (PLAVIX) 75 MG tablet  Take 1 tablet by mouth daily             escitalopram (LEXAPRO) 5 MG tablet  Take 1 tablet by mouth daily             famotidine (PEPCID) 20 MG tablet  Take 1 tablet by mouth 2 times daily             losartan (COZAAR) 100 MG tablet  Take 100 mg by mouth daily             Multiple Vitamins-Minerals (MULTIVITAMIN ADULT PO)  Take by mouth             potassium chloride (KLOR-CON M) 20 MEQ extended release tablet  Take 1 tablet by mouth 3 times daily                 Allergies: Allergies   Allergen Reactions    Amoxicillin Hives    Bupivacaine Hcl Shortness Of Breath     Had a reaction after an injection of omnitaque, lidocaine, sensorcaine and kenalog. She isn't sure what caused her problems.  Lidocaine Shortness Of Breath, Other (See Comments), Nausea Only and Swelling     Received this during her RODRICK at same time as Kenalog so unknown what she reacted to. Had a reaction after an injection of omnitaque, lidocaine, sensorcaine and kenalog. She isn't sure what caused her problems.       Iodides     Ketamine     Lisinopril     Sulfa Antibiotics Nausea Only     gastritis      Triamcinolone Swelling    Iohexol Nausea And Vomiting and Swelling     Had back injection and had reaction. The other drugs included with injection are Lidocaine, sensorcaine, and kenalog. Follow up Instructions: Follow-up with PCP: Rasheed Gayle MD in 2 wk .       Total time spent on day of discharge including face-to-face visit, examination, documentation, counseling, preparation of discharge plans and followup, and discharge medicine reconciliation and presciptions is 33 minutes    Signed:  Gavin Mata MD  7/22/2020

## 2020-07-28 ENCOUNTER — HOSPITAL ENCOUNTER (OUTPATIENT)
Age: 85
Setting detail: SPECIMEN
Discharge: HOME | End: 2020-07-28
Payer: MEDICARE

## 2020-07-28 LAB
ANION GAP SERPL CALCULATED.3IONS-SCNC: 11 MMOL/L (ref 3–16)
BASOPHILS ABSOLUTE: 0.1 K/UL (ref 0–0.2)
BASOPHILS RELATIVE PERCENT: 0.9 %
BUN BLDV-MCNC: 14 MG/DL (ref 7–20)
CALCIUM SERPL-MCNC: 9.7 MG/DL (ref 8.3–10.6)
CHLORIDE BLD-SCNC: 103 MMOL/L (ref 99–110)
CO2: 27 MMOL/L (ref 21–32)
CREAT SERPL-MCNC: 1.2 MG/DL (ref 0.6–1.2)
EOSINOPHILS ABSOLUTE: 0.2 K/UL (ref 0–0.6)
EOSINOPHILS RELATIVE PERCENT: 3 %
GFR AFRICAN AMERICAN: 52
GFR NON-AFRICAN AMERICAN: 43
GLUCOSE BLD-MCNC: 95 MG/DL (ref 70–99)
HCT VFR BLD CALC: 33.5 % (ref 36–48)
HEMOGLOBIN: 11.5 G/DL (ref 12–16)
LYMPHOCYTES ABSOLUTE: 1.8 K/UL (ref 1–5.1)
LYMPHOCYTES RELATIVE PERCENT: 30.8 %
MCH RBC QN AUTO: 28.8 PG (ref 26–34)
MCHC RBC AUTO-ENTMCNC: 34.4 G/DL (ref 31–36)
MCV RBC AUTO: 83.8 FL (ref 80–100)
MONOCYTES ABSOLUTE: 0.3 K/UL (ref 0–1.3)
MONOCYTES RELATIVE PERCENT: 5.3 %
NEUTROPHILS ABSOLUTE: 3.5 K/UL (ref 1.7–7.7)
NEUTROPHILS RELATIVE PERCENT: 60 %
PDW BLD-RTO: 17.1 % (ref 12.4–15.4)
PLATELET # BLD: 250 K/UL (ref 135–450)
PMV BLD AUTO: 9 FL (ref 5–10.5)
POTASSIUM SERPL-SCNC: 4 MMOL/L (ref 3.5–5.1)
RBC # BLD: 4 M/UL (ref 4–5.2)
SODIUM BLD-SCNC: 141 MMOL/L (ref 136–145)
T4 TOTAL: 6.1 UG/DL (ref 4.5–10.9)
TSH SERPL DL<=0.05 MIU/L-ACNC: 2.83 UIU/ML (ref 0.27–4.2)
VITAMIN B-12: 665 PG/ML (ref 211–911)
WBC # BLD: 5.8 K/UL (ref 4–11)

## 2020-07-28 PROCEDURE — 80048 BASIC METABOLIC PNL TOTAL CA: CPT

## 2020-07-28 PROCEDURE — 84443 ASSAY THYROID STIM HORMONE: CPT

## 2020-07-28 PROCEDURE — 85025 COMPLETE CBC W/AUTO DIFF WBC: CPT

## 2020-07-28 PROCEDURE — 84436 ASSAY OF TOTAL THYROXINE: CPT

## 2020-07-28 PROCEDURE — 82607 VITAMIN B-12: CPT

## 2020-07-28 PROCEDURE — 36415 COLL VENOUS BLD VENIPUNCTURE: CPT

## 2020-08-24 ENCOUNTER — APPOINTMENT (OUTPATIENT)
Dept: GENERAL RADIOLOGY | Age: 85
End: 2020-08-24
Payer: MEDICARE

## 2020-08-24 ENCOUNTER — APPOINTMENT (OUTPATIENT)
Dept: CT IMAGING | Age: 85
End: 2020-08-24
Payer: MEDICARE

## 2020-08-24 ENCOUNTER — HOSPITAL ENCOUNTER (EMERGENCY)
Age: 85
Discharge: HOME | End: 2020-08-25
Payer: MEDICARE

## 2020-08-24 VITALS
HEART RATE: 85 BPM | DIASTOLIC BLOOD PRESSURE: 71 MMHG | BODY MASS INDEX: 22.14 KG/M2 | WEIGHT: 125 LBS | SYSTOLIC BLOOD PRESSURE: 155 MMHG | OXYGEN SATURATION: 95 % | RESPIRATION RATE: 17 BRPM | TEMPERATURE: 98.6 F

## 2020-08-24 PROCEDURE — 6370000000 HC RX 637 (ALT 250 FOR IP): Performed by: PHYSICIAN ASSISTANT

## 2020-08-24 PROCEDURE — 73502 X-RAY EXAM HIP UNI 2-3 VIEWS: CPT

## 2020-08-24 PROCEDURE — 73700 CT LOWER EXTREMITY W/O DYE: CPT

## 2020-08-24 PROCEDURE — 99283 EMERGENCY DEPT VISIT LOW MDM: CPT

## 2020-08-24 PROCEDURE — 73564 X-RAY EXAM KNEE 4 OR MORE: CPT

## 2020-08-24 RX ORDER — HYDROCODONE BITARTRATE AND ACETAMINOPHEN 5; 325 MG/1; MG/1
1 TABLET ORAL ONCE
Status: COMPLETED | OUTPATIENT
Start: 2020-08-24 | End: 2020-08-24

## 2020-08-24 RX ORDER — DOCUSATE SODIUM 100 MG/1
100 CAPSULE, LIQUID FILLED ORAL 2 TIMES DAILY PRN
Qty: 30 CAPSULE | Refills: 0 | Status: SHIPPED | OUTPATIENT
Start: 2020-08-24

## 2020-08-24 RX ORDER — HYDROCODONE BITARTRATE AND ACETAMINOPHEN 5; 325 MG/1; MG/1
.5-1 TABLET ORAL EVERY 6 HOURS PRN
Qty: 10 TABLET | Refills: 0 | Status: SHIPPED | OUTPATIENT
Start: 2020-08-24 | End: 2020-08-27

## 2020-08-24 RX ADMIN — HYDROCODONE BITARTRATE AND ACETAMINOPHEN 1 TABLET: 5; 325 TABLET ORAL at 21:23

## 2020-08-24 ASSESSMENT — PAIN SCALES - GENERAL
PAINLEVEL_OUTOF10: 8
PAINLEVEL_OUTOF10: 8

## 2020-08-25 ASSESSMENT — PAIN SCALES - GENERAL: PAINLEVEL_OUTOF10: 2

## 2020-08-25 NOTE — ED NOTES
Bed: 01  Expected date:   Expected time:   Means of arrival:   Comments:  [id number] Ankur Kelly, RN  08/24/20 2052

## 2020-08-25 NOTE — ED NOTES
Knee immobilizer placed on left knee, discharge instructions given, patient acknowledged understanding, rx given x2 and outpt order, patient was taken to car by wheelchair     Estefania Bhakta RN  08/25/20 [id number]

## 2020-08-25 NOTE — ED PROVIDER NOTES
[address] [facility]        Pt Name: Nava Zamudio  MRN: [id number]  [place] 1934  Date of evaluation: 8/24/2020  Provider: Cristi Segovia PA-C  PCP: Fawn Arellano MD    Evaluation by FABIENNE. My supervising physician was available for consultation. CHIEF COMPLAINT       Chief Complaint   Patient presents with    Knee Pain     pt states having pain to right knee and groin that started yesterday. denies any injury. pt states pain worse with movement. HISTORY OF PRESENT ILLNESS   (Location, Timing/Onset, Context/Setting, Quality, Duration, Modifying Factors, Severity, Associated Signs and Symptoms)  Note limiting factors. Nava Zamudio is a 80 y.o. female that presents the emergency department with a chief complaint of some left knee pain and left groin pain and some mild increase of her chronic left leg swelling. She states that she woke up with this pain yesterday. She denies any injury or trauma. She states that she does take some type of pain tablet at home for osteoarthritis. She is also on aspirin and Plavix due to previous history of CVA. She is still been able to ambulate slowly at home. She denies any recent immobilization or being bedridden, history of DVTs or PEs, recent long trip or travel, recent surgery or estrogen replacement or hormone use. Denies nausea, vomiting, known fevers, rash, color change, numbness, chest pain, shortness breath, abdominal pain, urinary or bowel symptoms. Rates the pain currently an 8 out of 10. States the pain is worse with movement. Nursing Notes were all reviewed and agreed with or any disagreements were addressed in the HPI. REVIEW OF SYSTEMS    (2-9 systems for level 4, 10 or more for level 5)     Review of Systems    Positives and Pertinent negatives as per HPI. Except as noted above in the ROS, all other systems were reviewed and negative.        PAST MEDICAL HISTORY     Past Medical History:   Diagnosis Date    Arthritis     CAD (coronary artery disease)     GERD (gastroesophageal reflux disease)     Hx of blood clots     phlebitis    Hyperlipidemia     Hypertension          SURGICAL HISTORY     Past Surgical History:   Procedure Laterality Date    APPENDECTOMY      BREAST SURGERY      cyst removal    CHOLECYSTECTOMY      ENDOSCOPY, COLON, DIAGNOSTIC      egd w/ dilitation    EYE SURGERY      cataract, bilat    JOINT REPLACEMENT      right shoulder    VARICOSE VEIN SURGERY           CURRENTMEDICATIONS       Previous Medications    AMLODIPINE (NORVASC) 10 MG TABLET    Take 1 tablet by mouth daily    ASPIRIN 81 MG EC TABLET    Take 1 tablet by mouth daily    ATORVASTATIN (LIPITOR) 40 MG TABLET    Take 1 tablet by mouth nightly    BIOTIN 1000 MCG TABS    Take 1,000 mcg by mouth Daily with supper    CALCIUM CARBONATE (OSCAL) 500 MG TABS TABLET    Take 500 mg by mouth daily    CLOPIDOGREL (PLAVIX) 75 MG TABLET    Take 1 tablet by mouth daily    ESCITALOPRAM (LEXAPRO) 5 MG TABLET    Take 1 tablet by mouth daily    FAMOTIDINE (PEPCID) 20 MG TABLET    Take 1 tablet by mouth 2 times daily    LOSARTAN (COZAAR) 100 MG TABLET    Take 100 mg by mouth daily    MULTIPLE VITAMINS-MINERALS (MULTIVITAMIN ADULT PO)    Take by mouth    POTASSIUM CHLORIDE (KLOR-CON M) 20 MEQ EXTENDED RELEASE TABLET    Take 1 tablet by mouth 3 times daily         ALLERGIES     Amoxicillin; Bupivacaine hcl; Lidocaine; Iodides; Ketamine; Lisinopril; Sulfa antibiotics; Triamcinolone; and Iohexol    FAMILYHISTORY     History reviewed. No pertinent family history.        SOCIAL HISTORY       Social History     Tobacco Use    Smoking status: Never Smoker    Smokeless tobacco: Never Used   Substance Use Topics    Alcohol use: No    Drug use: No       SCREENINGS             PHYSICAL EXAM    (up to 7 for level 4, 8 or more for level 5)     ED Triage Vitals [08/24/20 1931]   BP Temp Temp Source Pulse Resp SpO2 Height Weight   (!) 155/71 98.6 °F (37 °C) Temporal 85 17 95 % -- 125 lb (56.7 kg)       Physical Exam  Vitals signs and nursing note reviewed. Constitutional:       Appearance: She is well-developed. She is not diaphoretic. HENT:      Head: Atraumatic. Nose: Nose normal.   Eyes:      General:         Right eye: No discharge. Left eye: No discharge. Neck:      Musculoskeletal: Normal range of motion. Cardiovascular:      Rate and Rhythm: Normal rate and regular rhythm. Pulses: Normal pulses. Heart sounds: No murmur. No friction rub. No gallop. Comments: 2+ dorsal pedal pulse in left foot. Pulmonary:      Effort: Pulmonary effort is normal. No respiratory distress. Breath sounds: No stridor. No wheezing, rhonchi or rales. Abdominal:      General: Bowel sounds are normal. There is no distension. Palpations: Abdomen is soft. There is no mass. Tenderness: There is no abdominal tenderness. There is no guarding or rebound. Hernia: No hernia is present. Musculoskeletal: Normal range of motion. General: Tenderness present. No swelling. Left lower leg: Edema present. Comments: Some generalized tenderness around the left knee with some tenderness over the left inguinal region. Decreased range of motion with flexion of left knee and left hip secondary to pain. Full range of motion of left ankle. 2+ pretibial and pedal pitting edema in the left lower leg. No joint warmth, erythema or rash. Skin:     General: Skin is warm and dry. Findings: No erythema or rash. Neurological:      Mental Status: She is alert and oriented to person, place, and time. Cranial Nerves: No cranial nerve deficit. Psychiatric:         Behavior: Behavior normal.         DIAGNOSTIC RESULTS   LABS:    Labs Reviewed - No data to display    All other labs were within normal range or not returned as of this dictation. EKG:  All EKG's are interpreted by the Emergency Department Physician in the absence of a cardiologist.  Please see their note for interpretation of EKG. RADIOLOGY:   Non-plain film images such as CT, Ultrasound and MRI are read by the radiologist. Plain radiographic images are visualized and preliminarily interpreted by the ED Provider with the below findings:        Interpretation per the Radiologist below, if available at the time of this note:    CT KNEE LEFT WO CONTRAST   Final Result   1. No evidence of left hip fracture. 2. Questionable nondisplaced patellar fracture. There is a large   suprapatellar joint effusion which may support an acute injury. Recommend   correlation with history and physical exam.  If there is additional concern,   MRI could be performed to evaluate for acute edema. CT HIP LEFT WO CONTRAST   Final Result   1. No evidence of left hip fracture. 2. Questionable nondisplaced patellar fracture. There is a large   suprapatellar joint effusion which may support an acute injury. Recommend   correlation with history and physical exam.  If there is additional concern,   MRI could be performed to evaluate for acute edema. XR KNEE LEFT (MIN 4 VIEWS)   Final Result   Osteopenia limits detection of subtle abnormalities. Lucency through the region of the patella concerning for nondisplaced   fracture left knee. Moderate joint effusion. Incidental note of chondrocalcinosis favored to be degenerative in nature,   crystal deposition disorder also within the differential.      Lucencies overlying the greater trochanter which are indeterminate and may   represent overlying artifact versus nondisplaced fractures recommend   follow-up CT. XR HIP LEFT (2-3 VIEWS)   Final Result   Osteopenia limits detection of subtle abnormalities. Lucency through the region of the patella concerning for nondisplaced   fracture left knee. Moderate joint effusion.       Incidental note of chondrocalcinosis favored to be degenerative in nature,   crystal deposition disorder also within the differential.      Lucencies overlying the greater trochanter which are indeterminate and may   represent overlying artifact versus nondisplaced fractures recommend   follow-up CT. VL Extremity Venous Left    (Results Pending)            PROCEDURES   Unless otherwise noted below, none     Procedures    CRITICAL CARE TIME   N/A    CONSULTS:  None      EMERGENCY DEPARTMENT COURSE and DIFFERENTIAL DIAGNOSIS/MDM:   Vitals:    Vitals:    08/24/20 1931   BP: (!) 155/71   Pulse: 85   Resp: 17   Temp: 98.6 °F (37 °C)   TempSrc: Temporal   SpO2: 95%   Weight: 125 lb (56.7 kg)       Patient was given the following medications:  Medications   HYDROcodone-acetaminophen (NORCO) 5-325 MG per tablet 1 tablet (1 tablet Oral Given 8/24/20 2123)           Patient presented with some left leg pain specifically around the left knee. She is some generalized tenderness around the left knee mostly over the anterior aspect with some mild swelling but no joint warmth. Also some tenderness over the left inguinal region and anterior hip. X-ray imaging is questionable for patellar fracture and greater trochanter fracture so CT imaging was obtained. There is no evidence of acute fracture of the left hip and she has a questionable nondisplaced fracture of the patella with suprapatellar joint effusion. She does not believe she injured herself but most of her pain is over the anterior aspect of the left knee. She has a walker and a cane at home. We placed a knee immobilizer. Follow-up with orthopedics. Will be written for outpatient ultrasound for possible DVT but low suspicion for this. She is already on aspirin and Plavix I do not believe any further anticoagulation is warranted at this time. Low suspicion for septic arthritis, cellulitis, arterial occlusion, compartment syndrome or other emergent etiology.   Patient will

## 2020-08-28 ENCOUNTER — OFFICE VISIT (OUTPATIENT)
Dept: ORTHOPEDIC SURGERY | Age: 85
End: 2020-08-28
Payer: MEDICARE

## 2020-08-28 VITALS — WEIGHT: 135 LBS | TEMPERATURE: 98.1 F | BODY MASS INDEX: 23.92 KG/M2 | HEIGHT: 63 IN

## 2020-08-28 PROCEDURE — G8420 CALC BMI NORM PARAMETERS: HCPCS | Performed by: ORTHOPAEDIC SURGERY

## 2020-08-28 PROCEDURE — 1090F PRES/ABSN URINE INCON ASSESS: CPT | Performed by: ORTHOPAEDIC SURGERY

## 2020-08-28 PROCEDURE — G8400 PT W/DXA NO RESULTS DOC: HCPCS | Performed by: ORTHOPAEDIC SURGERY

## 2020-08-28 PROCEDURE — 1036F TOBACCO NON-USER: CPT | Performed by: ORTHOPAEDIC SURGERY

## 2020-08-28 PROCEDURE — 4040F PNEUMOC VAC/ADMIN/RCVD: CPT | Performed by: ORTHOPAEDIC SURGERY

## 2020-08-28 PROCEDURE — G8427 DOCREV CUR MEDS BY ELIG CLIN: HCPCS | Performed by: ORTHOPAEDIC SURGERY

## 2020-08-28 PROCEDURE — 99203 OFFICE O/P NEW LOW 30 MIN: CPT | Performed by: ORTHOPAEDIC SURGERY

## 2020-08-28 PROCEDURE — 1123F ACP DISCUSS/DSCN MKR DOCD: CPT | Performed by: ORTHOPAEDIC SURGERY

## 2020-08-28 RX ORDER — HYDROCODONE BITARTRATE AND ACETAMINOPHEN 5; 325 MG/1; MG/1
0.5 TABLET ORAL EVERY 6 HOURS PRN
COMMUNITY

## 2020-08-28 RX ORDER — METHYLPREDNISOLONE 4 MG/1
TABLET ORAL
Qty: 1 KIT | Refills: 0 | Status: SHIPPED | OUTPATIENT
Start: 2020-08-28

## 2020-09-20 NOTE — PROGRESS NOTES
Date of Encounter: 8/28/2020  Patient Name:Hanna Montenegro  Medical Record Number: [id number]    Chief Complaint   Patient presents with    Knee Pain     Left       History of Present Illness: Ezra Gooden is a 80 y.o. female here for evaluation of her left knee. Her current symptoms are described below and I reviewed them with her today. Symptoms began about a week ago. She has had problems with both knees in the past as well as complaints of some pain in her left wrist.  No recent falls or direct trauma. She does have a history of Alzheimer's dementia and prior stroke. She is on chronic hydrocodone. Pain Assessment  Location of Pain: Knee  Location Modifiers: Left  Severity of Pain: 5  Quality of Pain: Aching  Duration of Pain: Persistent  Frequency of Pain: Intermittent  Date Pain First Started: 08/21/20  Aggravating Factors: Walking  Limiting Behavior: Yes  Relieving Factors: Rest, Other (Comment)(Norco)  Result of Injury: No  Work-Related Injury: No  Are there other pain locations you wish to document?: No    Past Medical History:   Diagnosis Date    Arthritis     CAD (coronary artery disease)     GERD (gastroesophageal reflux disease)     Hx of blood clots     phlebitis    Hyperlipidemia     Hypertension        Past Surgical History:   Procedure Laterality Date    APPENDECTOMY      BREAST SURGERY      cyst removal    CHOLECYSTECTOMY      ENDOSCOPY, COLON, DIAGNOSTIC      egd w/ dilitation    EYE SURGERY      cataract, bilat    JOINT REPLACEMENT      right shoulder    VARICOSE VEIN SURGERY         Current Outpatient Medications   Medication Sig Dispense Refill    HYDROcodone-acetaminophen (NORCO) 5-325 MG per tablet Take 0.5 tablets by mouth every 6 hours as needed for Pain.  methylPREDNISolone (MEDROL, TUAN,) 4 MG tablet Take by mouth as directed on package.  1 kit 0    docusate sodium (COLACE) 100 MG capsule Take 1 capsule by mouth 2 times daily as needed for Constipation 30 capsule 0    potassium chloride (KLOR-CON M) 20 MEQ extended release tablet Take 1 tablet by mouth 3 times daily 60 tablet 0    aspirin 81 MG EC tablet Take 1 tablet by mouth daily 30 tablet 3    escitalopram (LEXAPRO) 5 MG tablet Take 1 tablet by mouth daily 30 tablet 3    amLODIPine (NORVASC) 10 MG tablet Take 1 tablet by mouth daily 30 tablet 3    famotidine (PEPCID) 20 MG tablet Take 1 tablet by mouth 2 times daily 60 tablet 3    clopidogrel (PLAVIX) 75 MG tablet Take 1 tablet by mouth daily 30 tablet 3    atorvastatin (LIPITOR) 40 MG tablet Take 1 tablet by mouth nightly 30 tablet 3    Biotin 1000 MCG TABS Take 1,000 mcg by mouth Daily with supper      losartan (COZAAR) 100 MG tablet Take 100 mg by mouth daily      calcium carbonate (OSCAL) 500 MG TABS tablet Take 500 mg by mouth daily      Multiple Vitamins-Minerals (MULTIVITAMIN ADULT PO) Take by mouth       No current facility-administered medications for this visit. allergies, social and family histories were reviewed and updated as appropriate. Review of Systems:  Relevant review of systems reviewed and available in the patient's chart and scanned in under the MEDIA tab today. Vital Signs:  Temp 98.1 °F (36.7 °C) (Infrared)   Ht 5' 3\" (1.6 m)   Wt 135 lb (61.2 kg)   BMI 23.91 kg/m²     General Exam:   Constitutional: She is adequately groomed with no evidence of malnutrition, obesity absent  Mental Status: She is oriented to time, place and person. Normal mentation and affect for age. Lymphatic: The lymphatic examination bilaterally reveals all areas to be without enlargement or induration. Vascular: Examination reveals no swelling or calf tenderness. Peripheral pulses are palpable and 2+. Neurological: She has good coordination and balance. There is no focal weakness or sensory deficit. Left Knee Examination:    Inspection:  Knee shows neutral alignment  Normal muscle bulk and tone for her age and activity level.   No erythema. Compared to her ER x-rays her effusion is mild and resolving. Palpation: Tenderness along the joint retinaculum medially and laterally. No significant warmth to palpation. No point tenderness over her patella. Range of Motion: 5 to 105 with mild pain    Strength:  Quad 4/ 5  ; Hamstrings 4/ 5. Gross motor to hip and ankle intact, no pain with logroll the hip. Stinchfield examination negative. Special Tests:      negative anterior drawer    no posterior sag    no posterior drawer   Negative patellar grind.  does not open to valgus or varus stress at 0 or 30°    negative Homans    Posterior tibial pulses are +2/4 capillary refill is brisk sensation is intact. Skin: There are no rashes, ulcerations or lesions. Radiology:     X-rays obtained 8/24/2020  EXAMINATION:    FOUR XRAY VIEWS OF THE LEFT KNEE; TWO XRAY VIEWS OF THE LEFT HIP         8/24/2020 9:33 pm         COMPARISON:    None         HISTORY:    ORDERING SYSTEM PROVIDED HISTORY: pain    TECHNOLOGIST PROVIDED HISTORY:    Reason for exam:->pain    Reason for Exam: Knee Pain (pt states having pain to right knee and groin    that started yesterday. denies any injury. pt states pain worse with    movement. )    Acuity: Acute    Type of Exam: Initial         FINDINGS:    Left knee:         Study limited by diffuse osteopenia         Oblique image demonstrates a linear lucency at the patella.  Findings could    be artifact or nondisplaced fracture.  There is a moderate joint effusion.     Chondrocalcinosis is noted.         Left hip:         Study is limited secondary to diffuse osteopenia         Lucencies project over the intratrochanteric region which are indeterminate    but concerning for possible fracture.  Advanced degenerative changes noted in    limited imaging of lumbar spine.         Soft tissues are grossly unremarkable.              Impression    Osteopenia limits detection of subtle abnormalities.         Lucency through the region of the patella concerning for nondisplaced    fracture left knee.         Moderate joint effusion.         Incidental note of chondrocalcinosis favored to be degenerative in nature,    crystal deposition disorder also within the differential.         Lucencies overlying the greater trochanter which are indeterminate and may    represent overlying artifact versus nondisplaced fractures recommend    follow-up CT. EXAMINATION:    CT OF THE LEFT HIP WITHOUT CONTRAST; CT OF THE LEFT KNEE WITHOUT CONTRAST    8/24/2020 10:42 pm         TECHNIQUE:    CT of the left hip and knee was performed without the administration of    intravenous contrast.  Multiplanar reformatted images are provided for    review. Dose modulation, iterative reconstruction, and/or weight based    adjustment of the mA/kV was utilized to reduce the radiation dose to as low    as reasonably achievable.         COMPARISON:    Plain film studies on the same date         HISTORY    ORDERING SYSTEM PROVIDED HISTORY: pain    TECHNOLOGIST PROVIDED HISTORY:    Reason for exam:->pain    Reason for Exam: pain    Acuity: Acute    Type of Exam: Initial         FINDINGS:    Left hip: No acute fracture or dislocation.  Questionable lucencies over the    greater trochanter likely represent degenerative change and nutrient vascular    channels.  There is no evidence of acute injury of the left hip or visualized    femur.  Limited visualization of the left pelvis shows no acute abnormality. There is prominent osteopenia of the sacrum and of the iliac bones on either    side of the SI joints.  No insufficiency fracture appreciated.         Left knee: There is a subtle linear lucency extending through the anterior    cortex of the patella best appreciated on series 6 image 32.  There is    another questionable lucency involving the body of the patella on image 37.     Nondisplaced patellar fracture can not be excluded. Omayra Zambrano is a large suprapatellar joint effusion which may indicate acute traumatic injury.  The    possibility of trabecular striations or small vascular channel can not be    excluded.         Other: Fibroid uterus.  Low position of the right kidney partially visualized    in the field of view of the pelvic CT.              Impression    1. No evidence of left hip fracture. 2. Questionable nondisplaced patellar fracture. Jaxon Matute is a large    suprapatellar joint effusion which may support an acute injury.  Recommend    correlation with history and physical exam.  If there is additional concern,    MRI could be performed to evaluate for acute edema. Impression:  Encounter Diagnoses   Name Primary?  Acute pain of left knee Yes    Chondrocalcinosis of left knee        Treatment Plan:   We discussed treatment options. After reviewing her symptoms, lack of trauma and her current physical exam I believe that her biggest issue is a flare of her chondrocalcinosis/pseudogout in the left knee. She is also experiencing some left wrist pain and swelling and it is not uncommon to have this in multiple joints. I do not see evidence of a true patellar fracture that would require stabilization or surgical intervention. She can discontinue her knee immobilizer brace. We will start a Medrol Dosepak to see if this can help relieve her overall inflammation of her left knee and her left wrist.  I will have her follow-up in 3 weeks to check her progress. If her effusion recurs I would recommend aspiration to check for crystals. She could also benefit from cortisone injection at her next visit if her symptoms are not improving.     Hanna Montenegro understands and accepts this course of care

## 2020-11-03 PROBLEM — I10 ESSENTIAL HYPERTENSION: Status: RESOLVED | Noted: 2020-04-18 | Resolved: 2020-11-03

## 2021-10-01 ENCOUNTER — APPOINTMENT (OUTPATIENT)
Dept: GENERAL RADIOLOGY | Age: 86
End: 2021-10-01
Payer: MEDICARE

## 2021-10-01 ENCOUNTER — HOSPITAL ENCOUNTER (EMERGENCY)
Age: 86
Discharge: HOME | End: 2021-10-01
Attending: EMERGENCY MEDICINE
Payer: MEDICARE

## 2021-10-01 VITALS
TEMPERATURE: 98.1 F | HEART RATE: 70 BPM | HEIGHT: 62 IN | RESPIRATION RATE: 18 BRPM | DIASTOLIC BLOOD PRESSURE: 77 MMHG | BODY MASS INDEX: 25.1 KG/M2 | WEIGHT: 136.4 LBS | SYSTOLIC BLOOD PRESSURE: 171 MMHG | OXYGEN SATURATION: 97 %

## 2021-10-01 DIAGNOSIS — M54.50 RIGHT LOW BACK PAIN, UNSPECIFIED CHRONICITY, UNSPECIFIED WHETHER SCIATICA PRESENT: Primary | ICD-10-CM

## 2021-10-01 LAB
A/G RATIO: 1.8 (ref 1.1–2.2)
ALBUMIN SERPL-MCNC: 4.1 G/DL (ref 3.4–5)
ALP BLD-CCNC: 85 U/L (ref 40–129)
ALT SERPL-CCNC: 9 U/L (ref 10–40)
ANION GAP SERPL CALCULATED.3IONS-SCNC: 12 MMOL/L (ref 3–16)
AST SERPL-CCNC: 21 U/L (ref 15–37)
BASOPHILS ABSOLUTE: 0 K/UL (ref 0–0.2)
BASOPHILS RELATIVE PERCENT: 0.3 %
BILIRUB SERPL-MCNC: 1.5 MG/DL (ref 0–1)
BILIRUBIN URINE: ABNORMAL
BLOOD, URINE: NEGATIVE
BUN BLDV-MCNC: 12 MG/DL (ref 7–20)
CALCIUM SERPL-MCNC: 9.4 MG/DL (ref 8.3–10.6)
CHLORIDE BLD-SCNC: 103 MMOL/L (ref 99–110)
CLARITY: ABNORMAL
CO2: 25 MMOL/L (ref 21–32)
COLOR: YELLOW
CREAT SERPL-MCNC: 0.7 MG/DL (ref 0.6–1.2)
EKG ATRIAL RATE: 67 BPM
EKG DIAGNOSIS: NORMAL
EKG P AXIS: 43 DEGREES
EKG P-R INTERVAL: 152 MS
EKG Q-T INTERVAL: 436 MS
EKG QRS DURATION: 108 MS
EKG QTC CALCULATION (BAZETT): 460 MS
EKG R AXIS: -43 DEGREES
EKG T AXIS: -16 DEGREES
EKG VENTRICULAR RATE: 67 BPM
EOSINOPHILS ABSOLUTE: 0.1 K/UL (ref 0–0.6)
EOSINOPHILS RELATIVE PERCENT: 1.3 %
GFR AFRICAN AMERICAN: >60
GFR NON-AFRICAN AMERICAN: >60
GLOBULIN: 2.3 G/DL
GLUCOSE BLD-MCNC: 102 MG/DL (ref 70–99)
GLUCOSE URINE: NEGATIVE MG/DL
HCT VFR BLD CALC: 43.8 % (ref 36–48)
HEMOGLOBIN: 14.6 G/DL (ref 12–16)
KETONES, URINE: 15 MG/DL
LACTIC ACID, SEPSIS: 0.9 MMOL/L (ref 0.4–1.9)
LEUKOCYTE ESTERASE, URINE: NEGATIVE
LYMPHOCYTES ABSOLUTE: 1.3 K/UL (ref 1–5.1)
LYMPHOCYTES RELATIVE PERCENT: 22.7 %
MCH RBC QN AUTO: 29 PG (ref 26–34)
MCHC RBC AUTO-ENTMCNC: 33.2 G/DL (ref 31–36)
MCV RBC AUTO: 87.3 FL (ref 80–100)
MICROSCOPIC EXAMINATION: ABNORMAL
MONOCYTES ABSOLUTE: 0.2 K/UL (ref 0–1.3)
MONOCYTES RELATIVE PERCENT: 3.5 %
NEUTROPHILS ABSOLUTE: 4.3 K/UL (ref 1.7–7.7)
NEUTROPHILS RELATIVE PERCENT: 72.2 %
NITRITE, URINE: NEGATIVE
PDW BLD-RTO: 13.4 % (ref 12.4–15.4)
PH UA: 6 (ref 5–8)
PLATELET # BLD: 214 K/UL (ref 135–450)
PMV BLD AUTO: 8.4 FL (ref 5–10.5)
POTASSIUM SERPL-SCNC: 3.4 MMOL/L (ref 3.5–5.1)
PROTEIN UA: NEGATIVE MG/DL
RBC # BLD: 5.02 M/UL (ref 4–5.2)
SODIUM BLD-SCNC: 140 MMOL/L (ref 136–145)
SPECIFIC GRAVITY UA: 1.02 (ref 1–1.03)
TOTAL PROTEIN: 6.4 G/DL (ref 6.4–8.2)
TROPONIN: <0.01 NG/ML
URINE REFLEX TO CULTURE: ABNORMAL
URINE TYPE: ABNORMAL
UROBILINOGEN, URINE: 1 E.U./DL
WBC # BLD: 5.9 K/UL (ref 4–11)

## 2021-10-01 PROCEDURE — 84484 ASSAY OF TROPONIN QUANT: CPT

## 2021-10-01 PROCEDURE — 81003 URINALYSIS AUTO W/O SCOPE: CPT

## 2021-10-01 PROCEDURE — 99284 EMERGENCY DEPT VISIT MOD MDM: CPT

## 2021-10-01 PROCEDURE — U0005 INFEC AGEN DETEC AMPLI PROBE: HCPCS

## 2021-10-01 PROCEDURE — 73502 X-RAY EXAM HIP UNI 2-3 VIEWS: CPT

## 2021-10-01 PROCEDURE — 72100 X-RAY EXAM L-S SPINE 2/3 VWS: CPT

## 2021-10-01 PROCEDURE — 2580000003 HC RX 258: Performed by: EMERGENCY MEDICINE

## 2021-10-01 PROCEDURE — U0003 INFECTIOUS AGENT DETECTION BY NUCLEIC ACID (DNA OR RNA); SEVERE ACUTE RESPIRATORY SYNDROME CORONAVIRUS 2 (SARS-COV-2) (CORONAVIRUS DISEASE [COVID-19]), AMPLIFIED PROBE TECHNIQUE, MAKING USE OF HIGH THROUGHPUT TECHNOLOGIES AS DESCRIBED BY CMS-2020-01-R: HCPCS

## 2021-10-01 PROCEDURE — 80053 COMPREHEN METABOLIC PANEL: CPT

## 2021-10-01 PROCEDURE — 85025 COMPLETE CBC W/AUTO DIFF WBC: CPT

## 2021-10-01 PROCEDURE — 93005 ELECTROCARDIOGRAM TRACING: CPT | Performed by: EMERGENCY MEDICINE

## 2021-10-01 PROCEDURE — 6370000000 HC RX 637 (ALT 250 FOR IP): Performed by: EMERGENCY MEDICINE

## 2021-10-01 PROCEDURE — 83605 ASSAY OF LACTIC ACID: CPT

## 2021-10-01 PROCEDURE — 71045 X-RAY EXAM CHEST 1 VIEW: CPT

## 2021-10-01 PROCEDURE — 93010 ELECTROCARDIOGRAM REPORT: CPT | Performed by: INTERNAL MEDICINE

## 2021-10-01 PROCEDURE — 36415 COLL VENOUS BLD VENIPUNCTURE: CPT

## 2021-10-01 RX ORDER — MIRABEGRON 25 MG/1
TABLET, FILM COATED, EXTENDED RELEASE ORAL
COMMUNITY
Start: 2021-09-23

## 2021-10-01 RX ORDER — ATORVASTATIN CALCIUM 40 MG/1
TABLET, FILM COATED ORAL
COMMUNITY
Start: 2021-08-16

## 2021-10-01 RX ORDER — ACETAMINOPHEN 325 MG/1
650 TABLET ORAL ONCE
Status: COMPLETED | OUTPATIENT
Start: 2021-10-01 | End: 2021-10-01

## 2021-10-01 RX ORDER — HYDROCODONE BITARTRATE AND ACETAMINOPHEN 5; 325 MG/1; MG/1
1 TABLET ORAL EVERY 8 HOURS PRN
Qty: 5 TABLET | Refills: 0 | Status: SHIPPED | OUTPATIENT
Start: 2021-10-01 | End: 2021-10-04

## 2021-10-01 RX ORDER — DONEPEZIL HYDROCHLORIDE 5 MG/1
TABLET, FILM COATED ORAL
COMMUNITY
Start: 2021-09-13

## 2021-10-01 RX ORDER — AMLODIPINE BESYLATE 10 MG/1
TABLET ORAL
COMMUNITY
Start: 2021-07-21

## 2021-10-01 RX ORDER — FAMOTIDINE 40 MG/1
TABLET, FILM COATED ORAL
COMMUNITY
Start: 2021-09-15

## 2021-10-01 RX ORDER — LOSARTAN POTASSIUM 100 MG/1
TABLET ORAL
COMMUNITY
Start: 2021-09-14

## 2021-10-01 RX ORDER — 0.9 % SODIUM CHLORIDE 0.9 %
1000 INTRAVENOUS SOLUTION INTRAVENOUS ONCE
Status: COMPLETED | OUTPATIENT
Start: 2021-10-01 | End: 2021-10-01

## 2021-10-01 RX ORDER — ONDANSETRON 4 MG/1
TABLET, FILM COATED ORAL
COMMUNITY
Start: 2021-09-29

## 2021-10-01 RX ORDER — CLOPIDOGREL BISULFATE 75 MG/1
TABLET ORAL
COMMUNITY
Start: 2021-09-13

## 2021-10-01 RX ORDER — LIDOCAINE 4 G/G
1 PATCH TOPICAL DAILY
Qty: 10 PATCH | Refills: 0 | Status: SHIPPED | OUTPATIENT
Start: 2021-10-01 | End: 2021-10-11

## 2021-10-01 RX ADMIN — ACETAMINOPHEN 650 MG: 325 TABLET ORAL at 14:43

## 2021-10-01 RX ADMIN — SODIUM CHLORIDE 1000 ML: 9 INJECTION, SOLUTION INTRAVENOUS at 14:45

## 2021-10-01 ASSESSMENT — PAIN DESCRIPTION - DESCRIPTORS
DESCRIPTORS: ACHING
DESCRIPTORS: ACHING

## 2021-10-01 ASSESSMENT — PAIN DESCRIPTION - PAIN TYPE
TYPE: ACUTE PAIN
TYPE: ACUTE PAIN

## 2021-10-01 ASSESSMENT — PAIN SCALES - GENERAL
PAINLEVEL_OUTOF10: 2
PAINLEVEL_OUTOF10: 4
PAINLEVEL_OUTOF10: 0
PAINLEVEL_OUTOF10: 4

## 2021-10-01 ASSESSMENT — PAIN DESCRIPTION - LOCATION
LOCATION: BACK
LOCATION: BACK

## 2021-10-01 NOTE — ED PROVIDER NOTES
[facility]      CHIEF COMPLAINT  Back Pain (low back pain )       HISTORY OF PRESENT ILLNESS  Cesar Morel is a 80 y.o. female  who presents to the ED for evaluation of right-sided lower back pain. Patient reports the symptoms started on Friday. Says at first, she was applying some topical pain relief medication which was helping with the pain. However, it got worse today which prompted the visit to the emergency department. Denies recalling any injuries. Reports pain is over the right lower back. Says she is able to ambulate. Reports no pain while at rest and pain when moving around or ambulating. Denies having any midline back pain. No associated saddle anesthesia, urinary or bowel incontinence. Denies falls. No headache. No neck pain. No weakness of lower extremities. Denies having dysuria, urinary urgency, or frequency. No other complaints, modifying factors or associated symptoms. I have reviewed the following from the nursing documentation. Past Medical History:   Diagnosis Date    Dementia ([address]     Hyperlipidemia     Hypertension      Past Surgical History:   Procedure Laterality Date    CHOLECYSTECTOMY       History reviewed. No pertinent family history.   Social History     Socioeconomic History    Marital status:      Spouse name: Not on file    Number of children: Not on file    Years of education: Not on file    Highest education level: Not on file   Occupational History    Not on file   Tobacco Use    Smoking status: Never Smoker    Smokeless tobacco: Never Used   Substance and Sexual Activity    Alcohol use: Never    Drug use: Never    Sexual activity: Not on file   Other Topics Concern    Not on file   Social History Narrative    Not on file     Social Determinants of Health     Financial Resource Strain:     Difficulty of Paying Living Expenses:    Food Insecurity:     Worried About Running Out of Food in the Last Year:  Ran Out of Food in the Last Year:    Transportation Needs:     Lack of Transportation (Medical):  Lack of Transportation (Non-Medical):    Physical Activity:     Days of Exercise per Week:     Minutes of Exercise per Session:    Stress:     Feeling of Stress :    Social Connections:     Frequency of Communication with Friends and Family:     Frequency of Social Gatherings with Friends and Family:     Attends Islam Services:     Active Member of Clubs or Organizations:     Attends Club or Organization Meetings:     Marital Status:    Intimate Partner Violence:     Fear of Current or Ex-Partner:     Emotionally Abused:     Physically Abused:     Sexually Abused:      No current facility-administered medications for this encounter. Current Outpatient Medications   Medication Sig Dispense Refill    amLODIPine (NORVASC) 10 MG tablet TAKE 1 TABLET BY MOUTH DAILY      atorvastatin (LIPITOR) 40 MG tablet TAKE 1 TABLET BY MOUTH DAILY      clopidogrel (PLAVIX) 75 MG tablet TAKE 1 TABLET BY MOUTH DAILY      donepezil (ARICEPT) 5 MG tablet TAKE 1 TABLET BY MOUTH AT BEDTIME      famotidine (PEPCID) 40 MG tablet TAKE 1 TABLET BY MOUTH TWICE DAILY      losartan (COZAAR) 100 MG tablet TAKE 1 TABLET BY MOUTH DAILY      MYRBETRIQ 25 MG TB24 TAKE 1 TABLET BY MOUTH EVERY DAY      ondansetron (ZOFRAN) 4 MG tablet TAKE 1 TABLET BY MOUTH EVERY 8 HOURS AS NEEDED      lidocaine 4 % external patch Place 1 patch onto the skin daily for 10 days 10 patch 0    HYDROcodone-acetaminophen (NORCO) 5-325 MG per tablet Take 1 tablet by mouth every 8 hours as needed for Pain for up to 3 days. Intended supply: 3 days. Take lowest dose possible to manage pain 5 tablet 0     No Known Allergies    REVIEW OF SYSTEMS  10 systems reviewed, pertinent positives per HPI otherwise noted to be negative.     PHYSICAL EXAM  BP (!) 171/77   Pulse 70   Temp 98.1 °F (36.7 °C)   Resp 18   Ht 5' 2\" (1.575 m)   Wt 136 lb 6.4 oz (61.9 kg)   SpO2 97%   BMI 24.95 kg/m²    GENERAL APPEARANCE: Awake and alert. No acute distress. HENT: Normocephalic. Atraumatic. PERRL. EOMI. No facial droop. HEART/CHEST: RRR. LUNGS: Respirations unlabored. Speaking comfortably in full sentences. ABDOMEN: Soft, non-distended abdomen. Non tender to palpation. No guarding. No rebound. BACK: no midline c/t/l spine stepoffs. Right sided lower lumbar tenderness. EXTREMITIES: No gross deformities. 5/5 strength of all extremities. Sensation intact to all extremities. SKIN: Warm and dry. No acute rashes. NEUROLOGICAL: Awake. No gross facial drooping. Answering questions appropriately. Moving all extremities. PSYCHIATRIC: Pleasant. Normal mood and affect.     LABS  Results for orders placed or performed during the hospital encounter of 10/01/21   Urinalysis Reflex to Culture    Specimen: Urine, clean catch   Result Value Ref Range    Color, UA Yellow Straw/Yellow    Clarity, UA SLCLOUDY Clear    Glucose, Ur Negative Negative mg/dL    Bilirubin Urine SMALL (A) Negative    Ketones, Urine 15 (A) Negative mg/dL    Specific Gravity, UA 1.020 1.005 - 1.030    Blood, Urine Negative Negative    pH, UA 6.0 5.0 - 8.0    Protein, UA Negative Negative mg/dL    Urobilinogen, Urine 1.0 <2.0 E.U./dL    Nitrite, Urine Negative Negative    Leukocyte Esterase, Urine Negative Negative    Microscopic Examination Not Indicated     Urine Type Voided     Urine Reflex to Culture Not Indicated    Comprehensive Metabolic Panel   Result Value Ref Range    Sodium 140 136 - 145 mmol/L    Potassium 3.4 (L) 3.5 - 5.1 mmol/L    Chloride 103 99 - 110 mmol/L    CO2 25 21 - 32 mmol/L    Anion Gap 12 3 - 16    Glucose 102 (H) 70 - 99 mg/dL    BUN 12 7 - 20 mg/dL    CREATININE 0.7 0.6 - 1.2 mg/dL    GFR Non-African American >60 >60    GFR African American >60 >60    Calcium 9.4 8.3 - 10.6 mg/dL    Total Protein 6.4 6.4 - 8.2 g/dL    Albumin 4.1 3.4 - 5.0 g/dL    Albumin/Globulin Ratio 1.8 1.1 - 2. 2    Total Bilirubin 1.5 (H) 0.0 - 1.0 mg/dL    Alkaline Phosphatase 85 40 - 129 U/L    ALT 9 (L) 10 - 40 U/L    AST 21 15 - 37 U/L    Globulin 2.3 g/dL   CBC Auto Differential   Result Value Ref Range    WBC 5.9 4.0 - 11.0 K/uL    RBC 5.02 4.00 - 5.20 M/uL    Hemoglobin 14.6 12.0 - 16.0 g/dL    Hematocrit 43.8 36.0 - 48.0 %    MCV 87.3 80.0 - 100.0 fL    MCH 29.0 26.0 - 34.0 pg    MCHC 33.2 31.0 - 36.0 g/dL    RDW 13.4 12.4 - 15.4 %    Platelets 381 745 - 538 K/uL    MPV 8.4 5.0 - 10.5 fL    Neutrophils % 72.2 %    Lymphocytes % 22.7 %    Monocytes % 3.5 %    Eosinophils % 1.3 %    Basophils % 0.3 %    Neutrophils Absolute 4.3 1.7 - 7.7 K/uL    Lymphocytes Absolute 1.3 1.0 - 5.1 K/uL    Monocytes Absolute 0.2 0.0 - 1.3 K/uL    Eosinophils Absolute 0.1 0.0 - 0.6 K/uL    Basophils Absolute 0.0 0.0 - 0.2 K/uL   Troponin   Result Value Ref Range    Troponin <0.01 <0.01 ng/mL   Lactate, Sepsis   Result Value Ref Range    Lactic Acid, Sepsis 0.9 0.4 - 1.9 mmol/L   COVID-19   Result Value Ref Range    SARS-CoV-2 Not Detected Not detected   EKG 12 Lead   Result Value Ref Range    Ventricular Rate 67 BPM    Atrial Rate 67 BPM    P-R Interval 152 ms    QRS Duration 108 ms    Q-T Interval 436 ms    QTc Calculation (Bazett) 460 ms    P Axis 43 degrees    R Axis -43 degrees    T Axis -16 degrees    Diagnosis       Normal sinus rhythmLeft axis deviationVoltage criteria for left ventricular hypertrophyAbnormal ECGNo previous ECGs availableConfirmed by Bhavana OWENS MD ([id number]) on 10/1/2021 4:38:07 PM       I have reviewed all labs for this visit. ECG  The Ekg interpreted by me shows  Normal sinus rhythm with a rate of 67  Axis is left  QTc is acceptable  Left ventricular atrophy  ST Segments: Nonspecific changes    RADIOLOGY  XR LUMBAR SPINE (2-3 VIEWS)    Result Date: 10/1/2021  EXAMINATION: THREE XRAY VIEWS OF THE LUMBAR SPINE 10/1/2021 1:30 pm COMPARISON: None.  HISTORY: ORDERING SYSTEM PROVIDED HISTORY: pain TECHNOLOGIST PROVIDED HISTORY: Reason for exam:->pain Reason for Exam: low back pain Acuity: Chronic Type of Exam: Initial FINDINGS: There is a prominent levoscoliotic curvature in the upper lumbar spine and dextroscoliosis of the lower lumbar spine. There is question of grade 1-2 L1 on L2 retrolisthesis. There are age indeterminate mild compression deformities of L2 and L3. There is multilevel degenerative disc disease with disc space narrowing, endplate changes and endplate osteophyte formation. There is moderate vascular calcification. There is nonspecific bowel gas pattern. There are surgical clips in the mid abdomen. Age indeterminate mild compression deformities of L2 and L3. S-shaped scoliosis of the lumbar spine. Question of grade 1-2 L1 on L2 retrolisthesis. Degenerative disc disease. XR HIP RIGHT (2-3 VIEWS)    Result Date: 10/1/2021  EXAMINATION: TWO XRAY VIEWS OF THE RIGHT HIP 10/1/2021 3:42 pm COMPARISON: None. HISTORY: ORDERING SYSTEM PROVIDED HISTORY: right hip pain TECHNOLOGIST PROVIDED HISTORY: Reason for exam:->right hip pain Reason for Exam: right hip pain with ambulation Acuity: Acute Type of Exam: Initial FINDINGS: No acute fracture or dislocation of the right hip. Moderate degenerative change is symmetric in both hip joints. Pelvic bones are intact. Partial visualization of degenerative change and levoscoliosis in the lumbar spine. There are no significant soft tissue findings. No acute finding of the right hip. XR CHEST PORTABLE    Result Date: 10/1/2021  EXAMINATION: ONE XRAY VIEW OF THE CHEST 10/1/2021 1:30 pm COMPARISON: None. HISTORY: ORDERING SYSTEM PROVIDED HISTORY: fall? TECHNOLOGIST PROVIDED HISTORY: Reason for exam:->fall? Reason for Exam: low back pain Acuity: Chronic Type of Exam: Initial FINDINGS: The cardiomediastinal silhouette is of normal size. The lungs are grossly clear. There is no pleural effusion. There is no pneumothorax.  There is no acute osseous abnormality. There are postoperative changes in the right shoulder. No acute cardiopulmonary disease. ED COURSE/MDM  Patient seen and evaluated. At presentation, patient was awake, alert, afebrile, hemodynamically stable, and satting well on room air. Patient was a poor historian. I spoke with her son over the phone who says patient has been complaining of lower back pain which prompted the visit to the emergency department. As far as he knows, no falls. However, patient lives at her residence with her  so he does not know for sure. Patient does h/o memory problems and was started on a dementia medication and had some loose stools. They have a follow up appointment with PCP on Monday. Reports no loose stools over the past few days since stopping the dementia medication. Differential diagnosis includes sciatica, lumbar strain, pyelonephritis, nephrolithiasis, among others. Patient denies having any pain at rest and only pain with movement, which makes me suspect that pain is musculoskeletal in etiology. Urinalysis is negative for urinary tract infection. Troponin is negative. Creatinine is normal.  Lactate is normal.  No leukocytosis present. Hemoglobin is normal at 14.6. Chest x-ray shows no acute pathology. Lumbar spine x-ray shows age-indeterminate mild compression deformities of L2 and L3 with scoliosis of the lumbar spine. This was discussed with patient and family. I again went into the room and palpated the middle of her spine, and patient denies having pain over the midline of the spine. She was able to ambulate without difficulty. However, at this time, patient reports her pain is over the lateral aspect of the right hip. X-ray of the right hip also obtained and was negative. I spoke about the results with the patient and the son over the phone and no concerns about discharge home. They will follow up with her PCP on Monday as scheduled.   I did discuss that pain medications can increase the risk of falls. However, son requested for patient to be sent home with pain medication in case she needs it. She was discharged home with [place].  Patient discharged home with strict return precautions. Pt was seen during the [place] 19 pandemic. Appropriate PPE worn by ME during patient encounters. Pt seen during a time with constrained hospital bed capacity and other potential inpatient and outpatient resources were constrained due to the viral pandemic. During the patient's ED course, the patient was given:  Medications   acetaminophen (TYLENOL) tablet 650 mg (650 mg Oral Given 10/1/21 1443)   0.9 % sodium chloride bolus (0 mLs IntraVENous Stopped 10/1/21 1700)        CLINICAL IMPRESSION  1. Right low back pain, unspecified chronicity, unspecified whether sciatica present        Blood pressure (!) 171/77, pulse 70, temperature 98.1 °F (36.7 °C), resp. rate 18, height 5' 2\" (1.575 m), weight 136 lb 6.4 oz (61.9 kg), SpO2 97 %. Sandra Gottlieb was discharged home in stable condition. Patient was given scripts for the following medications. I counseled patient how to take these medications. Discharge Medication List as of 10/1/2021  5:34 PM      START taking these medications    Details   lidocaine 4 % external patch Place 1 patch onto the skin daily for 10 days, TransDERmal, DAILY Starting Fri 10/1/2021, Until Mon 10/11/2021, For 10 days, Disp-10 patch, R-0, Print      HYDROcodone-acetaminophen (NORCO) 5-325 MG per tablet Take 1 tablet by mouth every 8 hours as needed for Pain for up to 3 days. Intended supply: 3 days. Take lowest dose possible to manage pain, Disp-5 tablet, R-0Print             Follow-up with:  [address] [place] [address] Road, MD  [address] [place] [address]  [phone]    In 2 days        DISCLAIMER: This chart was created using Dragon dictation software.   Efforts were made by me to ensure accuracy, however some errors may be present due to limitations

## 2021-10-01 NOTE — ED NOTES
Spoke to Cb Webster patient son regarding compression deformities. Patient son not aware.        Dallas Kelly, RN  10/01/21 1894 Karel Rahman RN  10/01/21 0342

## 2021-10-01 NOTE — ED NOTES
Discharge instructions reviewed with patient and family. Patient and family verbalized understanding. Prescription x2 sent with patient.        Carlita Segura RN  10/01/21 0156

## 2021-10-02 LAB — SARS-COV-2: NOT DETECTED

## 2024-03-31 ENCOUNTER — APPOINTMENT (OUTPATIENT)
Dept: GENERAL RADIOLOGY | Age: 89
End: 2024-03-31
Payer: MEDICARE

## 2024-03-31 ENCOUNTER — HOSPITAL ENCOUNTER (EMERGENCY)
Age: 89
Discharge: HOME | End: 2024-03-31
Attending: EMERGENCY MEDICINE
Payer: MEDICARE

## 2024-03-31 VITALS
OXYGEN SATURATION: 91 % | HEART RATE: 68 BPM | SYSTOLIC BLOOD PRESSURE: 150 MMHG | WEIGHT: 145 LBS | TEMPERATURE: 97.9 F | BODY MASS INDEX: 26.68 KG/M2 | RESPIRATION RATE: 15 BRPM | DIASTOLIC BLOOD PRESSURE: 79 MMHG | HEIGHT: 62 IN

## 2024-03-31 DIAGNOSIS — R79.89 ELEVATED TROPONIN: ICD-10-CM

## 2024-03-31 DIAGNOSIS — I45.10 RBBB: ICD-10-CM

## 2024-03-31 DIAGNOSIS — M79.602 LEFT ARM PAIN: Primary | ICD-10-CM

## 2024-03-31 LAB
ALBUMIN SERPL-MCNC: 4.2 G/DL (ref 3.4–5)
ALBUMIN/GLOB SERPL: 1.4 {RATIO} (ref 1.1–2.2)
ALP SERPL-CCNC: 95 U/L (ref 40–129)
ALT SERPL-CCNC: 17 U/L (ref 10–40)
ANION GAP SERPL CALCULATED.3IONS-SCNC: 10 MMOL/L (ref 3–16)
AST SERPL-CCNC: 33 U/L (ref 15–37)
BASOPHILS # BLD: 0.1 K/UL (ref 0–0.2)
BASOPHILS NFR BLD: 0.6 %
BILIRUB SERPL-MCNC: 2.2 MG/DL (ref 0–1)
BUN SERPL-MCNC: 18 MG/DL (ref 7–20)
CALCIUM SERPL-MCNC: 9.8 MG/DL (ref 8.3–10.6)
CHLORIDE SERPL-SCNC: 100 MMOL/L (ref 99–110)
CO2 SERPL-SCNC: 25 MMOL/L (ref 21–32)
CREAT SERPL-MCNC: 1 MG/DL (ref 0.6–1.2)
DEPRECATED RDW RBC AUTO: 17.6 % (ref 12.4–15.4)
EOSINOPHIL # BLD: 0 K/UL (ref 0–0.6)
EOSINOPHIL NFR BLD: 0 %
GFR SERPLBLD CREATININE-BSD FMLA CKD-EPI: 54 ML/MIN/{1.73_M2}
GLUCOSE SERPL-MCNC: 138 MG/DL (ref 70–99)
HCT VFR BLD AUTO: 40.8 % (ref 36–48)
HGB BLD-MCNC: 13.3 G/DL (ref 12–16)
LYMPHOCYTES # BLD: 1.6 K/UL (ref 1–5.1)
LYMPHOCYTES NFR BLD: 12.4 %
MCH RBC QN AUTO: 27.9 PG (ref 26–34)
MCHC RBC AUTO-ENTMCNC: 32.7 G/DL (ref 31–36)
MCV RBC AUTO: 85.3 FL (ref 80–100)
MONOCYTES # BLD: 1 K/UL (ref 0–1.3)
MONOCYTES NFR BLD: 7.5 %
NEUTROPHILS # BLD: 10.2 K/UL (ref 1.7–7.7)
NEUTROPHILS NFR BLD: 79.5 %
PLATELET # BLD AUTO: 234 K/UL (ref 135–450)
PLATELET BLD QL SMEAR: ADEQUATE
PMV BLD AUTO: 9.4 FL (ref 5–10.5)
POTASSIUM SERPL-SCNC: 4 MMOL/L (ref 3.5–5.1)
PROT SERPL-MCNC: 7.3 G/DL (ref 6.4–8.2)
RBC # BLD AUTO: 4.78 M/UL (ref 4–5.2)
SLIDE REVIEW: ABNORMAL
SODIUM SERPL-SCNC: 135 MMOL/L (ref 136–145)
TROPONIN, HIGH SENSITIVITY: 30 NG/L (ref 0–14)
TROPONIN, HIGH SENSITIVITY: 31 NG/L (ref 0–14)
WBC # BLD AUTO: 12.8 K/UL (ref 4–11)

## 2024-03-31 PROCEDURE — 6370000000 HC RX 637 (ALT 250 FOR IP): Performed by: PHYSICIAN ASSISTANT

## 2024-03-31 PROCEDURE — 84484 ASSAY OF TROPONIN QUANT: CPT

## 2024-03-31 PROCEDURE — 73030 X-RAY EXAM OF SHOULDER: CPT

## 2024-03-31 PROCEDURE — 73070 X-RAY EXAM OF ELBOW: CPT

## 2024-03-31 PROCEDURE — 93005 ELECTROCARDIOGRAM TRACING: CPT | Performed by: PHYSICIAN ASSISTANT

## 2024-03-31 PROCEDURE — 36415 COLL VENOUS BLD VENIPUNCTURE: CPT

## 2024-03-31 PROCEDURE — 85025 COMPLETE CBC W/AUTO DIFF WBC: CPT

## 2024-03-31 PROCEDURE — 71045 X-RAY EXAM CHEST 1 VIEW: CPT

## 2024-03-31 PROCEDURE — 80053 COMPREHEN METABOLIC PANEL: CPT

## 2024-03-31 PROCEDURE — 99285 EMERGENCY DEPT VISIT HI MDM: CPT

## 2024-03-31 RX ORDER — ONDANSETRON 4 MG/1
4 TABLET, ORALLY DISINTEGRATING ORAL ONCE
Status: COMPLETED | OUTPATIENT
Start: 2024-03-31 | End: 2024-03-31

## 2024-03-31 RX ORDER — HYDROCODONE BITARTRATE AND ACETAMINOPHEN 5; 325 MG/1; MG/1
1 TABLET ORAL ONCE
Status: COMPLETED | OUTPATIENT
Start: 2024-03-31 | End: 2024-03-31

## 2024-03-31 RX ADMIN — ONDANSETRON 4 MG: 4 TABLET, ORALLY DISINTEGRATING ORAL at 12:49

## 2024-03-31 RX ADMIN — HYDROCODONE BITARTRATE AND ACETAMINOPHEN 1 TABLET: 5; 325 TABLET ORAL at 12:49

## 2024-03-31 ASSESSMENT — PAIN DESCRIPTION - ORIENTATION: ORIENTATION: LEFT

## 2024-03-31 ASSESSMENT — PAIN SCALES - GENERAL: PAINLEVEL_OUTOF10: 5

## 2024-03-31 ASSESSMENT — PAIN DESCRIPTION - LOCATION: LOCATION: ARM

## 2024-03-31 ASSESSMENT — LIFESTYLE VARIABLES: HOW OFTEN DO YOU HAVE A DRINK CONTAINING ALCOHOL: NEVER

## 2024-03-31 ASSESSMENT — PAIN - FUNCTIONAL ASSESSMENT: PAIN_FUNCTIONAL_ASSESSMENT: 0-10

## 2024-03-31 NOTE — ED PROVIDER NOTES
[facility] Emergency Department      Pt Name: Hanna Ceballos  MRN: [id number]  Birthdate 10/11/1934  Date of evaluation: 3/31/2024  Provider: PERLITA RODRIGUEZ MD  I personally saw Hanna Ceballos and made and approved the management plan with the advanced practice provider.  I take responsibility for the patient management.     HPI: Hanna Ceballos presented with   Chief Complaint   Patient presents with    Arm Pain     Left arm pain. No known injury. 2 days. Known arthritis in neck.  Uses both arms to help herself up and out of bed.       Hanna Ceballos has a past medical history of Arthritis, CAD (coronary artery disease), Dementia (HCC), GERD (gastroesophageal reflux disease), blood clots, Hyperlipidemia, and Hypertension.  She has a past surgical history that includes Appendectomy; Varicose vein surgery; Endoscopy, colon, diagnostic; eye surgery; joint replacement; Breast surgery; and Cholecystectomy.    No current facility-administered medications on file prior to encounter.     Current Outpatient Medications on File Prior to Encounter   Medication Sig Dispense Refill    amLODIPine (NORVASC) 10 MG tablet TAKE 1 TABLET BY MOUTH DAILY      atorvastatin (LIPITOR) 40 MG tablet TAKE 1 TABLET BY MOUTH DAILY      clopidogrel (PLAVIX) 75 MG tablet TAKE 1 TABLET BY MOUTH DAILY      donepezil (ARICEPT) 5 MG tablet TAKE 1 TABLET BY MOUTH AT BEDTIME      famotidine (PEPCID) 40 MG tablet TAKE 1 TABLET BY MOUTH TWICE DAILY      losartan (COZAAR) 100 MG tablet TAKE 1 TABLET BY MOUTH DAILY      MYRBETRIQ 25 MG TB24 TAKE 1 TABLET BY MOUTH EVERY DAY      ondansetron (ZOFRAN) 4 MG tablet TAKE 1 TABLET BY MOUTH EVERY 8 HOURS AS NEEDED      HYDROcodone-acetaminophen (NORCO) 5-325 MG per tablet Take 0.5 tablets by mouth every 6 hours as needed for Pain.      methylPREDNISolone (MEDROL, TUAN,) 4 MG tablet Take by mouth as directed on package. 1 kit 0    docusate sodium (COLACE) 100 MG capsule Take 1 capsule by mouth 2 times 
  BP: (!) 132/58    Pulse: 59 69   Resp: 18 14   Temp: 97.9 °F (36.6 °C)    TempSrc: Oral    SpO2: 93% 93%   Weight: 65.8 kg (145 lb)    Height: 1.575 m (5' 2\")        Patient was given the following medications:  Medications   HYDROcodone-acetaminophen (NORCO) 5-325 MG per tablet 1 tablet (1 tablet Oral Given 3/31/24 1249)   ondansetron (ZOFRAN-ODT) disintegrating tablet 4 mg (4 mg Oral Given 3/31/24 1249)             Is this patient to be included in the SEP-1 Core Measure due to severe sepsis or septic shock?   No   Exclusion criteria - the patient is NOT to be included for SEP-1 Core Measure due to:  Infection is not suspected    Chronic Conditions affecting care:    has a past medical history of Arthritis, CAD (coronary artery disease), Dementia (HCC), GERD (gastroesophageal reflux disease), blood clots, Hyperlipidemia, and Hypertension.    CONSULTS: (Who and What was discussed)  None      Social Determinants Significantly Affecting Health : None    Records Reviewed (External and Source) None    CC/HPI Summary, DDx, ED Course, and Reassessment: 89-year-old female who presents ED with complaint of left arm pain.  Left arm pain that at times seems to be reproducible with movement and palpation but at times does not.  She has some baseline dementia and is a relatively poor historian.  IV was established and blood work obtained.  Troponin was 30.  Delta troponin 31.  No significant delta change but no previous high sensitive troponins for comparison.  CBC with white count of 12.8 with normal hemoglobin and platelets.  No signs of infection clinically.  CMP unremarkable.  Chest x-ray unremarkable.  EKG obtained interpreted by attending.  X-ray of the left shoulder and left elbow obtained and showed no acute abnormality.  Lengthy discussion with son and patient at bedside concerning high sensitive troponin and left arm pain which may be musculoskeletal but cannot rule out cardiac etiology at this time.  Patient would

## 2024-04-01 LAB
EKG ATRIAL RATE: 78 BPM
EKG DIAGNOSIS: NORMAL
EKG P-R INTERVAL: 128 MS
EKG Q-T INTERVAL: 490 MS
EKG QRS DURATION: 104 MS
EKG QTC CALCULATION (BAZETT): 558 MS
EKG R AXIS: -36 DEGREES
EKG T AXIS: 8 DEGREES
EKG VENTRICULAR RATE: 78 BPM

## 2024-04-02 LAB
EKG ATRIAL RATE: 70 BPM
EKG ATRIAL RATE: 78 BPM
EKG DIAGNOSIS: NORMAL
EKG DIAGNOSIS: NORMAL
EKG P AXIS: 37 DEGREES
EKG P-R INTERVAL: 128 MS
EKG P-R INTERVAL: 150 MS
EKG Q-T INTERVAL: 472 MS
EKG Q-T INTERVAL: 490 MS
EKG QRS DURATION: 104 MS
EKG QRS DURATION: 150 MS
EKG QTC CALCULATION (BAZETT): 509 MS
EKG QTC CALCULATION (BAZETT): 558 MS
EKG R AXIS: -36 DEGREES
EKG R AXIS: -48 DEGREES
EKG T AXIS: 4 DEGREES
EKG T AXIS: 8 DEGREES
EKG VENTRICULAR RATE: 70 BPM
EKG VENTRICULAR RATE: 78 BPM

## 2024-04-02 PROCEDURE — 93010 ELECTROCARDIOGRAM REPORT: CPT | Performed by: INTERNAL MEDICINE

## 2025-04-28 ENCOUNTER — HOSPITAL ENCOUNTER (EMERGENCY)
Age: 89
Discharge: HOME | End: 2025-04-28
Attending: EMERGENCY MEDICINE
Payer: MEDICARE

## 2025-04-28 ENCOUNTER — APPOINTMENT (OUTPATIENT)
Dept: GENERAL RADIOLOGY | Age: 89
End: 2025-04-28
Payer: MEDICARE

## 2025-04-28 VITALS
OXYGEN SATURATION: 93 % | SYSTOLIC BLOOD PRESSURE: 144 MMHG | HEART RATE: 79 BPM | WEIGHT: 150 LBS | HEIGHT: 63 IN | BODY MASS INDEX: 26.58 KG/M2 | RESPIRATION RATE: 15 BRPM | TEMPERATURE: 97.6 F | DIASTOLIC BLOOD PRESSURE: 75 MMHG

## 2025-04-28 DIAGNOSIS — M25.50 ARTHRALGIA, UNSPECIFIED JOINT: Primary | ICD-10-CM

## 2025-04-28 PROCEDURE — 73562 X-RAY EXAM OF KNEE 3: CPT

## 2025-04-28 PROCEDURE — 99283 EMERGENCY DEPT VISIT LOW MDM: CPT

## 2025-04-28 PROCEDURE — 6370000000 HC RX 637 (ALT 250 FOR IP): Performed by: EMERGENCY MEDICINE

## 2025-04-28 RX ORDER — HYDROCODONE BITARTRATE AND ACETAMINOPHEN 5; 325 MG/1; MG/1
1 TABLET ORAL ONCE
Status: COMPLETED | OUTPATIENT
Start: 2025-04-28 | End: 2025-04-28

## 2025-04-28 RX ADMIN — HYDROCODONE BITARTRATE AND ACETAMINOPHEN 1 TABLET: 5; 325 TABLET ORAL at 14:43

## 2025-04-28 ASSESSMENT — LIFESTYLE VARIABLES
HOW MANY STANDARD DRINKS CONTAINING ALCOHOL DO YOU HAVE ON A TYPICAL DAY: PATIENT DOES NOT DRINK
HOW OFTEN DO YOU HAVE A DRINK CONTAINING ALCOHOL: NEVER

## 2025-04-28 ASSESSMENT — PAIN - FUNCTIONAL ASSESSMENT: PAIN_FUNCTIONAL_ASSESSMENT: 0-10

## 2025-04-28 ASSESSMENT — PAIN SCALES - GENERAL
PAINLEVEL_OUTOF10: 0
PAINLEVEL_OUTOF10: 3

## 2025-04-28 NOTE — ED PROVIDER NOTES
Emergency Department Encounter    Patient: Hanna Ceballos  MRN: [id number]  : 10/11/1934  Date of Evaluation: 2025  ED Provider:  ATIYA ELLIS MD    Triage Chief Complaint:   Knee Pain (Pt brought in per [facility] from home, pt fell on Saturday landing on bilateral knees, bruising noted to left knee, swelling noted to right knee, pt reports pain to right knee is worse today and she is unable to bear weight.  Hx of dementia.  )    [place]:  Hanna Ceballos is a 89 y.o. female that presents very pleasant elderly female with underlying dementia with mechanical fall and injury bilateral knee contusions and pain.  No head trauma no neck pain.  No sensorimotor or motor deficit.  Left knee contusion greater than right.  Positive bilateral edema.  No pulse deficit.  No hip    ROS - see HPI, below listed is current ROS at time of my eval:  General:  No fevers  Musculoskeletal:  No muscle pain, + joint pain  Skin:  No rash, no pruritis, no easy bruising  Neurologic:   no extremity numbness, no extremity tingling, no extremity weakness  Extremities:  no edema, no pain    Past Medical History:   Diagnosis Date    Arthritis     CAD (coronary artery disease)     Dementia (HCC)     GERD (gastroesophageal reflux disease)     Hx of blood clots     phlebitis    Hyperlipidemia     Hypertension      Past Surgical History:   Procedure Laterality Date    APPENDECTOMY      BREAST SURGERY      cyst removal    CHOLECYSTECTOMY      ENDOSCOPY, COLON, DIAGNOSTIC      egd w/ dilitation    EYE SURGERY      cataract, bilat    JOINT REPLACEMENT      right shoulder    VARICOSE VEIN SURGERY       History reviewed. No pertinent family history.  Social History     Socioeconomic History    Marital status:      Spouse name: Not on file    Number of children: Not on file    Years of education: Not on file    Highest education level: Not on file   Occupational History    Not on file   Tobacco Use    Smoking status: Never